# Patient Record
Sex: FEMALE | Race: WHITE | NOT HISPANIC OR LATINO | Employment: FULL TIME | URBAN - METROPOLITAN AREA
[De-identification: names, ages, dates, MRNs, and addresses within clinical notes are randomized per-mention and may not be internally consistent; named-entity substitution may affect disease eponyms.]

---

## 2017-03-13 ENCOUNTER — OFFICE VISIT (OUTPATIENT)
Dept: FAMILY MEDICINE CLINIC | Facility: CLINIC | Age: 62
End: 2017-03-13

## 2017-03-13 VITALS
TEMPERATURE: 98.2 F | HEART RATE: 70 BPM | SYSTOLIC BLOOD PRESSURE: 158 MMHG | HEIGHT: 68 IN | RESPIRATION RATE: 16 BRPM | WEIGHT: 178 LBS | OXYGEN SATURATION: 98 % | DIASTOLIC BLOOD PRESSURE: 90 MMHG | BODY MASS INDEX: 26.98 KG/M2

## 2017-03-13 DIAGNOSIS — I10 ESSENTIAL HYPERTENSION: ICD-10-CM

## 2017-03-13 DIAGNOSIS — J43.9 PULMONARY EMPHYSEMA, UNSPECIFIED EMPHYSEMA TYPE (HCC): Primary | ICD-10-CM

## 2017-03-13 PROCEDURE — 99203 OFFICE O/P NEW LOW 30 MIN: CPT | Performed by: INTERNAL MEDICINE

## 2017-03-13 RX ORDER — CETIRIZINE HYDROCHLORIDE 10 MG/1
10 TABLET ORAL DAILY PRN
COMMUNITY
Start: 2016-08-07 | End: 2019-01-23

## 2017-03-13 RX ORDER — TRIAMTERENE AND HYDROCHLOROTHIAZIDE 75; 50 MG/1; MG/1
1 TABLET ORAL DAILY
Qty: 30 TABLET | Refills: 11 | Status: SHIPPED | OUTPATIENT
Start: 2017-03-13 | End: 2017-11-30

## 2017-03-13 RX ORDER — AMLODIPINE BESYLATE 2.5 MG/1
2.5 TABLET ORAL DAILY
COMMUNITY
End: 2017-03-15 | Stop reason: SDUPTHER

## 2017-03-13 RX ORDER — OMEPRAZOLE 40 MG/1
CAPSULE, DELAYED RELEASE ORAL
COMMUNITY
Start: 2017-02-23 | End: 2017-03-15 | Stop reason: SDUPTHER

## 2017-03-13 RX ORDER — METOPROLOL SUCCINATE 25 MG/1
TABLET, EXTENDED RELEASE ORAL
COMMUNITY
Start: 2017-02-23 | End: 2017-03-15 | Stop reason: SDUPTHER

## 2017-03-13 NOTE — PROGRESS NOTES
Subjective   Adele Ly is a 61 y.o. female. Patient is here today for   Chief Complaint   Patient presents with   • Hypertension     has not been seen since 2013          Vitals:    03/13/17 1418   BP: 158/90   Pulse: 70   Resp: 16   Temp: 98.2 °F (36.8 °C)   SpO2: 98%       Past Medical History   Diagnosis Date   • Arthritis    • Colon polyp    • Emphysema of lung    • Hypertension    • Infectious viral hepatitis       Allergies   Allergen Reactions   • Penicillins       Social History     Social History   • Marital status:      Spouse name: N/A   • Number of children: N/A   • Years of education: N/A     Occupational History   • Not on file.     Social History Main Topics   • Smoking status: Current Every Day Smoker   • Smokeless tobacco: Not on file   • Alcohol use Yes   • Drug use: Not on file   • Sexual activity: Not on file     Other Topics Concern   • Not on file     Social History Narrative   • No narrative on file        Current Outpatient Prescriptions:   •  acyclovir (ZOVIRAX) 400 MG tablet, Take 1 tablet by mouth Daily., Disp: 30 tablet, Rfl: 4  •  amLODIPine (NORVASC) 2.5 MG tablet, Take 2.5 mg by mouth Daily., Disp: , Rfl:   •  Apremilast (OTEZLA PO), Take  by mouth., Disp: , Rfl:   •  betamethasone valerate (VALISONE) 0.1 % cream, Apply  topically 3 (Three) Times a Day., Disp: , Rfl:   •  cetirizine (ZYRTEC ALLERGY) 10 MG tablet, Take 10 mg by mouth., Disp: , Rfl:   •  estradiol (ESTRACE) 0.5 MG tablet, Take 1 tablet by mouth Daily., Disp: 30 tablet, Rfl: 4  •  medroxyPROGESTERone (PROVERA) 2.5 MG tablet, Take 1 tablet by mouth Daily., Disp: 30 tablet, Rfl: 4  •  metoprolol succinate XL (TOPROL-XL) 25 MG 24 hr tablet, , Disp: , Rfl:   •  omeprazole (priLOSEC) 40 MG capsule, , Disp: , Rfl:   •  amLODIPine (NORVASC) 10 MG tablet, Take  by mouth Daily., Disp: , Rfl:   •  triamterene-hydrochlorothiazide (MAXZIDE) 75-50 MG per tablet, Take 1 tablet by mouth Daily., Disp: 30 tablet, Rfl: 11      Objective     HPI Comments: This patient is being followed by her dermatologist for psoriasis.    She is to be a patient of mine, she is returned back to my office today because her insurance allows it.  She and I had known each other bit more than 3 years ago and we didn't accept her insurance any longer, or she had to do without insurance.  In any case, she has insurance which we except that I'm glad to see her back.    She has been treated for hypertension with amlodipine, and triamterene or thiazide.    She sees a gynecologist with whom she follows up for Provera, Estrace etc.    Her dermatologist prescribes Otezla     Hypertension          Review of Systems   Constitutional: Negative.    HENT: Negative.    Respiratory: Negative.    Cardiovascular: Negative.    Genitourinary: Negative.    Musculoskeletal: Negative.    Psychiatric/Behavioral: Negative.        Physical Exam   Constitutional: She is oriented to person, place, and time. She appears well-developed and well-nourished.   HENT:   Head: Normocephalic and atraumatic.   Cardiovascular: Normal rate.    Pulmonary/Chest: Effort normal.   Neurological: She is alert and oriented to person, place, and time.   Psychiatric: She has a normal mood and affect. Her behavior is normal. Thought content normal.   Nursing note and vitals reviewed.        Problem List Items Addressed This Visit        Cardiovascular and Mediastinum    BP (high blood pressure)    Relevant Medications    metoprolol succinate XL (TOPROL-XL) 25 MG 24 hr tablet    amLODIPine (NORVASC) 2.5 MG tablet    triamterene-hydrochlorothiazide (MAXZIDE) 75-50 MG per tablet       Respiratory    Pulmonary emphysema - Primary    Relevant Medications    cetirizine (ZYRTEC ALLERGY) 10 MG tablet            PLAN  Her hypertension is not so well-controlled today.  She has a blood pressure cuff at home.  I asked her to check her blood pressure cuff regularly (check her blood pressure regularly).    I asked her  to follow-up in a month or 2 to bring her blood pressure cuff with her so I can take a look at her blood pressure.    She is emphysema.  She requested a refill of Advair Diskus.  I gave her sample of this today.    Follow-up with me in approximately 6 months for a comprehensive physical exam.  No Follow-up on file.

## 2017-03-15 ENCOUNTER — TELEPHONE (OUTPATIENT)
Dept: FAMILY MEDICINE CLINIC | Facility: CLINIC | Age: 62
End: 2017-03-15

## 2017-03-15 RX ORDER — METOPROLOL SUCCINATE 25 MG/1
25 TABLET, EXTENDED RELEASE ORAL DAILY
Qty: 90 TABLET | Refills: 1 | Status: SHIPPED | OUTPATIENT
Start: 2017-03-15 | End: 2017-09-23 | Stop reason: SDUPTHER

## 2017-03-15 RX ORDER — AMLODIPINE BESYLATE 2.5 MG/1
2.5 TABLET ORAL DAILY
Qty: 90 TABLET | Refills: 1 | Status: SHIPPED | OUTPATIENT
Start: 2017-03-15 | End: 2017-11-30 | Stop reason: DRUGHIGH

## 2017-03-15 RX ORDER — OMEPRAZOLE 40 MG/1
40 CAPSULE, DELAYED RELEASE ORAL DAILY
Qty: 90 CAPSULE | Refills: 1 | Status: SHIPPED | OUTPATIENT
Start: 2017-03-15 | End: 2017-10-27 | Stop reason: SDUPTHER

## 2017-03-15 NOTE — TELEPHONE ENCOUNTER
Sent to pharmacy   ----- Message from Marcia Nuno sent at 3/15/2017  9:13 AM EDT -----  PT SAYS THE SCRIPT THAT WAS CALLED THE AMITRIPTYLINE SHE HASN'T TAKEN IN OVER 5 YEARS.  SHE NEEDS AMLODIPINE, METOPROLOL, OMEPRAZOLE.    PHARMACY: [phone]    PLEASE CALL PT WHEN DONE OR WITH QUESTIONS THANK YOU

## 2017-04-10 ENCOUNTER — TELEPHONE (OUTPATIENT)
Dept: FAMILY MEDICINE CLINIC | Facility: CLINIC | Age: 62
End: 2017-04-10

## 2017-04-10 RX ORDER — AMLODIPINE BESYLATE 10 MG/1
10 TABLET ORAL DAILY
Qty: 90 TABLET | Refills: 1 | Status: SHIPPED | OUTPATIENT
Start: 2017-04-10 | End: 2017-10-05 | Stop reason: SDUPTHER

## 2017-04-10 RX ORDER — ESTRADIOL 0.5 MG/1
TABLET ORAL
Qty: 30 TABLET | Refills: 3 | Status: SHIPPED | OUTPATIENT
Start: 2017-04-10 | End: 2017-08-09 | Stop reason: SDUPTHER

## 2017-04-10 RX ORDER — MEDROXYPROGESTERONE ACETATE 2.5 MG/1
TABLET ORAL
Qty: 30 TABLET | Refills: 3 | Status: SHIPPED | OUTPATIENT
Start: 2017-04-10 | End: 2017-08-09 | Stop reason: SDUPTHER

## 2017-04-10 NOTE — TELEPHONE ENCOUNTER
Prescription sent to pharmacy   ----- Message from Jaimee Bird MA sent at 4/10/2017  9:42 AM EDT -----  Contact: PATIENT  PT SAID THAT RECENTLY HER AMLODIPINE WAS CALLED INTO [facility] BUT IT WAS SENT IN FOR AMLODIPINE 2.5MG AND PT STATES SHE IS TAKING 10MG. PT WANTS TO KNOW IF YOU CAN SEND AN RX FOR CORRECT DOSAGE. PLEASE CALL PT WITH ANY QUESTIONS. [phone]. THANK YOU.

## 2017-07-24 RX ORDER — ACYCLOVIR 400 MG/1
TABLET ORAL
Qty: 30 TABLET | Refills: 3 | OUTPATIENT
Start: 2017-07-24

## 2017-08-09 ENCOUNTER — TELEPHONE (OUTPATIENT)
Dept: OBSTETRICS AND GYNECOLOGY | Age: 62
End: 2017-08-09

## 2017-08-09 RX ORDER — ESTRADIOL 0.5 MG/1
0.5 TABLET ORAL DAILY
Qty: 30 TABLET | Refills: 2 | Status: SHIPPED | OUTPATIENT
Start: 2017-08-09 | End: 2017-11-27

## 2017-08-09 RX ORDER — MEDROXYPROGESTERONE ACETATE 2.5 MG/1
2.5 TABLET ORAL DAILY
Qty: 30 TABLET | Refills: 2 | Status: SHIPPED | OUTPATIENT
Start: 2017-08-09 | End: 2017-11-27

## 2017-08-09 RX ORDER — ESTRADIOL 0.5 MG/1
TABLET ORAL
Qty: 30 TABLET | Refills: 2 | Status: SHIPPED | OUTPATIENT
Start: 2017-08-09 | End: 2017-11-03 | Stop reason: SDUPTHER

## 2017-08-09 RX ORDER — MEDROXYPROGESTERONE ACETATE 2.5 MG/1
TABLET ORAL
Qty: 30 TABLET | Refills: 2 | Status: SHIPPED | OUTPATIENT
Start: 2017-08-09 | End: 2017-11-03 | Stop reason: SDUPTHER

## 2017-08-09 RX ORDER — ACYCLOVIR 400 MG/1
400 TABLET ORAL DAILY
Qty: 30 TABLET | Refills: 2 | Status: SHIPPED | OUTPATIENT
Start: 2017-08-09 | End: 2017-10-16 | Stop reason: SDUPTHER

## 2017-08-09 NOTE — TELEPHONE ENCOUNTER
Dr REYNOLDS pt, has AE sched for 10/18/17. Needing refills sent to pharm on file for:  Provera 2.5 mg PO daily  Estradiol 0.5 mg PO daily  Acyclovir 400 mg PO daily    Pt # [phone]

## 2017-09-25 RX ORDER — METOPROLOL SUCCINATE 25 MG/1
TABLET, EXTENDED RELEASE ORAL
Qty: 30 TABLET | Refills: 0 | Status: SHIPPED | OUTPATIENT
Start: 2017-09-25 | End: 2017-10-23 | Stop reason: SDUPTHER

## 2017-10-06 RX ORDER — AMLODIPINE BESYLATE 10 MG/1
TABLET ORAL
Qty: 30 TABLET | Refills: 0 | Status: SHIPPED | OUTPATIENT
Start: 2017-10-06 | End: 2017-11-10 | Stop reason: SDUPTHER

## 2017-10-16 ENCOUNTER — OFFICE VISIT (OUTPATIENT)
Dept: OBSTETRICS AND GYNECOLOGY | Age: 62
End: 2017-10-16

## 2017-10-16 VITALS
WEIGHT: 170 LBS | SYSTOLIC BLOOD PRESSURE: 180 MMHG | BODY MASS INDEX: 26.68 KG/M2 | HEIGHT: 67 IN | DIASTOLIC BLOOD PRESSURE: 98 MMHG

## 2017-10-16 DIAGNOSIS — Z01.419 ENCOUNTER FOR GYNECOLOGICAL EXAMINATION WITHOUT ABNORMAL FINDING: Primary | ICD-10-CM

## 2017-10-16 DIAGNOSIS — Z12.4 SCREENING FOR CERVICAL CANCER: ICD-10-CM

## 2017-10-16 PROCEDURE — 99396 PREV VISIT EST AGE 40-64: CPT | Performed by: OBSTETRICS & GYNECOLOGY

## 2017-10-16 RX ORDER — ACYCLOVIR 400 MG/1
400 TABLET ORAL DAILY
Qty: 30 TABLET | Refills: 4 | Status: SHIPPED | OUTPATIENT
Start: 2017-10-16 | End: 2018-05-26 | Stop reason: SDUPTHER

## 2017-10-16 NOTE — PROGRESS NOTES
Routine Annual Visit    10/16/2017    Patient: Adele Ly          MR#:[id number]      Chief Complaint   Patient presents with   • Annual Exam     PT HERE FOR ROUTINE AE, NOT SURE ON HER LAST MG THINKS IT WAS 2016 BUT NEVER WENT BACK FOR 3D IMAGING DUE TO COPAY COST. SHE IS OTHERWISE OK. LAST PAP 2012 (ASCUS BUT NEG HPV).       History of Present Illness    61 y.o. female No obstetric history on file. who presents for annual exam.   Pt never followed up on mammo from 1/2016- no one could tell her the copay  She has no breast or GYN concerns  She is taking HRT and wants a refill - she gets HF , NS and mood swings off them  She is a 1 ppd smoker  I discussed risk of stroke and increased risk of breast cancer with HRT- I rec trial off and encouraged her to try it this winter  Also discussed antidepressants as an option of menopausal symptoms  Had CSC last year and polyps found and they wanted her back in 1 year  She says she doesn't plan to repeat CSC anytime soon because she has   Too many other concerns including a cyst on her eye and no one can tell her her copay for this as well  Due for pap          No LMP recorded. Patient is postmenopausal.  Obstetric History:  OB History     No data available         Menstrual History:     No LMP recorded. Patient is postmenopausal.       Sexual History:       ________________________________________  Patient Active Problem List   Diagnosis   • BP (high blood pressure)   • Pulmonary emphysema       Past Medical History:   Diagnosis Date   • Arthritis    • Colon polyp    • Emphysema of lung    • Hypertension    • Infectious viral hepatitis        No past surgical history on file.    History   Smoking Status   • Current Every Day Smoker   Smokeless Tobacco   • Not on file       has a current medication list which includes the following prescription(s): acyclovir, amlodipine, amlodipine, apremilast, betamethasone valerate, cetirizine, estradiol, estradiol, medroxyprogesterone,  "medroxyprogesterone, metoprolol succinate xl, omeprazole, and triamterene-hydrochlorothiazide.  ________________________________________    Current contraception: post menopausal status  History of abnormal Pap smear: no  Family history of Breast cancer: no  Family history of uterine or ovarian cancer: no  Family History of colon cancer/colon polyps: yes - pt with polyps  History of abnormal mammogram: yes - got call back last mammogram and declined to follow up      The following portions of the patient's history were reviewed and updated as appropriate: allergies, current medications, past family history, past medical history, past social history, past surgical history and problem list.    Review of Systems    Pertinent items are noted in HPI.     Objective   Physical Exam    /98  Ht 67\" (170.2 cm)  Wt 170 lb (77.1 kg)  BMI 26.63 kg/m2   BP Readings from Last 3 Encounters:   10/16/17 180/98   03/13/17 158/90   09/26/13 122/74      Wt Readings from Last 3 Encounters:   10/16/17 170 lb (77.1 kg)   03/13/17 178 lb (80.7 kg)   09/26/13 167 lb (75.8 kg)      BMI: Estimated body mass index is 26.63 kg/(m^2) as calculated from the following:    Height as of this encounter: 67\" (170.2 cm).    Weight as of this encounter: 170 lb (77.1 kg).      General:   alert, appears stated age and cooperative   Abdomen: soft, non-tender, without masses or organomegaly   Breast: inspection negative, no nipple discharge or bleeding, no masses or nodularity palpable   Vulva: normal   Vagina: normal mucosa   Cervix: no cervical motion tenderness and no lesions   Uterus: normal size, mobile or non-tender   Adnexa: normal adnexa and no mass, fullness, tenderness     Assessment:    1. Normal annual exam   Assessment     ICD-10-CM ICD-9-CM   1. Encounter for gynecological examination without abnormal finding Z01.419 V72.31   2. Screening for cervical cancer Z12.4 V76.2     Plan:    Plan     [x]  Mammogram request made  [x]  PAP " done  []  Labs:   []  GC/Chl/TV  []  DEXA scan   []  Referral for colonoscopy:       Adele was seen today for annual exam.    Diagnoses and all orders for this visit:    Encounter for gynecological examination without abnormal finding  -     IGP, Aptima HPV, Rfx 16 / 18,45 - ThinPrep Vial, Cervix    Screening for cervical cancer  -     IGP, Aptima HPV, Rfx 16 / 18,45 - ThinPrep Vial, Cervix      Will do screening mammo today for stability from last mammo  rec off HRT for breast cancer and stroke risk concerns as she is smoker  Encouraged fu CSC  Encouraged smoking cessation    Counseling:  --Nutrition: Stressed importance of moderation and caloric balance, stressed fresh fruit and vegetables  --Exercise: Stressed the importance of regular exercise. 3-5 times weekly   - Discussed screening mammogram recommendations.   --Discussed benefits of screening colonoscopy- age 50 unless FH  --Discussed pap smear screening recommendations

## 2017-10-18 LAB
CYTOLOGIST CVX/VAG CYTO: NORMAL
CYTOLOGY CVX/VAG DOC THIN PREP: NORMAL
DX ICD CODE: NORMAL
HIV 1 & 2 AB SER-IMP: NORMAL
HPV I/H RISK 4 DNA CVX QL PROBE+SIG AMP: NEGATIVE
OTHER STN SPEC: NORMAL
PATH REPORT.FINAL DX SPEC: NORMAL
STAT OF ADQ CVX/VAG CYTO-IMP: NORMAL

## 2017-10-19 ENCOUNTER — TELEPHONE (OUTPATIENT)
Dept: OBSTETRICS AND GYNECOLOGY | Age: 62
End: 2017-10-19

## 2017-10-23 RX ORDER — METOPROLOL SUCCINATE 25 MG/1
TABLET, EXTENDED RELEASE ORAL
Qty: 30 TABLET | Refills: 0 | Status: SHIPPED | OUTPATIENT
Start: 2017-10-23 | End: 2017-11-26 | Stop reason: SDUPTHER

## 2017-10-27 RX ORDER — OMEPRAZOLE 40 MG/1
CAPSULE, DELAYED RELEASE ORAL
Qty: 30 CAPSULE | Refills: 0 | Status: SHIPPED | OUTPATIENT
Start: 2017-10-27 | End: 2017-11-26 | Stop reason: SDUPTHER

## 2017-10-31 ENCOUNTER — TELEPHONE (OUTPATIENT)
Dept: OBSTETRICS AND GYNECOLOGY | Age: 62
End: 2017-10-31

## 2017-10-31 ENCOUNTER — APPOINTMENT (OUTPATIENT)
Dept: MAMMOGRAPHY | Facility: HOSPITAL | Age: 62
End: 2017-10-31
Attending: OBSTETRICS & GYNECOLOGY

## 2017-10-31 ENCOUNTER — HOSPITAL ENCOUNTER (OUTPATIENT)
Dept: ULTRASOUND IMAGING | Facility: HOSPITAL | Age: 62
End: 2017-10-31
Attending: OBSTETRICS & GYNECOLOGY

## 2017-11-02 ENCOUNTER — APPOINTMENT (OUTPATIENT)
Dept: ULTRASOUND IMAGING | Facility: HOSPITAL | Age: 62
End: 2017-11-02
Attending: OBSTETRICS & GYNECOLOGY

## 2017-11-09 ENCOUNTER — APPOINTMENT (OUTPATIENT)
Dept: ULTRASOUND IMAGING | Facility: HOSPITAL | Age: 62
End: 2017-11-09
Attending: OBSTETRICS & GYNECOLOGY

## 2017-11-09 ENCOUNTER — HOSPITAL ENCOUNTER (OUTPATIENT)
Dept: MAMMOGRAPHY | Facility: HOSPITAL | Age: 62
End: 2017-11-09
Attending: OBSTETRICS & GYNECOLOGY

## 2017-11-10 RX ORDER — AMLODIPINE BESYLATE 10 MG/1
TABLET ORAL
Qty: 30 TABLET | Refills: 0 | Status: SHIPPED | OUTPATIENT
Start: 2017-11-10 | End: 2017-12-07 | Stop reason: SDUPTHER

## 2017-11-17 ENCOUNTER — HOSPITAL ENCOUNTER (OUTPATIENT)
Dept: ULTRASOUND IMAGING | Facility: HOSPITAL | Age: 62
End: 2017-11-17
Attending: OBSTETRICS & GYNECOLOGY

## 2017-11-17 ENCOUNTER — HOSPITAL ENCOUNTER (OUTPATIENT)
Dept: MAMMOGRAPHY | Facility: HOSPITAL | Age: 62
Discharge: HOME | End: 2017-11-17
Attending: OBSTETRICS & GYNECOLOGY | Admitting: OBSTETRICS & GYNECOLOGY

## 2017-11-17 ENCOUNTER — HOSPITAL ENCOUNTER (OUTPATIENT)
Dept: MAMMOGRAPHY | Facility: HOSPITAL | Age: 62
Discharge: HOME | End: 2017-11-17
Attending: OBSTETRICS & GYNECOLOGY

## 2017-11-17 VITALS
BODY MASS INDEX: 25.01 KG/M2 | RESPIRATION RATE: 20 BRPM | HEIGHT: 68 IN | TEMPERATURE: 96.7 F | WEIGHT: 165 LBS | HEART RATE: 80 BPM | DIASTOLIC BLOOD PRESSURE: 108 MMHG | OXYGEN SATURATION: 99 % | SYSTOLIC BLOOD PRESSURE: 175 MMHG

## 2017-11-17 DIAGNOSIS — N64.89 BREAST ASYMMETRY: ICD-10-CM

## 2017-11-17 DIAGNOSIS — R92.1 BREAST CALCIFICATION, RIGHT: ICD-10-CM

## 2017-11-17 DIAGNOSIS — R92.1 BREAST CALCIFICATION SEEN ON MAMMOGRAM: ICD-10-CM

## 2017-11-17 PROCEDURE — 88305 TISSUE EXAM BY PATHOLOGIST: CPT | Performed by: OBSTETRICS & GYNECOLOGY

## 2017-11-17 PROCEDURE — G0206 DX MAMMO INCL CAD UNI: HCPCS

## 2017-11-17 RX ORDER — LIDOCAINE HYDROCHLORIDE 10 MG/ML
1 INJECTION, SOLUTION INFILTRATION; PERINEURAL ONCE
Status: COMPLETED | OUTPATIENT
Start: 2017-11-17 | End: 2017-11-17

## 2017-11-17 RX ORDER — LIDOCAINE HYDROCHLORIDE AND EPINEPHRINE 10; 10 MG/ML; UG/ML
20 INJECTION, SOLUTION INFILTRATION; PERINEURAL ONCE
Status: COMPLETED | OUTPATIENT
Start: 2017-11-17 | End: 2017-11-17

## 2017-11-17 RX ADMIN — LIDOCAINE HYDROCHLORIDE 1 ML: 10 INJECTION, SOLUTION INFILTRATION; PERINEURAL at 13:05

## 2017-11-17 RX ADMIN — LIDOCAINE HYDROCHLORIDE AND EPINEPHRINE 20 ML: 10; 10 INJECTION, SOLUTION INFILTRATION; PERINEURAL at 13:05

## 2017-11-17 RX ADMIN — LIDOCAINE HYDROCHLORIDE AND EPINEPHRINE 20 ML: 10; 10 INJECTION, SOLUTION INFILTRATION; PERINEURAL at 13:40

## 2017-11-17 RX ADMIN — LIDOCAINE HYDROCHLORIDE 1 ML: 10 INJECTION, SOLUTION INFILTRATION; PERINEURAL at 13:40

## 2017-11-17 NOTE — NURSING NOTE
Biopsy sites x2 to right lateral breast. Dermabond to each site dry and intact. Lime sized area of light bruising present around sites. No firmness or swelling noted at or around either biopsy site. Denies pain. Ice pack with protective covering applied to biopsy sites. Wide Ace pressure wrap applied in lieu of a bra as her bra is underwire and the wires sit very close of biopsy sites. Stressed the importance of wearing the wrap or a soft bra 24/7 for 4 days. Also stressed the importance of applying cold packs as directed. Discharge instructions discussed with understanding voiced by patient. Copies provided to patient. No distress noted. To home via private vehicle.

## 2017-11-18 ENCOUNTER — TELEPHONE (OUTPATIENT)
Dept: INTERVENTIONAL RADIOLOGY/VASCULAR | Facility: HOSPITAL | Age: 62
End: 2017-11-18

## 2017-11-20 ENCOUNTER — TELEPHONE (OUTPATIENT)
Dept: OBSTETRICS AND GYNECOLOGY | Age: 62
End: 2017-11-20

## 2017-11-20 DIAGNOSIS — C50.911 DUCTAL CARCINOMA OF RIGHT BREAST (HCC): Primary | ICD-10-CM

## 2017-11-24 LAB
CYTO UR: NORMAL
LAB AP CASE REPORT: NORMAL
LAB AP CLINICAL INFORMATION: NORMAL
Lab: NORMAL
PATH REPORT.ADDENDUM SPEC: NORMAL
PATH REPORT.FINAL DX SPEC: NORMAL
PATH REPORT.GROSS SPEC: NORMAL

## 2017-11-27 ENCOUNTER — TELEPHONE (OUTPATIENT)
Dept: OBSTETRICS AND GYNECOLOGY | Age: 62
End: 2017-11-27

## 2017-11-27 RX ORDER — METOPROLOL SUCCINATE 25 MG/1
TABLET, EXTENDED RELEASE ORAL
Qty: 30 TABLET | Refills: 0 | Status: SHIPPED | OUTPATIENT
Start: 2017-11-27 | End: 2017-12-26 | Stop reason: SDUPTHER

## 2017-11-27 RX ORDER — OMEPRAZOLE 40 MG/1
CAPSULE, DELAYED RELEASE ORAL
Qty: 30 CAPSULE | Refills: 0 | Status: SHIPPED | OUTPATIENT
Start: 2017-11-27 | End: 2017-12-26 | Stop reason: SDUPTHER

## 2017-11-27 NOTE — TELEPHONE ENCOUNTER
Left message about hormone receptors in path report and to confirm she needs to stop all HRT immediately

## 2017-11-27 NOTE — TELEPHONE ENCOUNTER
Spoke with pt  She confirmed off HRT these past 10 days  Aware of appt Thursday and plans to be there

## 2017-11-30 ENCOUNTER — OFFICE VISIT (OUTPATIENT)
Dept: MAMMOGRAPHY | Facility: CLINIC | Age: 62
End: 2017-11-30

## 2017-11-30 ENCOUNTER — PREP FOR SURGERY (OUTPATIENT)
Dept: OTHER | Facility: HOSPITAL | Age: 62
End: 2017-11-30

## 2017-11-30 VITALS
WEIGHT: 170 LBS | HEIGHT: 68 IN | HEART RATE: 81 BPM | TEMPERATURE: 98.2 F | DIASTOLIC BLOOD PRESSURE: 95 MMHG | SYSTOLIC BLOOD PRESSURE: 155 MMHG | OXYGEN SATURATION: 99 % | BODY MASS INDEX: 25.76 KG/M2

## 2017-11-30 DIAGNOSIS — Z17.0 MALIGNANT NEOPLASM OF OVERLAPPING SITES OF RIGHT BREAST IN FEMALE, ESTROGEN RECEPTOR POSITIVE (HCC): Primary | ICD-10-CM

## 2017-11-30 DIAGNOSIS — C50.811 MALIGNANT NEOPLASM OF OVERLAPPING SITES OF RIGHT BREAST IN FEMALE, ESTROGEN RECEPTOR POSITIVE (HCC): Primary | ICD-10-CM

## 2017-11-30 PROCEDURE — 99205 OFFICE O/P NEW HI 60 MIN: CPT | Performed by: SURGERY

## 2017-11-30 RX ORDER — ACETAMINOPHEN 500 MG
1000 TABLET ORAL ONCE
Status: CANCELLED | OUTPATIENT
Start: 2018-01-10 | End: 2017-11-30

## 2017-11-30 RX ORDER — CELECOXIB 200 MG/1
400 CAPSULE ORAL ONCE
Status: CANCELLED | OUTPATIENT
Start: 2018-01-10 | End: 2017-11-30

## 2017-11-30 RX ORDER — CLINDAMYCIN PHOSPHATE 900 MG/50ML
900 INJECTION INTRAVENOUS ONCE
Status: CANCELLED | OUTPATIENT
Start: 2018-01-10 | End: 2017-11-30

## 2017-11-30 RX ORDER — IBUPROFEN 200 MG
200 TABLET ORAL EVERY 6 HOURS PRN
COMMUNITY
End: 2020-07-12

## 2017-11-30 RX ORDER — LIDOCAINE AND PRILOCAINE 25; 25 MG/G; MG/G
CREAM TOPICAL ONCE
Status: CANCELLED | OUTPATIENT
Start: 2018-01-10 | End: 2017-11-30

## 2017-11-30 NOTE — PROGRESS NOTES
Chief Complaint: Adele Ly is a 62 y.o.. female here today for Breast Cancer (right breast)        History of Present Illness:  Patient presents with newly diagnosed breast cancer.  The patient apparently had mammograms last year which described a large area of calcifications in the upper outer quadrant of the right breast measuring 12 x 6 cm.  There was also a separate area of calcifications about 4 cm away.  The patient did not pursue evaluation of this and returned for imaging studies again this year.  In the posterior one third of the upper outer quadrant of the right breast, there was an area of focal asymmetry in association with the calcifications.  There were also a number of calcifications in the posterior one third of the right breast at 9:00.  The patient underwent diagnostic mammography and was noted to have 2 separate clusters of suspicious calcifications.  These appeared to be  by about 50 mm.  A biopsy was obtained at the 10:30 position as well as the 9 o'clock position.  At the 10:30 position, invasive ductal cancer was discovered.  This tumor was grade 2 and was associated with some high-grade DCIS.  This invasive cancer was ER positive at greater than 90%, OH positive at 90%, and HER-2 positive at 3+.  She also had biopsy of an area at the 9 o'clock position which revealed high-grade DCIS without definite invasion.  Prior to these studies, the patient had not detected any palpable masses and there was no evidence of nipple discharge.  Her family history is significant for a maternal grandmother with breast cancer as well as a maternal aunt with breast cancer.  There was also a paternal aunt with ovarian cancer and a paternal uncle with colon cancer.  The patient has been taking hormone replacement therapy up until about 2 weeks ago.  She is a smoker and does not have any children.  I have personally reviewed her imaging studies and agree with the findings.      Review of  Systems:  Review of Systems   Skin:        Right breast with bruising and tenderness, but no other changes to the skin of the breast   All other systems reviewed and are negative.       Past Medical and Surgical History:  Breast Biopsy History:  Patient has had the following breast biopsies:right breast 2017 malignant  Breast Cancer HIstory:  Patient does not have a past medical history of breast cancer.  Breast Operations, and year:  None    History   Smoking Status   • Current Every Day Smoker   • Packs/day: 1.00   • Types: Cigarettes   • Start date: 1975   Smokeless Tobacco   • Not on file     Obstetric History:  Patient is postmenopausal, entered menopause naturally at age: 52   Number of pregnancies:0  Number of live births: 0  Number of abortions or miscarriages: 0  Length of time taking birth control pills:6 years  Patient took hormone replacement during the following dates:Estradiol for 10 years.     Past Surgical History:   Procedure Laterality Date   • BREAST BIOPSY Right 2017    malignant   • COLON RESECTION     • COLOSTOMY     • COLOSTOMY REVISION     • PELVIC LAPAROSCOPY     • TONSILLECTOMY     • WISDOM TOOTH EXTRACTION         Past Medical History:   Diagnosis Date   • Arthritis    • Colon polyp    • Emphysema of lung    • Hypertension    • Infectious viral hepatitis        Prior Hospitalizations, other than for surgery or childbirth, and year:  none    Social History:  Patient is single.  Patient has no children.    Family History:  Family History   Problem Relation Age of Onset   • Diabetes Father    • Hypertension Father    • No Known Problems Mother    • No Known Problems Sister    • No Known Problems Brother    • No Known Problems Daughter    • No Known Problems Son    • Breast cancer Maternal Grandmother 60   • No Known Problems Paternal Grandmother    • Breast cancer Maternal Aunt 60   • Diabetes Paternal Aunt    • Ovarian cancer Paternal Aunt 41   • Colon cancer Maternal Uncle    • COPD  Paternal Uncle    • BRCA 1/2 Neg Hx    • Endometrial cancer Neg Hx        Vital Signs:  Vitals:    11/30/17 1337   BP: 155/95   Pulse: 81   Temp: 98.2 °F (36.8 °C)   SpO2: 99%       Medications:    Current Outpatient Prescriptions:     Current Outpatient Prescriptions:   •  acyclovir (ZOVIRAX) 400 MG tablet, Take 1 tablet by mouth Daily., Disp: 30 tablet, Rfl: 4  •  amLODIPine (NORVASC) 10 MG tablet, TAKE ONE TABLET BY MOUTH DAILY, Disp: 30 tablet, Rfl: 0  •  Apremilast (OTEZLA PO), Take  by mouth., Disp: , Rfl:   •  cetirizine (ZYRTEC ALLERGY) 10 MG tablet, Take 10 mg by mouth., Disp: , Rfl:   •  ibuprofen (ADVIL,MOTRIN) 200 MG tablet, Take 200 mg by mouth Every 6 (Six) Hours As Needed., Disp: , Rfl:   •  metoprolol succinate XL (TOPROL-XL) 25 MG 24 hr tablet, TAKE ONE TABLET BY MOUTH DAILY, Disp: 30 tablet, Rfl: 0  •  omeprazole (priLOSEC) 40 MG capsule, TAKE ONE CAPSULE BY MOUTH DAILY, Disp: 30 capsule, Rfl: 0  •  TURMERIC PO, Take  by mouth., Disp: , Rfl:     Physical Examination:  General Appearance:   Patient is in no distress.  She is well kept and has an average build.   Psychiatric:  Patient with appropriate mood and affect. Alert and oriented to self, time, and place.    Breast, RIGHT:  medium sized, symmetric with the contralateral side.  Breast skin is without erythema, edema, rashes.  There are no visible abnormalities upon inspection during the arm-raising maneuver or with hands on hips in the sitting position. There is no nipple retraction, discharge or nipple/areolar skin changes.There are too small biopsy scars in the lateral aspect of the breast from her recent biopsies.  There is significant bruising around the more inferior one as well as tenderness.  There is some firmness associated with his bruising but I think it is all related to a hematoma.  The breast tissue does feel fairly dense.    Breast, LEFT:  medium sized, symmetric with the contralateral side.  Breast skin is without erythema,  edema, rashes.  There are no visible abnormalities upon inspection during the arm-raising maneuver or with hands on hips in the sitting position. There is no nipple retraction, discharge or nipple/areolar skin changes.There are no masses palpable in the sitting or supine positions.  The patient does have fairly dense breast tissue.    Lymphatic:  There is no axillary, cervical, infraclavicular, or supraclavicular adenopathy bilaterally.  Eyes:  Pupils are round and reactive to light.  Cardiovascular:  Heart rate and rhythm are regular.  Respiratory:  Lungs are clear bilaterally with no crackles or wheezes in any lung field.  Gastrointestinal:  Abdomen is soft, nondistended, and nontender.  There was no evidence of hepatosplenomegaly or abdominal mass.  There are  scars from previous colon surgery.    Musculoskeletal:  Good strength in all 4 extremities.   There is good range of motion in both shoulders.    Skin:  No new skin lesions or rashes on the skin excluding the breast (see breast exam above).    Assessment:  1. Malignant neoplasm of overlapping sites of right breast in female, estrogen receptor positive          Plan:  She was accompanied today by her sister.  The office visit lasted an hour and 10 minutes with 55 minutes spent in face-to-face consultation.    We began the conversation discussing her pathology report.  We talked about the origin of most of breast cancers from either the ducts or the lobules.  The difference between invasive and in situ disease was explained and the visual was drawn for her.  When invasion has occurred, the lymph nodes need to be evaluated.  When there is in situ disease the lymph nodes should be considered if the area of concern is widespread or it is high-grade.  We also discussed the significance of hormone receptors.  They often can give us some idea how the breast cancer will behave and potentially lead us to offer neoadjuvant chemotherapy.    Next we discussed the  surgical options which include breast conserving therapy versus a mastectomy.  With breast conserving therapy we are talking about a lumpectomy with margins, lymph node evaluation, and radiation treatment.  The radiation treatment is generally given to the entire breast for 6 weeks.  The side effects consist of local skin change and potential injury to nearby structures such as the lung or the heart.  A mastectomy would involve removing the breast tissues with preservation of the pectoral muscles and evaluation of the lymph nodes if indicated.  The potential for reconstruction by either an implant or autologous tissue was discussed.  This could be performed on a delayed basis or immediately depending on a multitude of factors.  The survival rates for these 2 procedures are equivalent but there are incidences where one may be favored over the other.  There are times when a lumpectomy is not possible due to the large tumor to breast ratio or the location of the tumor.  Previous radiation to the chest wall or collagen disorders such as scleroderma would make radiation treatment and possible and therefore exclude breast conserving therapy as an option.    As we began talking about her options, I explained to her the difficulty in performing a lumpectomy when having 2 separate quadrants with cancer.  I think her best option is a mastectomy.  Unfortunately her reconstructive options are not good because she is currently smoking.  I still think it would be a good idea for her to speak with the plastic surgeons and get an idea what is available to her regarding reconstruction.  I do not think that we would gain much with an MRI of the breasts.  CPT coding:    Next Appointment:  No Follow-up on file.            EMR Dragon/transcription disclaimer:    Much of this encounter note is an electronic transcription/translocation of spoken language to printed text.  The electronic translation of spoken language may permit erroneous,  or at times, nonsensical words or phrases to be inadvertently transcribed.  Although I have reviewed the note from such areas, some may still exist.

## 2017-12-01 DIAGNOSIS — C50.811 MALIGNANT NEOPLASM OF OVERLAPPING SITES OF RIGHT BREAST IN FEMALE, ESTROGEN RECEPTOR POSITIVE (HCC): Primary | ICD-10-CM

## 2017-12-01 DIAGNOSIS — Z17.0 MALIGNANT NEOPLASM OF OVERLAPPING SITES OF RIGHT BREAST IN FEMALE, ESTROGEN RECEPTOR POSITIVE (HCC): Primary | ICD-10-CM

## 2017-12-07 RX ORDER — AMLODIPINE BESYLATE 10 MG/1
TABLET ORAL
Qty: 30 TABLET | Refills: 0 | Status: SHIPPED | OUTPATIENT
Start: 2017-12-07 | End: 2018-01-04

## 2017-12-18 ENCOUNTER — TELEPHONE (OUTPATIENT)
Dept: MAMMOGRAPHY | Facility: CLINIC | Age: 62
End: 2017-12-18

## 2017-12-18 PROBLEM — Z17.0 MALIGNANT NEOPLASM OF OVERLAPPING SITES OF RIGHT BREAST IN FEMALE, ESTROGEN RECEPTOR POSITIVE (HCC): Status: ACTIVE | Noted: 2017-12-18

## 2017-12-18 PROBLEM — C50.811 MALIGNANT NEOPLASM OF OVERLAPPING SITES OF RIGHT BREAST IN FEMALE, ESTROGEN RECEPTOR POSITIVE: Status: ACTIVE | Noted: 2017-12-18

## 2017-12-26 RX ORDER — OMEPRAZOLE 40 MG/1
CAPSULE, DELAYED RELEASE ORAL
Qty: 30 CAPSULE | Refills: 0 | Status: SHIPPED | OUTPATIENT
Start: 2017-12-26 | End: 2018-01-04

## 2017-12-26 RX ORDER — METOPROLOL SUCCINATE 25 MG/1
TABLET, EXTENDED RELEASE ORAL
Qty: 30 TABLET | Refills: 0 | Status: SHIPPED | OUTPATIENT
Start: 2017-12-26 | End: 2018-01-04

## 2018-01-04 ENCOUNTER — APPOINTMENT (OUTPATIENT)
Dept: PREADMISSION TESTING | Facility: HOSPITAL | Age: 63
End: 2018-01-04

## 2018-01-04 ENCOUNTER — HOSPITAL ENCOUNTER (OUTPATIENT)
Dept: GENERAL RADIOLOGY | Facility: HOSPITAL | Age: 63
Discharge: HOME | End: 2018-01-04
Admitting: SURGERY

## 2018-01-04 VITALS
HEIGHT: 68 IN | RESPIRATION RATE: 20 BRPM | OXYGEN SATURATION: 99 % | HEART RATE: 82 BPM | SYSTOLIC BLOOD PRESSURE: 150 MMHG | WEIGHT: 174 LBS | TEMPERATURE: 97.2 F | BODY MASS INDEX: 26.37 KG/M2 | DIASTOLIC BLOOD PRESSURE: 95 MMHG

## 2018-01-04 DIAGNOSIS — Z17.0 MALIGNANT NEOPLASM OF OVERLAPPING SITES OF RIGHT BREAST IN FEMALE, ESTROGEN RECEPTOR POSITIVE (HCC): ICD-10-CM

## 2018-01-04 DIAGNOSIS — C50.811 MALIGNANT NEOPLASM OF OVERLAPPING SITES OF RIGHT BREAST IN FEMALE, ESTROGEN RECEPTOR POSITIVE (HCC): ICD-10-CM

## 2018-01-04 LAB
ALBUMIN SERPL-MCNC: 3.9 G/DL (ref 3.5–5.2)
ALBUMIN/GLOB SERPL: 1.1 G/DL
ALP SERPL-CCNC: 76 U/L (ref 39–117)
ALT SERPL W P-5'-P-CCNC: 7 U/L (ref 1–33)
ANION GAP SERPL CALCULATED.3IONS-SCNC: 13.8 MMOL/L
AST SERPL-CCNC: 13 U/L (ref 1–32)
BILIRUB SERPL-MCNC: <0.2 MG/DL (ref 0.1–1.2)
BUN BLD-MCNC: 17 MG/DL (ref 8–23)
BUN/CREAT SERPL: 19.3 (ref 7–25)
CALCIUM SPEC-SCNC: 9.5 MG/DL (ref 8.6–10.5)
CHLORIDE SERPL-SCNC: 101 MMOL/L (ref 98–107)
CO2 SERPL-SCNC: 25.2 MMOL/L (ref 22–29)
CREAT BLD-MCNC: 0.88 MG/DL (ref 0.57–1)
DEPRECATED RDW RBC AUTO: 45.4 FL (ref 37–54)
ERYTHROCYTE [DISTWIDTH] IN BLOOD BY AUTOMATED COUNT: 13.4 % (ref 11.7–13)
GFR SERPL CREATININE-BSD FRML MDRD: 66 ML/MIN/1.73
GLOBULIN UR ELPH-MCNC: 3.4 GM/DL
GLUCOSE BLD-MCNC: 108 MG/DL (ref 65–99)
HCT VFR BLD AUTO: 41.3 % (ref 35.6–45.5)
HGB BLD-MCNC: 13.8 G/DL (ref 11.9–15.5)
MCH RBC QN AUTO: 31.1 PG (ref 26.9–32)
MCHC RBC AUTO-ENTMCNC: 33.4 G/DL (ref 32.4–36.3)
MCV RBC AUTO: 93 FL (ref 80.5–98.2)
PLATELET # BLD AUTO: 268 10*3/MM3 (ref 140–500)
PMV BLD AUTO: 9.8 FL (ref 6–12)
POTASSIUM BLD-SCNC: 3.4 MMOL/L (ref 3.5–5.2)
PROT SERPL-MCNC: 7.3 G/DL (ref 6–8.5)
RBC # BLD AUTO: 4.44 10*6/MM3 (ref 3.9–5.2)
SODIUM BLD-SCNC: 140 MMOL/L (ref 136–145)
WBC NRBC COR # BLD: 9.14 10*3/MM3 (ref 4.5–10.7)

## 2018-01-04 PROCEDURE — 36415 COLL VENOUS BLD VENIPUNCTURE: CPT

## 2018-01-04 PROCEDURE — 93005 ELECTROCARDIOGRAM TRACING: CPT

## 2018-01-04 PROCEDURE — 71046 X-RAY EXAM CHEST 2 VIEWS: CPT

## 2018-01-04 PROCEDURE — 85027 COMPLETE CBC AUTOMATED: CPT | Performed by: SURGERY

## 2018-01-04 PROCEDURE — 93010 ELECTROCARDIOGRAM REPORT: CPT | Performed by: INTERNAL MEDICINE

## 2018-01-04 PROCEDURE — 80053 COMPREHEN METABOLIC PANEL: CPT | Performed by: SURGERY

## 2018-01-04 RX ORDER — OMEPRAZOLE 40 MG/1
40 CAPSULE, DELAYED RELEASE ORAL EVERY MORNING
COMMUNITY
End: 2018-01-09 | Stop reason: SDUPTHER

## 2018-01-04 RX ORDER — AMLODIPINE BESYLATE 10 MG/1
10 TABLET ORAL DAILY
COMMUNITY
End: 2018-01-09 | Stop reason: SDUPTHER

## 2018-01-04 NOTE — DISCHARGE INSTRUCTIONS
Take the following medications the morning of surgery with a small sip of water:    AMLODIPINE, METOPROLOL AND OMEPRAZOLE.    ARRIVE AT 11:00    General Instructions:  • Do not eat solid food after midnight the night before surgery.  • You may drink clear liquids day of surgery but must stop at least one hour before your hospital arrival time.  • It is beneficial for you to have a clear drink that contains carbohydrates the day of surgery.  We suggest a 12 to 20 ounce bottle of Gatorade or Powerade for non-diabetic patients or a 12 to 20 ounce bottle of G2 or Powerade Zero for diabetic patients. (Pediatric patients, are not advised to drink a 12 to 20 ounce carbohydrate drink)    Clear liquids are liquids you can see through.  Nothing red in color.     Plain water                               Sports drinks  Sodas                                   Gelatin (Jell-O)  Fruit juices without pulp such as white grape juice and apple juice  Popsicles that contain no fruit or yogurt  Tea or coffee (no cream or milk added)  Gatorade / Powerade  G2 / Powerade Zero    • Infants may have breast milk up to four hours before surgery.  • Infants drinking formula may drink formula up to six hours before surgery.   • Patients who avoid smoking, chewing tobacco and alcohol for 4 weeks prior to surgery have a reduced risk of post-operative complications.  Quit smoking as many days before surgery as you can.  • Do not smoke, use chewing tobacco or drink alcohol the day of surgery.   • If applicable bring your C-PAP/ BI-PAP machine.  • Bring any papers given to you in the doctor’s office.  • Wear clean comfortable clothes and socks.  • Do not wear contact lenses or make-up.  Bring a case for your glasses.   • Bring crutches or walker if applicable.  • Remove all piercings.  Leave jewelry and any other valuables at home.  • Hair extensions with metal clips must be removed prior to surgery.  • The Pre-Admission Testing nurse will instruct  you to bring medications if unable to obtain an accurate list in Pre-Admission Testing.        If you were given a blood bank ID arm band remember to bring it with you the day of surgery.    Preventing a Surgical Site Infection:  • For 2 to 3 days before surgery, avoid shaving with a razor because the razor can irritate skin and make it easier to develop an infection.  • The night prior to surgery sleep in a clean bed with clean clothing.  Do not allow pets to sleep with you.  • Shower on the morning of surgery using a fresh bar of anti-bacterial soap (such as Dial) and clean washcloth.  Dry with a clean towel and dress in clean clothing.  • Ask your surgeon if you will be receiving antibiotics prior to surgery.  • Make sure you, your family, and all healthcare providers clean their hands with soap and water or an alcohol based hand  before caring for you or your wound.    Day of surgery:  Upon arrival, a Pre-op nurse and Anesthesiologist will review your health history, obtain vital signs, and answer questions you may have.  The only belongings needed at this time will be your home medications and if applicable your C-PAP/BI-PAP machine.  If you are staying overnight your family can leave the rest of your belongings in the car and bring them to your room later.  A Pre-op nurse will start an IV and you may receive medication in preparation for surgery, including something to help you relax.  Your family will be able to see you in the Pre-op area.  While you are in surgery your family should notify the waiting room  if they leave the waiting room area and provide a contact phone number.    Please be aware that surgery does come with discomfort.  We want to make every effort to control your discomfort so please discuss any uncontrolled symptoms with your nurse.   Your doctor will most likely have prescribed pain medications.      If you are going home after surgery you will receive individualized  written care instructions before being discharged.  A responsible adult must drive you to and from the hospital on the day of your surgery and stay with you for 24 hours.    If you are staying overnight following surgery, you will be transported to your hospital room following the recovery period.  [facility] has all private rooms.    If you have any questions please call Pre-Admission Testing at [phone].  Deductibles and co-payments are collected on the day of service. Please be prepared to pay the required co-pay, deductible or deposit on the day of service as defined by your plan.

## 2018-01-08 RX ORDER — AMLODIPINE BESYLATE 10 MG/1
TABLET ORAL
Qty: 30 TABLET | Refills: 0 | OUTPATIENT
Start: 2018-01-08

## 2018-01-09 ENCOUNTER — TELEPHONE (OUTPATIENT)
Dept: FAMILY MEDICINE CLINIC | Facility: CLINIC | Age: 63
End: 2018-01-09

## 2018-01-09 RX ORDER — METOPROLOL SUCCINATE 25 MG/1
25 TABLET, EXTENDED RELEASE ORAL DAILY
Qty: 30 TABLET | Refills: 0 | Status: SHIPPED | OUTPATIENT
Start: 2018-01-09 | End: 2018-04-26

## 2018-01-09 RX ORDER — OMEPRAZOLE 40 MG/1
40 CAPSULE, DELAYED RELEASE ORAL EVERY MORNING
Qty: 30 CAPSULE | Refills: 0 | Status: SHIPPED | OUTPATIENT
Start: 2018-01-09 | End: 2018-04-26

## 2018-01-09 RX ORDER — AMLODIPINE BESYLATE 10 MG/1
10 TABLET ORAL DAILY
Qty: 30 TABLET | Refills: 0 | Status: SHIPPED | OUTPATIENT
Start: 2018-01-09 | End: 2018-02-06 | Stop reason: SDUPTHER

## 2018-01-09 NOTE — TELEPHONE ENCOUNTER
SENT RXS TO PHARMACY ON FILE    ----- Message from Arely Tao Rep sent at 1/9/2018 10:31 AM EST -----  PT NEEDS SCRIPT REFILL FOR    amLODIPine (NORVASC) 10 MG Take 10 mg by mouth Daily #30  metoprolol tartrate (LOPRESSOR) 25 MG Take 25 mg by mouth Every Morning #30  omeprazole (priLOSEC) 40 MG Take 40 mg by mouth Every Morning #30    PT HAS LAB SCHEDULED FOR SURGERY TOMORROW AT [facility] AND WILL BE OFF WORK AND UNABLE TO DRIVE FOR A FEW WEEKS SO SHE HAS LABS SCHEDULED FOR 02/12/18 AND APPT TO SEE DR ON 2/16/18.    PLEASE SEND TO [facility] [address] [facility] [address].    PLEASE CONTACT PT -[phone]

## 2018-01-09 NOTE — TELEPHONE ENCOUNTER
---spoke to patient  Correct rx was called into local pharmacy on file    -- Message from Arely Tao sent at 1/9/2018  3:36 PM EST -----  PT SSTATING THAT SCRIPT SHOULD BE FOR metoprolol IS INCORRECT.  IT SHOULD BE FOR metoprolol succinate XL (TOPROL-XL) 25 MG 24 hr 25 MG NOT FOR metoprolol tartrate (LOPRESSOR) 25 MG.  PT STATING SHE HAS ALWAYS TAKEN THE SUCCINATE AND HAVE NEVER TAKEN THE TARTRATE.    PLEASE LOOK INTO AND CORRECT.    CONTACT PT -[phone]

## 2018-01-10 ENCOUNTER — HOSPITAL ENCOUNTER (OUTPATIENT)
Dept: NUCLEAR MEDICINE | Facility: HOSPITAL | Age: 63
Discharge: HOME | End: 2018-01-10
Attending: SURGERY

## 2018-01-10 ENCOUNTER — ANESTHESIA EVENT (OUTPATIENT)
Dept: PERIOP | Facility: HOSPITAL | Age: 63
End: 2018-01-10

## 2018-01-10 ENCOUNTER — HOSPITAL ENCOUNTER (OUTPATIENT)
Facility: HOSPITAL | Age: 63
Setting detail: OBSERVATION
Discharge: HOME | End: 2018-01-11
Attending: SURGERY | Admitting: SURGERY

## 2018-01-10 ENCOUNTER — ANESTHESIA (OUTPATIENT)
Dept: PERIOP | Facility: HOSPITAL | Age: 63
End: 2018-01-10

## 2018-01-10 DIAGNOSIS — C50.811 MALIGNANT NEOPLASM OF OVERLAPPING SITES OF RIGHT BREAST IN FEMALE, ESTROGEN RECEPTOR POSITIVE (HCC): ICD-10-CM

## 2018-01-10 DIAGNOSIS — Z17.0 MALIGNANT NEOPLASM OF OVERLAPPING SITES OF RIGHT BREAST IN FEMALE, ESTROGEN RECEPTOR POSITIVE (HCC): ICD-10-CM

## 2018-01-10 PROCEDURE — G0378 HOSPITAL OBSERVATION PER HR: HCPCS

## 2018-01-10 PROCEDURE — 25010000002 MIDAZOLAM PER 1 MG: Performed by: ANESTHESIOLOGY

## 2018-01-10 PROCEDURE — 19303 MAST SIMPLE COMPLETE: CPT | Performed by: SURGERY

## 2018-01-10 PROCEDURE — 38900 IO MAP OF SENT LYMPH NODE: CPT | Performed by: SURGERY

## 2018-01-10 PROCEDURE — 38792 RA TRACER ID OF SENTINL NODE: CPT

## 2018-01-10 PROCEDURE — 94799 UNLISTED PULMONARY SVC/PX: CPT

## 2018-01-10 PROCEDURE — 0 TECHNETIUM FILTERED SULFUR COLLOID: Performed by: SURGERY

## 2018-01-10 PROCEDURE — 25010000002 FENTANYL CITRATE (PF) 100 MCG/2ML SOLUTION: Performed by: NURSE ANESTHETIST, CERTIFIED REGISTERED

## 2018-01-10 PROCEDURE — A9541 TC99M SULFUR COLLOID: HCPCS | Performed by: SURGERY

## 2018-01-10 PROCEDURE — 88307 TISSUE EXAM BY PATHOLOGIST: CPT | Performed by: SURGERY

## 2018-01-10 PROCEDURE — 25010000002 ONDANSETRON PER 1 MG: Performed by: NURSE ANESTHETIST, CERTIFIED REGISTERED

## 2018-01-10 PROCEDURE — 88331 PATH CONSLTJ SURG 1 BLK 1SPC: CPT | Performed by: SURGERY

## 2018-01-10 PROCEDURE — 38525 BIOPSY/REMOVAL LYMPH NODES: CPT | Performed by: SURGERY

## 2018-01-10 PROCEDURE — 25010000002 HYDROMORPHONE PER 4 MG: Performed by: NURSE ANESTHETIST, CERTIFIED REGISTERED

## 2018-01-10 PROCEDURE — 25010000002 DEXAMETHASONE PER 1 MG: Performed by: NURSE ANESTHETIST, CERTIFIED REGISTERED

## 2018-01-10 PROCEDURE — 25010000002 CALCIUM GLUCONATE 1 G/100 ML

## 2018-01-10 PROCEDURE — 25010000002 PROPOFOL 10 MG/ML EMULSION: Performed by: NURSE ANESTHETIST, CERTIFIED REGISTERED

## 2018-01-10 RX ORDER — HYDROCODONE BITARTRATE AND ACETAMINOPHEN 7.5; 325 MG/1; MG/1
1 TABLET ORAL ONCE AS NEEDED
Status: DISCONTINUED | OUTPATIENT
Start: 2018-01-10 | End: 2018-01-10 | Stop reason: HOSPADM

## 2018-01-10 RX ORDER — FENTANYL CITRATE 50 UG/ML
50 INJECTION, SOLUTION INTRAMUSCULAR; INTRAVENOUS
Status: DISCONTINUED | OUTPATIENT
Start: 2018-01-10 | End: 2018-01-10 | Stop reason: HOSPADM

## 2018-01-10 RX ORDER — ONDANSETRON 2 MG/ML
4 INJECTION INTRAMUSCULAR; INTRAVENOUS ONCE AS NEEDED
Status: DISCONTINUED | OUTPATIENT
Start: 2018-01-10 | End: 2018-01-10 | Stop reason: HOSPADM

## 2018-01-10 RX ORDER — MIDAZOLAM HYDROCHLORIDE 1 MG/ML
1 INJECTION INTRAMUSCULAR; INTRAVENOUS
Status: DISCONTINUED | OUTPATIENT
Start: 2018-01-10 | End: 2018-01-10 | Stop reason: HOSPADM

## 2018-01-10 RX ORDER — FLUMAZENIL 0.1 MG/ML
0.2 INJECTION INTRAVENOUS AS NEEDED
Status: DISCONTINUED | OUTPATIENT
Start: 2018-01-10 | End: 2018-01-10 | Stop reason: HOSPADM

## 2018-01-10 RX ORDER — NALOXONE HCL 0.4 MG/ML
0.1 VIAL (ML) INJECTION
Status: DISCONTINUED | OUTPATIENT
Start: 2018-01-10 | End: 2018-01-11 | Stop reason: HOSPADM

## 2018-01-10 RX ORDER — ONDANSETRON 2 MG/ML
INJECTION INTRAMUSCULAR; INTRAVENOUS AS NEEDED
Status: DISCONTINUED | OUTPATIENT
Start: 2018-01-10 | End: 2018-01-10 | Stop reason: SURG

## 2018-01-10 RX ORDER — FAMOTIDINE 10 MG/ML
20 INJECTION, SOLUTION INTRAVENOUS ONCE
Status: COMPLETED | OUTPATIENT
Start: 2018-01-10 | End: 2018-01-10

## 2018-01-10 RX ORDER — ROCURONIUM BROMIDE 10 MG/ML
INJECTION, SOLUTION INTRAVENOUS AS NEEDED
Status: DISCONTINUED | OUTPATIENT
Start: 2018-01-10 | End: 2018-01-10 | Stop reason: SURG

## 2018-01-10 RX ORDER — CELECOXIB 200 MG/1
400 CAPSULE ORAL ONCE
Status: COMPLETED | OUTPATIENT
Start: 2018-01-10 | End: 2018-01-10

## 2018-01-10 RX ORDER — EPHEDRINE SULFATE 50 MG/ML
5 INJECTION, SOLUTION INTRAVENOUS ONCE AS NEEDED
Status: DISCONTINUED | OUTPATIENT
Start: 2018-01-10 | End: 2018-01-10 | Stop reason: HOSPADM

## 2018-01-10 RX ORDER — SODIUM CHLORIDE, SODIUM LACTATE, POTASSIUM CHLORIDE, CALCIUM CHLORIDE 600; 310; 30; 20 MG/100ML; MG/100ML; MG/100ML; MG/100ML
100 INJECTION, SOLUTION INTRAVENOUS CONTINUOUS
Status: DISCONTINUED | OUTPATIENT
Start: 2018-01-10 | End: 2018-01-11 | Stop reason: HOSPADM

## 2018-01-10 RX ORDER — OXYCODONE HYDROCHLORIDE AND ACETAMINOPHEN 5; 325 MG/1; MG/1
1 TABLET ORAL EVERY 4 HOURS PRN
Status: DISCONTINUED | OUTPATIENT
Start: 2018-01-10 | End: 2018-01-11 | Stop reason: HOSPADM

## 2018-01-10 RX ORDER — HYDROMORPHONE HYDROCHLORIDE 1 MG/ML
0.5 INJECTION, SOLUTION INTRAMUSCULAR; INTRAVENOUS; SUBCUTANEOUS
Status: DISCONTINUED | OUTPATIENT
Start: 2018-01-10 | End: 2018-01-11 | Stop reason: HOSPADM

## 2018-01-10 RX ORDER — PROMETHAZINE HYDROCHLORIDE 25 MG/1
25 SUPPOSITORY RECTAL ONCE AS NEEDED
Status: DISCONTINUED | OUTPATIENT
Start: 2018-01-10 | End: 2018-01-10 | Stop reason: HOSPADM

## 2018-01-10 RX ORDER — CLINDAMYCIN PHOSPHATE 900 MG/50ML
900 INJECTION INTRAVENOUS ONCE
Status: COMPLETED | OUTPATIENT
Start: 2018-01-10 | End: 2018-01-10

## 2018-01-10 RX ORDER — MIDAZOLAM HYDROCHLORIDE 1 MG/ML
2 INJECTION INTRAMUSCULAR; INTRAVENOUS
Status: DISCONTINUED | OUTPATIENT
Start: 2018-01-10 | End: 2018-01-10 | Stop reason: HOSPADM

## 2018-01-10 RX ORDER — DEXTROSE, SODIUM CHLORIDE, AND POTASSIUM CHLORIDE 5; .45; .15 G/100ML; G/100ML; G/100ML
50 INJECTION INTRAVENOUS CONTINUOUS
Status: DISCONTINUED | OUTPATIENT
Start: 2018-01-10 | End: 2018-01-11 | Stop reason: HOSPADM

## 2018-01-10 RX ORDER — AMLODIPINE BESYLATE 10 MG/1
10 TABLET ORAL DAILY
Status: DISCONTINUED | OUTPATIENT
Start: 2018-01-10 | End: 2018-01-11 | Stop reason: HOSPADM

## 2018-01-10 RX ORDER — NALOXONE HCL 0.4 MG/ML
0.2 VIAL (ML) INJECTION AS NEEDED
Status: DISCONTINUED | OUTPATIENT
Start: 2018-01-10 | End: 2018-01-10 | Stop reason: HOSPADM

## 2018-01-10 RX ORDER — HYDROMORPHONE HCL 110MG/55ML
0.5 PATIENT CONTROLLED ANALGESIA SYRINGE INTRAVENOUS
Status: DISCONTINUED | OUTPATIENT
Start: 2018-01-10 | End: 2018-01-10 | Stop reason: HOSPADM

## 2018-01-10 RX ORDER — PROMETHAZINE HYDROCHLORIDE 25 MG/ML
12.5 INJECTION, SOLUTION INTRAMUSCULAR; INTRAVENOUS ONCE AS NEEDED
Status: DISCONTINUED | OUTPATIENT
Start: 2018-01-10 | End: 2018-01-10 | Stop reason: HOSPADM

## 2018-01-10 RX ORDER — LIDOCAINE AND PRILOCAINE 25; 25 MG/G; MG/G
CREAM TOPICAL ONCE
Status: COMPLETED | OUTPATIENT
Start: 2018-01-10 | End: 2018-01-10

## 2018-01-10 RX ORDER — FENTANYL CITRATE 50 UG/ML
INJECTION, SOLUTION INTRAMUSCULAR; INTRAVENOUS AS NEEDED
Status: DISCONTINUED | OUTPATIENT
Start: 2018-01-10 | End: 2018-01-10 | Stop reason: SURG

## 2018-01-10 RX ORDER — METOPROLOL SUCCINATE 25 MG/1
25 TABLET, EXTENDED RELEASE ORAL DAILY
Status: DISCONTINUED | OUTPATIENT
Start: 2018-01-10 | End: 2018-01-11 | Stop reason: HOSPADM

## 2018-01-10 RX ORDER — PROMETHAZINE HYDROCHLORIDE 25 MG/1
25 TABLET ORAL ONCE AS NEEDED
Status: DISCONTINUED | OUTPATIENT
Start: 2018-01-10 | End: 2018-01-10 | Stop reason: HOSPADM

## 2018-01-10 RX ORDER — DIAZEPAM 5 MG/1
10 TABLET ORAL EVERY 6 HOURS PRN
Status: COMPLETED | OUTPATIENT
Start: 2018-01-10 | End: 2018-01-10

## 2018-01-10 RX ORDER — NALOXONE HCL 0.4 MG/ML
0.4 VIAL (ML) INJECTION
Status: DISCONTINUED | OUTPATIENT
Start: 2018-01-10 | End: 2018-01-11 | Stop reason: HOSPADM

## 2018-01-10 RX ORDER — ONDANSETRON 4 MG/1
4 TABLET, ORALLY DISINTEGRATING ORAL EVERY 6 HOURS PRN
Status: DISCONTINUED | OUTPATIENT
Start: 2018-01-10 | End: 2018-01-11 | Stop reason: HOSPADM

## 2018-01-10 RX ORDER — ONDANSETRON 4 MG/1
4 TABLET, FILM COATED ORAL EVERY 6 HOURS PRN
Status: DISCONTINUED | OUTPATIENT
Start: 2018-01-10 | End: 2018-01-11 | Stop reason: HOSPADM

## 2018-01-10 RX ORDER — LABETALOL HYDROCHLORIDE 5 MG/ML
5 INJECTION, SOLUTION INTRAVENOUS
Status: DISCONTINUED | OUTPATIENT
Start: 2018-01-10 | End: 2018-01-10 | Stop reason: HOSPADM

## 2018-01-10 RX ORDER — MAGNESIUM HYDROXIDE 1200 MG/15ML
LIQUID ORAL AS NEEDED
Status: DISCONTINUED | OUTPATIENT
Start: 2018-01-10 | End: 2018-01-10 | Stop reason: HOSPADM

## 2018-01-10 RX ORDER — DIPHENHYDRAMINE HYDROCHLORIDE 50 MG/ML
12.5 INJECTION INTRAMUSCULAR; INTRAVENOUS
Status: DISCONTINUED | OUTPATIENT
Start: 2018-01-10 | End: 2018-01-10 | Stop reason: HOSPADM

## 2018-01-10 RX ORDER — CLINDAMYCIN PHOSPHATE 600 MG/50ML
600 INJECTION INTRAVENOUS EVERY 8 HOURS
Status: COMPLETED | OUTPATIENT
Start: 2018-01-10 | End: 2018-01-11

## 2018-01-10 RX ORDER — HYDRALAZINE HYDROCHLORIDE 20 MG/ML
5 INJECTION INTRAMUSCULAR; INTRAVENOUS
Status: DISCONTINUED | OUTPATIENT
Start: 2018-01-10 | End: 2018-01-10 | Stop reason: HOSPADM

## 2018-01-10 RX ORDER — IPRATROPIUM BROMIDE AND ALBUTEROL SULFATE 2.5; .5 MG/3ML; MG/3ML
3 SOLUTION RESPIRATORY (INHALATION) ONCE AS NEEDED
Status: DISCONTINUED | OUTPATIENT
Start: 2018-01-10 | End: 2018-01-10 | Stop reason: HOSPADM

## 2018-01-10 RX ORDER — SODIUM CHLORIDE, SODIUM LACTATE, POTASSIUM CHLORIDE, CALCIUM CHLORIDE 600; 310; 30; 20 MG/100ML; MG/100ML; MG/100ML; MG/100ML
9 INJECTION, SOLUTION INTRAVENOUS CONTINUOUS
Status: DISCONTINUED | OUTPATIENT
Start: 2018-01-10 | End: 2018-01-10

## 2018-01-10 RX ORDER — LIDOCAINE HYDROCHLORIDE 20 MG/ML
INJECTION, SOLUTION INFILTRATION; PERINEURAL AS NEEDED
Status: DISCONTINUED | OUTPATIENT
Start: 2018-01-10 | End: 2018-01-10 | Stop reason: SURG

## 2018-01-10 RX ORDER — ACETAMINOPHEN 500 MG
1000 TABLET ORAL ONCE
Status: COMPLETED | OUTPATIENT
Start: 2018-01-10 | End: 2018-01-10

## 2018-01-10 RX ORDER — ONDANSETRON 2 MG/ML
4 INJECTION INTRAMUSCULAR; INTRAVENOUS EVERY 6 HOURS PRN
Status: DISCONTINUED | OUTPATIENT
Start: 2018-01-10 | End: 2018-01-11 | Stop reason: HOSPADM

## 2018-01-10 RX ORDER — HYDROMORPHONE HCL 110MG/55ML
PATIENT CONTROLLED ANALGESIA SYRINGE INTRAVENOUS AS NEEDED
Status: DISCONTINUED | OUTPATIENT
Start: 2018-01-10 | End: 2018-01-10 | Stop reason: SURG

## 2018-01-10 RX ORDER — DEXAMETHASONE SODIUM PHOSPHATE 10 MG/ML
INJECTION INTRAMUSCULAR; INTRAVENOUS AS NEEDED
Status: DISCONTINUED | OUTPATIENT
Start: 2018-01-10 | End: 2018-01-10 | Stop reason: SURG

## 2018-01-10 RX ORDER — SODIUM CHLORIDE 0.9 % (FLUSH) 0.9 %
1-10 SYRINGE (ML) INJECTION AS NEEDED
Status: DISCONTINUED | OUTPATIENT
Start: 2018-01-10 | End: 2018-01-10 | Stop reason: HOSPADM

## 2018-01-10 RX ORDER — PROPOFOL 10 MG/ML
VIAL (ML) INTRAVENOUS AS NEEDED
Status: DISCONTINUED | OUTPATIENT
Start: 2018-01-10 | End: 2018-01-10 | Stop reason: SURG

## 2018-01-10 RX ADMIN — SUGAMMADEX 200 MG: 100 INJECTION, SOLUTION INTRAVENOUS at 16:35

## 2018-01-10 RX ADMIN — FENTANYL CITRATE 50 MCG: 50 INJECTION, SOLUTION INTRAMUSCULAR; INTRAVENOUS at 17:04

## 2018-01-10 RX ADMIN — ROCURONIUM BROMIDE 50 MG: 10 INJECTION INTRAVENOUS at 14:33

## 2018-01-10 RX ADMIN — FENTANYL CITRATE 50 MCG: 50 INJECTION, SOLUTION INTRAMUSCULAR; INTRAVENOUS at 17:32

## 2018-01-10 RX ADMIN — HYDROMORPHONE HYDROCHLORIDE 0.5 MG: 2 INJECTION INTRAMUSCULAR; INTRAVENOUS; SUBCUTANEOUS at 15:07

## 2018-01-10 RX ADMIN — Medication 1 MG: at 12:44

## 2018-01-10 RX ADMIN — PROPOFOL 200 MG: 10 INJECTION, EMULSION INTRAVENOUS at 14:33

## 2018-01-10 RX ADMIN — SODIUM CHLORIDE, POTASSIUM CHLORIDE, SODIUM LACTATE AND CALCIUM CHLORIDE 9 ML/HR: 600; 310; 30; 20 INJECTION, SOLUTION INTRAVENOUS at 12:44

## 2018-01-10 RX ADMIN — HYDROMORPHONE HYDROCHLORIDE 0.5 MG: 2 INJECTION INTRAMUSCULAR; INTRAVENOUS; SUBCUTANEOUS at 18:15

## 2018-01-10 RX ADMIN — HYDROMORPHONE HYDROCHLORIDE 0.5 MG: 2 INJECTION INTRAMUSCULAR; INTRAVENOUS; SUBCUTANEOUS at 17:47

## 2018-01-10 RX ADMIN — CLINDAMYCIN PHOSPHATE 600 MG: 12 INJECTION, SOLUTION INTRAMUSCULAR; INTRAVENOUS at 23:21

## 2018-01-10 RX ADMIN — ACETAMINOPHEN 1000 MG: 500 TABLET ORAL at 11:47

## 2018-01-10 RX ADMIN — LIDOCAINE HYDROCHLORIDE 60 MG: 20 INJECTION, SOLUTION INFILTRATION; PERINEURAL at 14:33

## 2018-01-10 RX ADMIN — FENTANYL CITRATE 100 MCG: 50 INJECTION, SOLUTION INTRAMUSCULAR; INTRAVENOUS at 14:37

## 2018-01-10 RX ADMIN — POTASSIUM CHLORIDE, DEXTROSE MONOHYDRATE AND SODIUM CHLORIDE 50 ML/HR: 150; 5; 450 INJECTION, SOLUTION INTRAVENOUS at 20:57

## 2018-01-10 RX ADMIN — LIDOCAINE AND PRILOCAINE: 25; 25 CREAM TOPICAL at 11:47

## 2018-01-10 RX ADMIN — DIAZEPAM 10 MG: 5 TABLET ORAL at 12:23

## 2018-01-10 RX ADMIN — HYDROMORPHONE HYDROCHLORIDE 0.5 MG: 2 INJECTION INTRAMUSCULAR; INTRAVENOUS; SUBCUTANEOUS at 18:32

## 2018-01-10 RX ADMIN — SODIUM CHLORIDE, POTASSIUM CHLORIDE, SODIUM LACTATE AND CALCIUM CHLORIDE: 600; 310; 30; 20 INJECTION, SOLUTION INTRAVENOUS at 16:33

## 2018-01-10 RX ADMIN — HYDROMORPHONE HYDROCHLORIDE 0.5 MG: 2 INJECTION INTRAMUSCULAR; INTRAVENOUS; SUBCUTANEOUS at 15:11

## 2018-01-10 RX ADMIN — ONDANSETRON 4 MG: 2 INJECTION INTRAMUSCULAR; INTRAVENOUS at 16:14

## 2018-01-10 RX ADMIN — FAMOTIDINE 20 MG: 10 INJECTION, SOLUTION INTRAVENOUS at 12:44

## 2018-01-10 RX ADMIN — CELECOXIB 400 MG: 200 CAPSULE ORAL at 11:47

## 2018-01-10 RX ADMIN — FENTANYL CITRATE 50 MCG: 50 INJECTION, SOLUTION INTRAMUSCULAR; INTRAVENOUS at 15:02

## 2018-01-10 RX ADMIN — OXYCODONE HYDROCHLORIDE AND ACETAMINOPHEN 1 TABLET: 5; 325 TABLET ORAL at 20:54

## 2018-01-10 RX ADMIN — FENTANYL CITRATE 100 MCG: 50 INJECTION, SOLUTION INTRAMUSCULAR; INTRAVENOUS at 14:33

## 2018-01-10 RX ADMIN — CLINDAMYCIN PHOSPHATE 900 MG: 900 INJECTION INTRAVENOUS at 14:26

## 2018-01-10 RX ADMIN — HYDROMORPHONE HYDROCHLORIDE 0.5 MG: 2 INJECTION INTRAMUSCULAR; INTRAVENOUS; SUBCUTANEOUS at 17:14

## 2018-01-10 RX ADMIN — DEXAMETHASONE SODIUM PHOSPHATE 6 MG: 10 INJECTION INTRAMUSCULAR; INTRAVENOUS at 15:03

## 2018-01-10 RX ADMIN — TECHNETIUM TC 99M SULFUR COLLOID 1 DOSE: KIT at 12:40

## 2018-01-10 NOTE — OP NOTE
Name: Adele Ly  Age: 62 y.o.  Sex: female  :  1955  MRN: [id number]    Mastectomy with SN Procedure Note    Indications: This patient presents for surgical treatment of Breast Cancer involving overlapping sites of the right breast.  She is not a good candidate for breast conserving surgery and presents now for a mastectomy.    Pre-operative Diagnosis: breast cancer, right    Post-operative Diagnosis: same    Procedure:  right  mastectomy, [place] Node    Surgeon: Juan Peterson MD, FACS    Assistants: Anne Cabrera    Anesthesia: General anesthesia      Procedure Details    The patient was seen again in the Holding Room. The risks, benefits, indications, potential complications, treatment options, and expected outcomes were discussed with the patient. The possibilities of reaction to medication, pulmonary aspiration, bleeding, recurrent infection, the need for additional procedures, failure to diagnose a condition, and creating a complication requiring transfusion or further operation were discussed with the patient. The patient and/or family concurred with the proposed plan, giving informed consent. The site of surgery was properly noted/marked.    The patient was also taken to the nuclear med department where a radioisotope injection was performed in the periareolar area of the affected breast  in the usual fashion.       The patient was taken to the Operating Room, identified as Adele Ly  and the procedure verified as right total mastectomy with sentinel lymph node biopsy.   A Time Out was held and the above information confirmed.          The patient was placed supine and general anesthetic was administered.     5 cc of blue dye were injected into the breast near the edge of the areola.  The  arm, breast, and chest were prepped and draped in standard fashion.   An oblique elliptical incision was made encompassing the nipple of the .right breast.  . Skin flaps were created meticulously  to preserve the subdermal blood supply.  Flaps were developed to the clavicle, rectus sheath, sternum, and anterior edge of the latissimus dorsi m.    [place] node evaluation was performed. 2 sentinel node(s) was/were found.  Both of these lymph nodes were blue and the first lymph node had counts of 1100 and the second lymph node had counts of 185. . No other nodes were found with counts over 110 and there were no other blue nodes.  Frozen section was performed and both lymph nodes were benign.     Dissection was carried down to the pectoralis fascia, which was included with the specimen, and an axillary dissection  was not performed.,       The specimen was submitted to pathology. The wound was irrigated. One ARMOND drain was placed.  The skin incision was closed in layers with a 4-0 Vicryl subcuticular suture.. The drain was secured with 2-0 silk sutures. Steri-Strips were applied along with a dry bulky gauze dressing.        Instrument, sponge, and needle counts were correct at closure and at the conclusion of the case.     Findings: There were no unexpected findings.     Estimated Blood Loss: less than 50 mL           Drains: 19 Egyptian Ismael-Garcia ×1      Specimens:   ID Type Source Tests Collected by Time Destination   A : sentinel lymph node #1 Tissue [place] Lymph Node TISSUE EXAM Juan Peterson MD 1/10/2018 1457    B : sentinel lymph node #2 Tissue [place] Lymph Node TISSUE EXAM Juan Peterson MD 1/10/2018 1457    C : right breast stitch marks 12 o' clock, 1144 grams Tissue Breast, Right TISSUE EXAM Juan Peterson MD 1/10/2018 1548        Complications: None; patient tolerated the procedure well.           Disposition: PACU - hemodynamically stable.           Condition: stable

## 2018-01-10 NOTE — ANESTHESIA POSTPROCEDURE EVALUATION
"Patient: Adele Ly    Procedure Summary     Date Anesthesia Start Anesthesia Stop Room / Location    01/10/18 8217 1006  [facility] /  [facility]       Procedure Diagnosis Surgeon Provider    BREAST MASTECTOMY WITH SENTINEL NODE BIOPSY (Right Breast) Malignant neoplasm of overlapping sites of right breast in female, estrogen receptor positive  (Malignant neoplasm of overlapping sites of right breast in female, estrogen receptor positive [C50.811, Z17.0]) MD Michael العراقي MD          Anesthesia Type: general  Last vitals  BP   148/91 (01/10/18 1815)   Temp   37.6 °C (99.6 °F) (01/10/18 1658)   Pulse   76 (01/10/18 1815)   Resp   20 (01/10/18 1815)     SpO2   96 % (01/10/18 1815)     Post Anesthesia Care and Evaluation    Patient location during evaluation: bedside  Patient participation: complete - patient participated  Level of consciousness: awake and alert  Pain management: adequate  Airway patency: patent  Anesthetic complications: No anesthetic complications    Cardiovascular status: acceptable  Respiratory status: acceptable  Hydration status: acceptable    Comments: /91  Pulse 76  Temp 37.6 °C (99.6 °F) (Oral)   Resp 20  Ht 172.7 cm (68\")  Wt 76.3 kg (168 lb 2 oz)  SpO2 96%  BMI 25.56 kg/m2      "

## 2018-01-10 NOTE — H&P
History of Present Illness:  Patient presents with newly diagnosed breast cancer.  The patient apparently had mammograms last year which described a large area of calcifications in the upper outer quadrant of the right breast measuring 12 x 6 cm.  There was also a separate area of calcifications about 4 cm away.  The patient did not pursue evaluation of this and returned for imaging studies again this year.  In the posterior one third of the upper outer quadrant of the right breast, there was an area of focal asymmetry in association with the calcifications.  There were also a number of calcifications in the posterior one third of the right breast at 9:00.  The patient underwent diagnostic mammography and was noted to have 2 separate clusters of suspicious calcifications.  These appeared to be  by about 50 mm.  A biopsy was obtained at the 10:30 position as well as the 9 o'clock position.  At the 10:30 position, invasive ductal cancer was discovered.  This tumor was grade 2 and was associated with some high-grade DCIS.  This invasive cancer was ER positive at greater than 90%, SC positive at 90%, and HER-2 positive at 3+.  She also had biopsy of an area at the 9 o'clock position which revealed high-grade DCIS without definite invasion.  Prior to these studies, the patient had not detected any palpable masses and there was no evidence of nipple discharge.  Her family history is significant for a maternal grandmother with breast cancer as well as a maternal aunt with breast cancer.  There was also a paternal aunt with ovarian cancer and a paternal uncle with colon cancer.  The patient has been taking hormone replacement therapy up until about 2 weeks ago.  She is a smoker and does not have any children.  I have personally reviewed her imaging studies and agree with the findings.        Review of Systems:  Review of Systems   Skin:        Right breast with bruising and tenderness, but no other changes to the  skin of the breast   All other systems reviewed and are negative.                  History   Smoking Status   • Current Every Day Smoker   • Packs/day: 1.00   • Types: Cigarettes   • Start date: 1975   Smokeless Tobacco   • Not on file             Surgical History          Past Surgical History:   Procedure Laterality Date   • BREAST BIOPSY Right 2017     malignant   • COLON RESECTION       • COLOSTOMY       • COLOSTOMY REVISION       • PELVIC LAPAROSCOPY       • TONSILLECTOMY       • WISDOM TOOTH EXTRACTION                 Medical History         Past Medical History:   Diagnosis Date   • Arthritis     • Colon polyp     • Emphysema of lung     • Hypertension     • Infectious viral hepatitis              Prior Hospitalizations, other than for surgery or childbirth, and year:  none     Social History:  Patient is single.  Patient has no children.          Vital Signs:Blood pressure 155/100, pulse 77, temperature 98.1, O2 saturation 97%   Medications:     Current Outpatient Prescriptions:      Current Outpatient Prescriptions:   •  acyclovir (ZOVIRAX) 400 MG tablet, Take 1 tablet by mouth Daily., Disp: 30 tablet, Rfl: 4  •  amLODIPine (NORVASC) 10 MG tablet, TAKE ONE TABLET BY MOUTH DAILY, Disp: 30 tablet, Rfl: 0  •  Apremilast (OTEZLA PO), Take  by mouth., Disp: , Rfl:   •  cetirizine (ZYRTEC ALLERGY) 10 MG tablet, Take 10 mg by mouth., Disp: , Rfl:   •  ibuprofen (ADVIL,MOTRIN) 200 MG tablet, Take 200 mg by mouth Every 6 (Six) Hours As Needed., Disp: , Rfl:   •  metoprolol succinate XL (TOPROL-XL) 25 MG 24 hr tablet, TAKE ONE TABLET BY MOUTH DAILY, Disp: 30 tablet, Rfl: 0  •  omeprazole (priLOSEC) 40 MG capsule, TAKE ONE CAPSULE BY MOUTH DAILY, Disp: 30 capsule, Rfl: 0  •  TURMERIC PO, Take  by mouth., Disp: , Rfl:      Physical Examination:  General Appearance:   Patient is in no distress.  She is well kept and has an average build.   Psychiatric:  Patient with appropriate mood and affect. Alert and oriented to  self, time, and place.     Breast, RIGHT:  medium sized, symmetric with the contralateral side.  Breast skin is without erythema, edema, rashes.  There are no visible abnormalities upon inspection during the arm-raising maneuver or with hands on hips in the sitting position. There is no nipple retraction, discharge or nipple/areolar skin changes.There are too small biopsy scars in the lateral aspect of the breast from her recent biopsies.  There is significant bruising around the more inferior one as well as tenderness.  There is some firmness associated with his bruising but I think it is all related to a hematoma.  The breast tissue does feel fairly dense.          Lymphatic:  There is no axillary, cervical, infraclavicular, or supraclavicular adenopathy bilaterally.    Cardiovascular:  Heart rate and rhythm are regular.  Respiratory:  Lungs are clear bilaterally with no crackles or wheezes in any lung field.  Gastrointestinal:  Abdomen is soft, nondistended, and nontender.  There was no evidence of hepatosplenomegaly or abdominal mass.  There are  scars from previous colon surgery.          Assessment:  1. Malignant neoplasm of overlapping sites of right breast in female, estrogen receptor positive      Plan-the patient has malignancy in 2 quadrants of the breast and is not a good candidate for breast conserving surgery.  He is here for mastectomy.  She is a smoker and will have delayed reconstruction.  The patient does understand that she may need an axillary dissection if her sentinel lymph nodes are positive on frozen section.

## 2018-01-10 NOTE — ANESTHESIA PREPROCEDURE EVALUATION
Anesthesia Evaluation     Patient summary reviewed and Nursing notes reviewed   no history of anesthetic complications:  NPO Solid Status: > 6 hours  NPO Liquid Status: > 2 hours     Airway   Mallampati: II  TM distance: >3 FB  Neck ROM: limited  no difficulty expected  Dental - normal exam     Pulmonary - normal exam   (+) a smoker (counseled to quit) Smoked day of surgery, COPD mild,   (-) shortness of breath  Cardiovascular - normal exam    ECG reviewed    (+) hypertension,   (-) angina      Neuro/Psych  GI/Hepatic/Renal/Endo      Musculoskeletal     Abdominal    Substance History      OB/GYN          Other      history of cancer active                                            Anesthesia Plan    ASA 2     general     Anesthetic plan and risks discussed with patient.

## 2018-01-10 NOTE — ANESTHESIA PROCEDURE NOTES
Airway  Urgency: elective    Airway not difficult    General Information and Staff    Patient location during procedure: OR  Anesthesiologist: KUNAL BUSTOS  CRNA: MARION CHRISTENSEN    Indications and Patient Condition  Indications for airway management: airway protection    Preoxygenated: yes  MILS not maintained throughout  Mask difficulty assessment: 1 - vent by mask    Final Airway Details  Final airway type: endotracheal airway      Successful airway: ETT  Cuffed: yes   Successful intubation technique: direct laryngoscopy  Facilitating devices/methods: intubating stylet  Endotracheal tube insertion site: oral  Blade: Katrina  Blade size: #3  ETT size: 7.0 mm  Cormack-Lehane Classification: grade I - full view of glottis  Placement verified by: chest auscultation   Cuff volume (mL): 8  Measured from: lips  ETT to lips (cm): 21  Number of attempts at approach: 1    Additional Comments  PreO2 100% face mask, IV induction, easy mask, DVL x1, cords noted, tube through, cuff up, EBBSH, +etCO2, = chest movement, tube secured in place, atraumatic, teeth and lips intact as preop.

## 2018-01-11 ENCOUNTER — APPOINTMENT (OUTPATIENT)
Dept: MAMMOGRAPHY | Facility: HOSPITAL | Age: 63
End: 2018-01-11

## 2018-01-11 VITALS
TEMPERATURE: 97.5 F | RESPIRATION RATE: 18 BRPM | OXYGEN SATURATION: 98 % | BODY MASS INDEX: 25.48 KG/M2 | SYSTOLIC BLOOD PRESSURE: 153 MMHG | WEIGHT: 168.13 LBS | HEART RATE: 78 BPM | HEIGHT: 68 IN | DIASTOLIC BLOOD PRESSURE: 91 MMHG

## 2018-01-11 PROCEDURE — G0378 HOSPITAL OBSERVATION PER HR: HCPCS

## 2018-01-11 PROCEDURE — 76098 X-RAY EXAM SURGICAL SPECIMEN: CPT

## 2018-01-11 PROCEDURE — 99024 POSTOP FOLLOW-UP VISIT: CPT | Performed by: SURGERY

## 2018-01-11 RX ORDER — OXYCODONE HYDROCHLORIDE AND ACETAMINOPHEN 5; 325 MG/1; MG/1
1-2 TABLET ORAL EVERY 4 HOURS PRN
Qty: 30 TABLET | Refills: 0 | Status: SHIPPED | OUTPATIENT
Start: 2018-01-11 | End: 2018-02-01

## 2018-01-11 RX ADMIN — OXYCODONE HYDROCHLORIDE AND ACETAMINOPHEN 1 TABLET: 5; 325 TABLET ORAL at 06:05

## 2018-01-11 RX ADMIN — OXYCODONE HYDROCHLORIDE AND ACETAMINOPHEN 1 TABLET: 5; 325 TABLET ORAL at 01:00

## 2018-01-11 RX ADMIN — HYDROMORPHONE HYDROCHLORIDE 0.5 MG: 10 INJECTION INTRAMUSCULAR; INTRAVENOUS; SUBCUTANEOUS at 02:47

## 2018-01-11 RX ADMIN — METOPROLOL SUCCINATE 25 MG: 25 TABLET, FILM COATED, EXTENDED RELEASE ORAL at 08:28

## 2018-01-11 RX ADMIN — AMLODIPINE BESYLATE 10 MG: 10 TABLET ORAL at 08:29

## 2018-01-11 RX ADMIN — OXYCODONE HYDROCHLORIDE AND ACETAMINOPHEN 1 TABLET: 5; 325 TABLET ORAL at 10:42

## 2018-01-11 RX ADMIN — CLINDAMYCIN PHOSPHATE 600 MG: 12 INJECTION, SOLUTION INTRAMUSCULAR; INTRAVENOUS at 06:06

## 2018-01-11 NOTE — PLAN OF CARE
Problem: Perioperative Period (Adult)  Goal: Signs and Symptoms of Listed Potential Problems Will be Absent or Manageable (Perioperative Period)  Outcome: Ongoing (interventions implemented as appropriate)   01/10/18 3123   Perioperative Period   Problems Assessed (Perioperative Period) all   Problems Present (Perioperative Period) pain   PACU STAY: Surgical site dressings all  CD+I at transfer to floor.  ARMOND drain output 30cc. Pain well controlled with Dilaudid 2mg  And Fentanly 100mcg in total while in PACU. Patient with known multi year smoking history. O2 sats 95-96% on 4L N[place] at transfer

## 2018-01-11 NOTE — PLAN OF CARE
Problem: Patient Care Overview (Adult)  Goal: Plan of Care Review  Outcome: Ongoing (interventions implemented as appropriate)   01/11/18 0505   Coping/Psychosocial Response Interventions   Plan Of Care Reviewed With patient   Patient Care Overview   Progress improving   Outcome Evaluation   Outcome Summary/Follow up Plan VSS, TOLERATING REGULAR DIET, ENCOURAGE TO INCREASE FLUID INTAKE AND TO USE INCENTIVE SPIROMETER AND AMBULATE MORE IN THE HALLWAY, VOIDING FREELY, C/O PAIN MEDICATED, UP WITH ASSIST.     Goal: Adult Individualization and Mutuality  Outcome: Ongoing (interventions implemented as appropriate)    Goal: Discharge Needs Assessment  Outcome: Ongoing (interventions implemented as appropriate)      Problem: Perioperative Period (Adult)  Goal: Signs and Symptoms of Listed Potential Problems Will be Absent or Manageable (Perioperative Period)  Outcome: Ongoing (interventions implemented as appropriate)

## 2018-01-11 NOTE — PROGRESS NOTES
Postoperative rounding note breast surgery    Postoperative day: 1     Overnight events:  She has done pretty well overnight.  Her pain is controlled with oral pain medication and she has had no nausea or vomiting.  Patient is ambulating    Vitals:    01/11/18 0329   BP: 134/78   Pulse: 67   Resp: 16   Temp: 97.3 °F (36.3 °C)   SpO2: 96%       Examination:     The patient is alert and oriented.  Her Ace bandage is in place and her dressings/incisions are clean dry and intact.  There are no undrained fluid collections underneath her mastectomy flaps.  Her drains are serosanguineous.    Assessment:  Postoperative day 1 doing well.     We'll plan to saline lock her IV fluids.  We will have her complete her drain teaching.  We will make sure that she is adequately ambulating and tolerates her breakfast and/or lunch.  She may go home later today.        She already has a follow-up visit with me scheduled.  I asked her to call the officeIn 5 days with her drainage amounts.  I will also call her with the path report tomorrow.

## 2018-01-12 LAB
LAB AP CASE REPORT: NORMAL
LAB AP CLINICAL INFORMATION: NORMAL
Lab: NORMAL
Lab: NORMAL
PATH REPORT.FINAL DX SPEC: NORMAL
PATH REPORT.GROSS SPEC: NORMAL

## 2018-01-15 ENCOUNTER — TELEPHONE (OUTPATIENT)
Dept: MAMMOGRAPHY | Facility: CLINIC | Age: 63
End: 2018-01-15

## 2018-01-15 NOTE — TELEPHONE ENCOUNTER
I told her the path report showed close margins in relationship to the DCIS that she has but she has undergone a mastectomy and there is no way to go more anterior or deeper.  She will call us with drainage amounts tomorrow.

## 2018-01-15 NOTE — PROGRESS NOTES
Case Management Discharge Note    Final Note: Home    Discharge Placement     No information found        Other: Other (private auto)    Discharge Codes: 01  Discharge to home

## 2018-01-16 ENCOUNTER — TELEPHONE (OUTPATIENT)
Dept: MAMMOGRAPHY | Facility: CLINIC | Age: 63
End: 2018-01-16

## 2018-01-16 NOTE — TELEPHONE ENCOUNTER
Pt called in with totals for drainage tube.     1/11/18 = 90  1/12/18 = 62  1/13/18 = 65  1/14/18 = 61  1/15/18 = 42    Advised pt to call back on Friday with more totals. Explained that we were looking for less than 30 for 3 consecutive days and then we could schedule for removal. Pt voiced understanding and states she will call back on Friday.

## 2018-01-22 ENCOUNTER — TELEPHONE (OUTPATIENT)
Dept: MAMMOGRAPHY | Facility: CLINIC | Age: 63
End: 2018-01-22

## 2018-01-22 NOTE — TELEPHONE ENCOUNTER
Pt called with drainage amounts    01/19 29 ml  01/20 25 ml  01/21 22 ml    Appt made for tomorrow at 10:30 for ARMOND drain removal.    Danii Mcknight RN

## 2018-01-23 ENCOUNTER — OFFICE VISIT (OUTPATIENT)
Dept: MAMMOGRAPHY | Facility: CLINIC | Age: 63
End: 2018-01-23

## 2018-01-23 VITALS
DIASTOLIC BLOOD PRESSURE: 82 MMHG | OXYGEN SATURATION: 95 % | HEART RATE: 91 BPM | TEMPERATURE: 97.9 F | SYSTOLIC BLOOD PRESSURE: 145 MMHG

## 2018-01-23 DIAGNOSIS — Z17.0 MALIGNANT NEOPLASM OF OVERLAPPING SITES OF RIGHT BREAST IN FEMALE, ESTROGEN RECEPTOR POSITIVE (HCC): Primary | ICD-10-CM

## 2018-01-23 DIAGNOSIS — C50.811 MALIGNANT NEOPLASM OF OVERLAPPING SITES OF RIGHT BREAST IN FEMALE, ESTROGEN RECEPTOR POSITIVE (HCC): Primary | ICD-10-CM

## 2018-01-23 PROCEDURE — 99024 POSTOP FOLLOW-UP VISIT: CPT | Performed by: SURGERY

## 2018-01-23 NOTE — PROGRESS NOTES
Chief Complaint: Adele Ly is a  62 y.o. female, initially referred by No ref. provider found , who is here today for a postoperative visit.    History of Present Illness:  In the interim,Adele Ly has had the following procedure and resultant pathology report: She is undergone breast conserving surgery.  The pathology report reveals the right mastectomy specimen to have grade 2 invasive ductal cancer.  DCIS was less than a half a millimeter from the deep and superior margin but again we did have a clear margin.  The drain has been in place and the output is now below 30 cc for 24 hours.    She has noted no redness, warmth,drainage, swelling at the incision site. Denies fever or chills.      Current Outpatient Prescriptions:   •  acyclovir (ZOVIRAX) 400 MG tablet, Take 1 tablet by mouth Daily., Disp: 30 tablet, Rfl: 4  •  amLODIPine (NORVASC) 10 MG tablet, Take 1 tablet by mouth Daily., Disp: 30 tablet, Rfl: 0  •  Apremilast (OTEZLA PO), Take 1 tablet/day by mouth 2 (Two) Times a Day., Disp: , Rfl:   •  cetirizine (ZYRTEC ALLERGY) 10 MG tablet, Take 10 mg by mouth Daily As Needed., Disp: , Rfl:   •  ibuprofen (ADVIL,MOTRIN) 200 MG tablet, Take 200 mg by mouth Every 6 (Six) Hours As Needed. PT HOLDING FOR SURGERY, Disp: , Rfl:   •  metoprolol succinate XL (TOPROL-XL) 25 MG 24 hr tablet, Take 1 tablet by mouth Daily., Disp: 30 tablet, Rfl: 0  •  omeprazole (priLOSEC) 40 MG capsule, Take 1 capsule by mouth Every Morning., Disp: 30 capsule, Rfl: 0  •  oxyCODONE-acetaminophen (PERCOCET) 5-325 MG per tablet, Take 1-2 tablets by mouth Every 4 (Four) Hours As Needed (Pain)., Disp: 30 tablet, Rfl: 0  Physical examination  Right chest wall-status post mastectomy.  The incision itself looks okay and all the Steri-Strips were removed.  I do not see any undrained fluid collections.  The drain is in place and was removed today without difficulty.    Assessment:  Right breast cancer status post mastectomy without  reconstruction.  The drain was removed today without difficulty.    Plan:  I have demonstrated to her how to do the wall walking exercises.  I would like to see her back in the office in 1 week to follow-up on her arm mobility and to be certain she is not developing a seroma.  I will also make arrangements for her to see the medical oncologists.          EMR Dragon/transcription disclaimer:    Much of this encounter note is an electronic transcription/translocation of spoken language to printed text.  The electronic translation of spoken language may permit erroneous, or at times, nonsensical words or phrases to be inadvertently transcribed.  Although I have reviewed the note from such areas, some may still exist.

## 2018-01-23 NOTE — TELEPHONE ENCOUNTER
Pt called back today with more drainage totals.    01/17 = 35  01/18 = 30  01/19 = 21 (morning only, not total for day)    Advised that we were on the right track and to call back on Monday for the weekend totals. Pt voiced understanding.

## 2018-01-29 ENCOUNTER — APPOINTMENT (OUTPATIENT)
Dept: LAB | Facility: HOSPITAL | Age: 63
End: 2018-01-29

## 2018-01-29 ENCOUNTER — APPOINTMENT (OUTPATIENT)
Dept: ONCOLOGY | Facility: CLINIC | Age: 63
End: 2018-01-29

## 2018-01-30 ENCOUNTER — OFFICE VISIT (OUTPATIENT)
Dept: MAMMOGRAPHY | Facility: CLINIC | Age: 63
End: 2018-01-30

## 2018-01-30 VITALS
DIASTOLIC BLOOD PRESSURE: 80 MMHG | RESPIRATION RATE: 16 BRPM | SYSTOLIC BLOOD PRESSURE: 122 MMHG | TEMPERATURE: 96.8 F | HEART RATE: 81 BPM | OXYGEN SATURATION: 98 %

## 2018-01-30 DIAGNOSIS — C50.811 MALIGNANT NEOPLASM OF OVERLAPPING SITES OF RIGHT BREAST IN FEMALE, ESTROGEN RECEPTOR POSITIVE (HCC): Primary | ICD-10-CM

## 2018-01-30 DIAGNOSIS — Z17.0 MALIGNANT NEOPLASM OF OVERLAPPING SITES OF RIGHT BREAST IN FEMALE, ESTROGEN RECEPTOR POSITIVE (HCC): Primary | ICD-10-CM

## 2018-01-30 PROCEDURE — 99024 POSTOP FOLLOW-UP VISIT: CPT | Performed by: SURGERY

## 2018-01-30 NOTE — PROGRESS NOTES
Chief Complaint: Adele Ly is a  62 y.o. female, initially referred by No ref. provider found , who is here today for a postoperative visit.    History of Present Illness:  In the interim,Adele Ly has been doing the wall walking exercises.  She complains of significant tightness when she does these exercises but otherwise is making slow progress.    She has noted no redness, warmth,drainage, swelling at the incision site. Denies fever or chills.      Current Outpatient Prescriptions:   •  acyclovir (ZOVIRAX) 400 MG tablet, Take 1 tablet by mouth Daily., Disp: 30 tablet, Rfl: 4  •  amLODIPine (NORVASC) 10 MG tablet, Take 1 tablet by mouth Daily., Disp: 30 tablet, Rfl: 0  •  Apremilast (OTEZLA PO), Take 1 tablet/day by mouth 2 (Two) Times a Day., Disp: , Rfl:   •  cetirizine (ZYRTEC ALLERGY) 10 MG tablet, Take 10 mg by mouth Daily As Needed., Disp: , Rfl:   •  ibuprofen (ADVIL,MOTRIN) 200 MG tablet, Take 200 mg by mouth Every 6 (Six) Hours As Needed. PT HOLDING FOR SURGERY, Disp: , Rfl:   •  metoprolol succinate XL (TOPROL-XL) 25 MG 24 hr tablet, Take 1 tablet by mouth Daily., Disp: 30 tablet, Rfl: 0  •  omeprazole (priLOSEC) 40 MG capsule, Take 1 capsule by mouth Every Morning., Disp: 30 capsule, Rfl: 0  •  oxyCODONE-acetaminophen (PERCOCET) 5-325 MG per tablet, Take 1-2 tablets by mouth Every 4 (Four) Hours As Needed (Pain)., Disp: 30 tablet, Rfl: 0  Physical examination  Right chest wall-the incision looks just fine today.  The midportion which had looked a little concerning on her last visit is going to do just fine.  I do not detect any evidence of a seroma.  She is able to get her arm 3/4 of the way to the top  Assessment:  Right breast cancer status post mastectomy without reconstruction-she still has a ways to go to get her full arm mobility back.  I gave her prescription today for breast prosthesis.  She should build to return to work in about 2 weeks and I will also see her back in 2-3  weeks.    Plan:  Office visit in 2-3 weeks.          EMR Dragon/transcription disclaimer:    Much of this encounter note is an electronic transcription/translocation of spoken language to printed text.  The electronic translation of spoken language may permit erroneous, or at times, nonsensical words or phrases to be inadvertently transcribed.  Although I have reviewed the note from such areas, some may still exist.

## 2018-02-01 ENCOUNTER — LAB (OUTPATIENT)
Dept: LAB | Facility: HOSPITAL | Age: 63
End: 2018-02-01

## 2018-02-01 ENCOUNTER — APPOINTMENT (OUTPATIENT)
Dept: ONCOLOGY | Facility: CLINIC | Age: 63
End: 2018-02-01

## 2018-02-01 ENCOUNTER — CONSULT (OUTPATIENT)
Dept: ONCOLOGY | Facility: CLINIC | Age: 63
End: 2018-02-01

## 2018-02-01 VITALS
HEIGHT: 67 IN | WEIGHT: 172.2 LBS | TEMPERATURE: 98.6 F | DIASTOLIC BLOOD PRESSURE: 90 MMHG | HEART RATE: 82 BPM | RESPIRATION RATE: 16 BRPM | BODY MASS INDEX: 27.03 KG/M2 | SYSTOLIC BLOOD PRESSURE: 142 MMHG

## 2018-02-01 DIAGNOSIS — Z17.0 MALIGNANT NEOPLASM OF OVERLAPPING SITES OF RIGHT BREAST IN FEMALE, ESTROGEN RECEPTOR POSITIVE (HCC): Primary | ICD-10-CM

## 2018-02-01 DIAGNOSIS — Z17.0 MALIGNANT NEOPLASM OF BREAST IN FEMALE, ESTROGEN RECEPTOR POSITIVE, UNSPECIFIED LATERALITY, UNSPECIFIED SITE OF BREAST (HCC): Primary | ICD-10-CM

## 2018-02-01 DIAGNOSIS — C50.811 MALIGNANT NEOPLASM OF OVERLAPPING SITES OF RIGHT BREAST IN FEMALE, ESTROGEN RECEPTOR POSITIVE (HCC): Primary | ICD-10-CM

## 2018-02-01 DIAGNOSIS — C50.919 MALIGNANT NEOPLASM OF BREAST IN FEMALE, ESTROGEN RECEPTOR POSITIVE, UNSPECIFIED LATERALITY, UNSPECIFIED SITE OF BREAST (HCC): Primary | ICD-10-CM

## 2018-02-01 LAB
BASOPHILS # BLD AUTO: 0.07 10*3/MM3 (ref 0–0.1)
BASOPHILS NFR BLD AUTO: 0.7 % (ref 0–1.1)
DEPRECATED RDW RBC AUTO: 41.4 FL (ref 37–49)
EOSINOPHIL # BLD AUTO: 0.25 10*3/MM3 (ref 0–0.36)
EOSINOPHIL NFR BLD AUTO: 2.5 % (ref 1–5)
ERYTHROCYTE [DISTWIDTH] IN BLOOD BY AUTOMATED COUNT: 12.6 % (ref 11.7–14.5)
HCT VFR BLD AUTO: 43.2 % (ref 34–45)
HGB BLD-MCNC: 15 G/DL (ref 11.5–14.9)
IMM GRANULOCYTES # BLD: 0.05 10*3/MM3 (ref 0–0.03)
IMM GRANULOCYTES NFR BLD: 0.5 % (ref 0–0.5)
LYMPHOCYTES # BLD AUTO: 2.88 10*3/MM3 (ref 1–3.5)
LYMPHOCYTES NFR BLD AUTO: 28.9 % (ref 20–49)
MCH RBC QN AUTO: 31.1 PG (ref 27–33)
MCHC RBC AUTO-ENTMCNC: 34.7 G/DL (ref 32–35)
MCV RBC AUTO: 89.4 FL (ref 83–97)
MONOCYTES # BLD AUTO: 0.68 10*3/MM3 (ref 0.25–0.8)
MONOCYTES NFR BLD AUTO: 6.8 % (ref 4–12)
NEUTROPHILS # BLD AUTO: 6.02 10*3/MM3 (ref 1.5–7)
NEUTROPHILS NFR BLD AUTO: 60.6 % (ref 39–75)
NRBC BLD MANUAL-RTO: 0 /100 WBC (ref 0–0)
PLATELET # BLD AUTO: 276 10*3/MM3 (ref 150–375)
PMV BLD AUTO: 9 FL (ref 8.9–12.1)
RBC # BLD AUTO: 4.83 10*6/MM3 (ref 3.9–5)
WBC NRBC COR # BLD: 9.95 10*3/MM3 (ref 4–10)

## 2018-02-01 PROCEDURE — 36416 COLLJ CAPILLARY BLOOD SPEC: CPT | Performed by: INTERNAL MEDICINE

## 2018-02-01 PROCEDURE — 99245 OFF/OP CONSLTJ NEW/EST HI 55: CPT | Performed by: INTERNAL MEDICINE

## 2018-02-01 PROCEDURE — 85025 COMPLETE CBC W/AUTO DIFF WBC: CPT | Performed by: INTERNAL MEDICINE

## 2018-02-01 RX ORDER — ANASTROZOLE 1 MG/1
1 TABLET ORAL DAILY
Qty: 30 TABLET | Refills: 3 | Status: SHIPPED | OUTPATIENT
Start: 2018-02-01 | End: 2018-06-09 | Stop reason: SDUPTHER

## 2018-02-01 NOTE — PROGRESS NOTES
Subjective     REASON FOR CONSULTATION:  1. T1c N0 stage IA invasive ductal carcinoma of the right breast, status post right mastectomy with sentinel lymph node biopsy.  Grade 2 tumor with Lodi score of 6 out of 9, ER positive greater than 90%, ID 90%, HER-2/reuben 3+ by minimal histochemistry.  Patient has DCIS.    2. Patient refuses chemotherapy with Taxol/Herceptin, and is agreeable only for hormonal treatment with aromatase inhibitor.  Provide an opinion on any further workup or treatment                             REQUESTING PHYSICIAN:  Dr. Juan Peterson    RECORDS OBTAINED:  Records of the patients history including those obtained from the referring provider were reviewed and summarized in detail.    HISTORY OF PRESENT ILLNESS:  The patient is a 62 y.o. year old female who is here for an opinion about the above issue.    History of Present Illness patient is a 62-year-old female who has been seen by Dr. Peterson recently on January 10, 2018 for a newly diagnosed breast cancer area patient had mammogram last year which showed large area of calcifications in the upper-outer quadrant of the right breast measuring 12×6 cm in addition there was a separate area of calcifications about 4 cm away.  At that time and apparently she did not pursue evaluation of this.  So she came back for imaging this year.    Subsequently she had a repeat mammogram October 2017 which showed in the posterior one third of the upper outer quadrant of the right breast there is an area of focal asymmetry seen in association with microcalcifications.  There are also multiple microcalcifications seen in the posterior one third lateral aspect of the right breast at 9 o'clock position.  There was no areas of architectural distortion in either breast.  There is no evidence of skin thickening or nipple retraction.  Patient then had a diagnostic mammogram on the right side which showed that there were 2 separate clusters of suspicious  calcifications seen in the right breast in the upper outer quadrant.  One in the posterior one third near 10 o'clock position and another in the posterior one third near 9 o'clock position.    Ultrasound-guided biopsy was done on November 24, 2017.  The right breast biopsy at 10:30 position showed invasive mammary carcinoma ductal type.  It is grade 2 with a Dermott score of 6 out of 9.  Maximum dimension of invasive carcinoma is 9 mm.  Associated ductal carcinoma in situ is present.  It is high-grade with comedonecrosis.  The biopsy of the right breast at 9 o'clock position showed ductal carcinoma in situ, high nuclear grade with comedonecrosis.  No definite invasion identified.  Maximum span of DCIS core was 5 mm.  Estrogen receptors on the invasive carcinoma was greater than 90%, progesterone receptor was 90% and HER-2/reuben was 3+ by minimal histochemistry.    Patient saw Dr. Peterson and a right total mastectomy was done on January 10, 2018.  The tumor size was 16 mm invasive ductal carcinoma.  It had a Claudy score of 6 out of 9, grade 2, single focus of invasive carcinoma.  Ductal carcinoma in situ is present at the site of invasive carcinoma as well as in sections obtained inferior and lateral to the site of prior biopsy as well as in the sections from upper outer and lower quadrants.  2 sentinel lymph nodes were removed and they were both negative.  So it's a T1c N0 invasive ductal carcinoma of the right breast.    The margins were uninvolved by invasive carcinoma and distance from the closest margin is 3 mm from the deep margin.  Margins were uninvolved uninvolved by DCIS.  Distance from closest margin DCIS is less than 0.5 mm from deep margin and DCIS is less than 0.5 mm from the superficial margin.    Patient is here to discuss treatment options.          Past Medical History:   Diagnosis Date   • Arthritis    • Breast cancer    • Colon polyp    • Emphysema of lung    • Genital herpes    • History  of diverticulitis    • History of gastric ulcer    • Hypertension    • Infectious viral hepatitis     B        Past Surgical History:   Procedure Laterality Date   • BREAST BIOPSY Right 2017    malignant   • COLON RESECTION     • COLOSTOMY     • COLOSTOMY REVISION     • MASTECTOMY WITH SENTINEL NODE BIOPSY AND AXILLARY NODE DISSECTION Right 1/10/2018    Procedure: BREAST MASTECTOMY WITH SENTINEL NODE BIOPSY;  Surgeon: Juan Peterson MD;  Location: [facility];  Service:    • PELVIC LAPAROSCOPY     • TONSILLECTOMY     • WISDOM TOOTH EXTRACTION          Current Outpatient Prescriptions on File Prior to Visit   Medication Sig Dispense Refill   • acyclovir (ZOVIRAX) 400 MG tablet Take 1 tablet by mouth Daily. 30 tablet 4   • amLODIPine (NORVASC) 10 MG tablet Take 1 tablet by mouth Daily. 30 tablet 0   • Apremilast (OTEZLA PO) Take 1 tablet/day by mouth 2 (Two) Times a Day.     • cetirizine (ZYRTEC ALLERGY) 10 MG tablet Take 10 mg by mouth Daily As Needed.     • ibuprofen (ADVIL,MOTRIN) 200 MG tablet Take 200 mg by mouth Every 6 (Six) Hours As Needed. PT HOLDING FOR SURGERY     • metoprolol succinate XL (TOPROL-XL) 25 MG 24 hr tablet Take 1 tablet by mouth Daily. 30 tablet 0   • omeprazole (priLOSEC) 40 MG capsule Take 1 capsule by mouth Every Morning. 30 capsule 0   • oxyCODONE-acetaminophen (PERCOCET) 5-325 MG per tablet Take 1-2 tablets by mouth Every 4 (Four) Hours As Needed (Pain). 30 tablet 0     No current facility-administered medications on file prior to visit.         ALLERGIES:    Allergies   Allergen Reactions   • Penicillins Swelling     THROAT SWELLS        Social History     Social History   • Marital status:      Spouse name: N/A   • Number of children: N/A   • Years of education: N/A     Occupational History   •  [employer]     Social History Main Topics   • Smoking status: Current Every Day Smoker     Packs/day: 1.00     Types: Cigarettes     Start date: 1975   • Smokeless tobacco:  Never Used   • Alcohol use Yes      Comment: COUPLE DAYS A WEEK , BEER   • Drug use: No   • Sexual activity: Defer      Comment: single     Other Topics Concern   • Not on file     Social History Narrative        Family History   Problem Relation Age of Onset   • Diabetes Father    • Hypertension Father    • No Known Problems Mother    • No Known Problems Sister    • No Known Problems Brother    • No Known Problems Daughter    • No Known Problems Son    • Breast cancer Maternal Grandmother 60   • No Known Problems Paternal Grandmother    • Breast cancer Maternal Aunt 60   • Diabetes Paternal Aunt    • Ovarian cancer Paternal Aunt 41   • Colon cancer Maternal Uncle    • COPD Paternal Uncle    • BRCA 1/2 Neg Hx    • Endometrial cancer Neg Hx    • Malig Hyperthermia Neg Hx         Review of Systems   Constitutional: Negative for fatigue and unexpected weight change.   Respiratory: Negative for chest tightness, shortness of breath and wheezing.    Cardiovascular: Negative for chest pain, palpitations and leg swelling.   Gastrointestinal: Negative for abdominal pain, blood in stool, nausea and vomiting.   All other systems reviewed and are negative.       Objective     There were no vitals filed for this visit.  No flowsheet data found.    Physical Exam    GENERAL:  Well-developed, well-nourished in no acute distress.   SKIN:  Warm, dry without rashes, purpura or petechiae.  EYES:  Pupils equal, round and reactive to light.  EOMs intact.  Conjunctivae normal.  EARS:  Hearing intact.  NOSE:  Septum midline.  No excoriations or nasal discharge.  MOUTH:  Tongue is well-papillated; no stomatitis or ulcers.  Lips normal.  THROAT:  Oropharynx without lesions or exudates.  NECK:  Supple with good range of motion; no thyromegaly or masses, no JVD.  LYMPHATICS:  No cervical, supraclavicular, axillary or inguinal adenopathy.  CHEST:  Lungs clear to auscultation. Good airflow.  BREASTS: Left breast: No evidence of any breast  masses, no skin changes, no nipple retraction, no left axillary adenopathy, no left supraclavicular adenopathy.  Status post right mastectomy with sentinel lymph node, surgical site appears clean there is no evidence of any infection.  No evidence of right axillary adenopathy and no right supraglottic adenopathy.  CARDIAC:  Regular rate and rhythm without murmurs, rubs or gallops. Normal S1,S2.  ABDOMEN:  Soft, nontender with no hepatosplenomegaly or masses.  EXTREMITIES:  No clubbing, cyanosis or edema.  NEUROLOGICAL:  Cranial Nerves II-XII grossly intact.  No focal neurological deficits.  PSYCHIATRIC:  Normal affect and mood.        RECENT LABS:  Hematology WBC   Date Value Ref Range Status   01/04/2018 9.14 4.50 - 10.70 10*3/mm3 Final     RBC   Date Value Ref Range Status   01/04/2018 4.44 3.90 - 5.20 10*6/mm3 Final     Hemoglobin   Date Value Ref Range Status   01/04/2018 13.8 11.9 - 15.5 g/dL Final     Hematocrit   Date Value Ref Range Status   01/04/2018 41.3 35.6 - 45.5 % Final     Platelets   Date Value Ref Range Status   01/04/2018 268 140 - 500 10*3/mm3 Final          Assessment/Plan      1. T1c N0 stage IA invasive ductal carcinoma of the right breast, status post right mastectomy with sentinel lymph node biopsy.  Grade 2 tumor with Spade score of 6 out of 9, ER positive greater than 90%, VT 90%, HER-2/reuben 3+ by minimal histochemistry.  Patient has DCIS.  Patient has healed from the surgery very well.  I have reviewed the pathology in detail.  I discussed in length with the patient.  Her initial mammogram was January 2016.  She had 2 areas of calcifications in the upper outer quadrant of the right breast which are  by 4 cm but patient did not go for any biopsy.  Subsequently she had screening mammogram done in October 2017 following which she underwent a diagnostic mammogram and biopsy.  Biopsy in the upper outer quadrant at the 10:30 position showed evidence of invasive ductal carcinoma  along with ductal carcinoma in situ.  The second biopsy done at 9 o'clock position showed only ductal carcinoma in situ.  Patient's final pathology at mastectomy showed that it was a 1.6 cm mass, grade 2 invasive ductal carcinoma, Sandy score of 6 out of 9 with the ER/OK positive and HER-2/reuben 3+ positive.  I had a lengthy discussion with the patient that it's important to consider chemotherapy with weekly Taxol ×12 weeks along with Herceptin for 1 year.  Patient refuses any chemotherapy.  She understands the risks of the cancer coming back to the visceral organs or to the brain.  I told her that patients can recur within the first 5 years with a HER-2 positive tumors though distant recurrences can also occur given her ER/OK positivity.    I explained to her that the side effects of chemotherapy are not significant and most of them up very tolerable and decreases the chance of recurrence significantly.  She refuses to do so I will also present this case in the breast cancer conference to discuss with the pathologist about the margins.  However patient does not want any radiation treatments.    Ultimately we discussed about hormonal treatment with aromatase inhibitors and explained to her in length the side effects of Arimidex and tamoxifen.  We recommend postmenopausal be discussed about starting Arimidex.    2.  Significant smoking, she is trying to quit.    3.  Family history of breast cancer with her aunt having had breast cancer and grandmother with breast cancer on her mother's side.  Will likely require genetic counseling but will need to discuss it at her next appointment.  She does not have children and I'm not sure she will be very keen to get tested.  But she does have 2 sisters and it might benefit them if we do the genetic testing.    Plan 1.  Start Arimidex 1 mg daily as patient refuses any kind of chemotherapy.    2.  I will plan to bring her in the last week of March to assess toxicity.  She does  have hot flashes and we told her that we could always consider the effects are if her hot flashes gets worse.    3.  She will require DEXA scan but at the present time she does not want it since she is too busy at work.  We will need to do it at a later date.  Her last DEXA scan in 2013 has been normal.    Thank you for allowing me to participate in the care of this patient.    Pooja Monacda MD

## 2018-02-05 ENCOUNTER — TELEPHONE (OUTPATIENT)
Dept: ONCOLOGY | Facility: HOSPITAL | Age: 63
End: 2018-02-05

## 2018-02-05 RX ORDER — VENLAFAXINE 75 MG/1
75 TABLET ORAL DAILY
Qty: 30 TABLET | Refills: 6 | Status: SHIPPED | OUTPATIENT
Start: 2018-02-05 | End: 2019-01-23

## 2018-02-05 NOTE — TELEPHONE ENCOUNTER
Patient calling for antidepressant. Stated she spoke with her about this at her appointment but did not want at that time. Gave message to Dr. Moncada and she will call in a script. Patient verbalized understanding.

## 2018-02-06 ENCOUNTER — DOCUMENTATION (OUTPATIENT)
Dept: ONCOLOGY | Facility: CLINIC | Age: 63
End: 2018-02-06

## 2018-02-06 RX ORDER — AMLODIPINE BESYLATE 10 MG/1
TABLET ORAL
Qty: 90 TABLET | Refills: 0 | Status: SHIPPED | OUTPATIENT
Start: 2018-02-06 | End: 2018-05-10 | Stop reason: SDUPTHER

## 2018-02-06 NOTE — PROGRESS NOTES
KATELIN met with the patient on 2/1/18, prior to her consultation with Dr. Moncada about her breast cancer. She said she has been told this is early breast cancer. She had a mastectomy on 1-10-18. She has been taking hormone medication, since she has significant hot flashes and problems sleeping.     Pt is single, with no children. She lives in her own home, and has a few friends at work. She said her sisters and brother have helped her during this time, even though some of her siblings have their own health problems. She has reluctance about taking medications. She worries that hormone blockers will bring on hot flashes again, as they were so disruptive. She is self-conscious about having had one breast removed. She feels she is not attractive now. She is getting fitted for a prosthetic bra today. She has some concern about going to work and talking with co-workers about her cancer. She works at [employer], shipping eye glasses. She said she returns to work in 2 weeks. She has to do some heavy lifting there. She has worked for them for 3 years.     Pt would like to have reconstruction done, but has been told that she will need to quit smoking first. She had a strong lingering cigarette odor when here today.     KATELIN encouraged her to keep an open mind when talking with Dr. Moncada today, and to consider what Dr. Moncada may recommend. KATELIN services were explained and services offered as needed in the future.

## 2018-02-06 NOTE — TELEPHONE ENCOUNTER
DID AS PT REQUESTED.   ----- Message from Lena Cooley sent at 2/6/2018 11:45 AM EST -----  Contact: PT  PT HAS APTS 2/12 LAB 2/16 MD- JUST HAD A MASTECTOMY- IS OUT OF   AMLODIPINE 10 MG- WONDERS IF SHE CAN GET HER 90 DAY  NOW SINCE WILL BE IN SOON FOR APT    SIXTO ARAIZA

## 2018-02-09 DIAGNOSIS — I10 ESSENTIAL HYPERTENSION: Primary | ICD-10-CM

## 2018-02-09 DIAGNOSIS — Z11.59 NEED FOR HEPATITIS C SCREENING TEST: ICD-10-CM

## 2018-02-13 ENCOUNTER — OFFICE VISIT (OUTPATIENT)
Dept: MAMMOGRAPHY | Facility: CLINIC | Age: 63
End: 2018-02-13

## 2018-02-13 VITALS
SYSTOLIC BLOOD PRESSURE: 138 MMHG | OXYGEN SATURATION: 98 % | HEART RATE: 80 BPM | DIASTOLIC BLOOD PRESSURE: 88 MMHG | TEMPERATURE: 97.6 F | RESPIRATION RATE: 16 BRPM

## 2018-02-13 DIAGNOSIS — Z17.0 MALIGNANT NEOPLASM OF OVERLAPPING SITES OF RIGHT BREAST IN FEMALE, ESTROGEN RECEPTOR POSITIVE (HCC): Primary | ICD-10-CM

## 2018-02-13 DIAGNOSIS — C50.811 MALIGNANT NEOPLASM OF OVERLAPPING SITES OF RIGHT BREAST IN FEMALE, ESTROGEN RECEPTOR POSITIVE (HCC): Primary | ICD-10-CM

## 2018-02-13 PROCEDURE — 99024 POSTOP FOLLOW-UP VISIT: CPT | Performed by: SURGERY

## 2018-02-13 NOTE — PROGRESS NOTES
Chief Complaint: Adele Ly is a  62 y.o. female, initially referred by No ref. provider found , who is here today for a postoperative visit.    History of Present Illness:  In the interim,Adele Ly has been doing the wall walking exercises.  She feels that she has made good progress and would like to go back to work tomorrow.    She has noted no redness, warmth,drainage, swelling at the incision site. Denies fever or chills.      Current Outpatient Prescriptions:   •  acyclovir (ZOVIRAX) 400 MG tablet, Take 1 tablet by mouth Daily., Disp: 30 tablet, Rfl: 4  •  amLODIPine (NORVASC) 10 MG tablet, TAKE ONE TABLET BY MOUTH DAILY, Disp: 90 tablet, Rfl: 0  •  anastrozole (ARIMIDEX) 1 MG tablet, Take 1 tablet by mouth Daily for 30 days., Disp: 30 tablet, Rfl: 3  •  Apremilast (OTEZLA PO), Take 1 tablet/day by mouth 2 (Two) Times a Day., Disp: , Rfl:   •  cetirizine (ZYRTEC ALLERGY) 10 MG tablet, Take 10 mg by mouth Daily As Needed., Disp: , Rfl:   •  ibuprofen (ADVIL,MOTRIN) 200 MG tablet, Take 200 mg by mouth Every 6 (Six) Hours As Needed. PT HOLDING FOR SURGERY, Disp: , Rfl:   •  metoprolol succinate XL (TOPROL-XL) 25 MG 24 hr tablet, Take 1 tablet by mouth Daily., Disp: 30 tablet, Rfl: 0  •  omeprazole (priLOSEC) 40 MG capsule, Take 1 capsule by mouth Every Morning., Disp: 30 capsule, Rfl: 0  •  venlafaxine (EFFEXOR) 75 MG tablet, Take 1 tablet by mouth Daily., Disp: 30 tablet, Rfl: 6  Physical examination  Right chest wall-the incision is coming along nicely without any signs of necrosis, drainage, or infection.  I do not detect any seroma formation.  She is able to get her right arm fully above her head now.  Assessment:  Right breast cancer status post mastectomy without reconstruction.  She has obtained her breast prosthesis and seems to be happy with it.  She has seen the medical oncologists who've recommended chemotherapy but the patient declined.  She is taking hormone blocking therapy and plans to  follow with the medical oncologists.    Plan:  I will see her on an as-needed basis.          EMR Dragon/transcription disclaimer:    Much of this encounter note is an electronic transcription/translocation of spoken language to printed text.  The electronic translation of spoken language may permit erroneous, or at times, nonsensical words or phrases to be inadvertently transcribed.  Although I have reviewed the note from such areas, some may still exist.

## 2018-02-16 ENCOUNTER — OFFICE VISIT (OUTPATIENT)
Dept: FAMILY MEDICINE CLINIC | Facility: CLINIC | Age: 63
End: 2018-02-16

## 2018-02-16 VITALS
WEIGHT: 170.2 LBS | HEIGHT: 67 IN | OXYGEN SATURATION: 98 % | DIASTOLIC BLOOD PRESSURE: 88 MMHG | HEART RATE: 77 BPM | BODY MASS INDEX: 26.71 KG/M2 | TEMPERATURE: 98 F | SYSTOLIC BLOOD PRESSURE: 144 MMHG | RESPIRATION RATE: 16 BRPM

## 2018-02-16 DIAGNOSIS — J43.9 PULMONARY EMPHYSEMA, UNSPECIFIED EMPHYSEMA TYPE (HCC): ICD-10-CM

## 2018-02-16 DIAGNOSIS — I10 ESSENTIAL HYPERTENSION: Primary | ICD-10-CM

## 2018-02-16 PROCEDURE — 99213 OFFICE O/P EST LOW 20 MIN: CPT | Performed by: INTERNAL MEDICINE

## 2018-02-16 RX ORDER — IRBESARTAN AND HYDROCHLOROTHIAZIDE 150; 12.5 MG/1; MG/1
1 TABLET, FILM COATED ORAL DAILY
Qty: 30 TABLET | Refills: 11 | Status: SHIPPED | OUTPATIENT
Start: 2018-02-16 | End: 2018-03-15

## 2018-02-16 RX ORDER — SULFAMETHOXAZOLE AND TRIMETHOPRIM 800; 160 MG/1; MG/1
1 TABLET ORAL 2 TIMES DAILY
Qty: 20 TABLET | Refills: 0 | Status: SHIPPED | OUTPATIENT
Start: 2018-02-16 | End: 2018-03-15

## 2018-02-18 LAB
ALBUMIN SERPL-MCNC: 4.4 G/DL (ref 3.5–5.2)
ALBUMIN/GLOB SERPL: 1.6 G/DL
ALP SERPL-CCNC: 76 U/L (ref 39–117)
ALT SERPL-CCNC: 5 U/L (ref 1–33)
AST SERPL-CCNC: 13 U/L (ref 1–32)
BASOPHILS # BLD AUTO: 0.03 10*3/MM3 (ref 0–0.2)
BASOPHILS NFR BLD AUTO: 0.4 % (ref 0–1.5)
BILIRUB SERPL-MCNC: <0.2 MG/DL (ref 0.1–1.2)
BUN SERPL-MCNC: 12 MG/DL (ref 8–23)
BUN/CREAT SERPL: 16 (ref 7–25)
CALCIUM SERPL-MCNC: 9.6 MG/DL (ref 8.6–10.5)
CHLORIDE SERPL-SCNC: 98 MMOL/L (ref 98–107)
CO2 SERPL-SCNC: 27.2 MMOL/L (ref 22–29)
CREAT SERPL-MCNC: 0.75 MG/DL (ref 0.57–1)
EOSINOPHIL # BLD AUTO: 0.13 10*3/MM3 (ref 0–0.7)
EOSINOPHIL NFR BLD AUTO: 1.9 % (ref 0.3–6.2)
ERYTHROCYTE [DISTWIDTH] IN BLOOD BY AUTOMATED COUNT: 13 % (ref 11.7–13)
GFR SERPLBLD CREATININE-BSD FMLA CKD-EPI: 79 ML/MIN/1.73
GFR SERPLBLD CREATININE-BSD FMLA CKD-EPI: 96 ML/MIN/1.73
GLOBULIN SER CALC-MCNC: 2.7 GM/DL
GLUCOSE SERPL-MCNC: 106 MG/DL (ref 65–99)
HCT VFR BLD AUTO: 41.6 % (ref 35.6–45.5)
HCV AB PATRN SER IB-IMP: NORMAL
HCV AB S/CO SERPL IA: >11 S/CO RATIO (ref 0–0.9)
HCV RNA SERPL QL NAA+PROBE: NEGATIVE
HGB BLD-MCNC: 13.9 G/DL (ref 11.9–15.5)
IMM GRANULOCYTES # BLD: 0.02 10*3/MM3 (ref 0–0.03)
IMM GRANULOCYTES NFR BLD: 0.3 % (ref 0–0.5)
LYMPHOCYTES # BLD AUTO: 2.03 10*3/MM3 (ref 0.9–4.8)
LYMPHOCYTES NFR BLD AUTO: 30.3 % (ref 19.6–45.3)
MCH RBC QN AUTO: 31 PG (ref 26.9–32)
MCHC RBC AUTO-ENTMCNC: 33.4 G/DL (ref 32.4–36.3)
MCV RBC AUTO: 92.9 FL (ref 80.5–98.2)
MONOCYTES # BLD AUTO: 0.46 10*3/MM3 (ref 0.2–1.2)
MONOCYTES NFR BLD AUTO: 6.9 % (ref 5–12)
NEUTROPHILS # BLD AUTO: 4.02 10*3/MM3 (ref 1.9–8.1)
NEUTROPHILS NFR BLD AUTO: 60.2 % (ref 42.7–76)
PLATELET # BLD AUTO: 244 10*3/MM3 (ref 140–500)
POTASSIUM SERPL-SCNC: 3.8 MMOL/L (ref 3.5–5.2)
PROT SERPL-MCNC: 7.1 G/DL (ref 6–8.5)
RBC # BLD AUTO: 4.48 10*6/MM3 (ref 3.9–5.2)
SODIUM SERPL-SCNC: 139 MMOL/L (ref 136–145)
WBC # BLD AUTO: 6.69 10*3/MM3 (ref 4.5–10.7)

## 2018-02-19 ENCOUNTER — TELEPHONE (OUTPATIENT)
Dept: FAMILY MEDICINE CLINIC | Facility: CLINIC | Age: 63
End: 2018-02-19

## 2018-02-19 RX ORDER — OMEPRAZOLE 20 MG/1
20 CAPSULE, DELAYED RELEASE ORAL DAILY
Qty: 30 CAPSULE | Refills: 5 | Status: SHIPPED | OUTPATIENT
Start: 2018-02-19 | End: 2018-03-15

## 2018-02-19 NOTE — TELEPHONE ENCOUNTER
Pt was called and notified it was ok to take amlodipine with new b/p medication, per Dr. Cosby. Pt understood.   ----- Message from Rc Noe MA sent at 2/19/2018  4:12 PM EST -----  Contact: PT      ----- Message -----     From: Yolanda Alcala MA     Sent: 2/19/2018   9:18 AM       To: Rc Noe MA    PT NEEDS OMEPRAZOLE 40 MG QTY 30  AMLODIPINE 10 MG QTY 30      PT OS USING [facility] [address] AT [facility] - [phone]  - [phone] FX      PT WANTS TO KNOW IF SHE CAN TAKE NEW BP MEDS WITH AMLODIPINE       PT CAN BE REACHED [phone]

## 2018-03-05 DIAGNOSIS — Z90.11 STATUS POST RIGHT MASTECTOMY: Primary | ICD-10-CM

## 2018-03-15 ENCOUNTER — OFFICE VISIT (OUTPATIENT)
Dept: FAMILY MEDICINE CLINIC | Facility: CLINIC | Age: 63
End: 2018-03-15

## 2018-03-15 VITALS
SYSTOLIC BLOOD PRESSURE: 134 MMHG | WEIGHT: 168.8 LBS | TEMPERATURE: 98.2 F | DIASTOLIC BLOOD PRESSURE: 82 MMHG | RESPIRATION RATE: 16 BRPM | OXYGEN SATURATION: 98 % | HEIGHT: 67 IN | HEART RATE: 81 BPM | BODY MASS INDEX: 26.49 KG/M2

## 2018-03-15 DIAGNOSIS — I10 ESSENTIAL HYPERTENSION: Primary | ICD-10-CM

## 2018-03-15 DIAGNOSIS — F17.200 TOBACCO USE DISORDER: ICD-10-CM

## 2018-03-15 DIAGNOSIS — G43.909 MIGRAINE WITHOUT STATUS MIGRAINOSUS, NOT INTRACTABLE, UNSPECIFIED MIGRAINE TYPE: ICD-10-CM

## 2018-03-15 PROCEDURE — 99214 OFFICE O/P EST MOD 30 MIN: CPT | Performed by: INTERNAL MEDICINE

## 2018-03-15 RX ORDER — SUMATRIPTAN 50 MG/1
TABLET, FILM COATED ORAL
Qty: 9 TABLET | Refills: 2 | Status: SHIPPED | OUTPATIENT
Start: 2018-03-15

## 2018-03-15 RX ORDER — ANASTROZOLE 1 MG/1
TABLET ORAL
COMMUNITY
Start: 2018-03-10 | End: 2018-05-16 | Stop reason: SDUPTHER

## 2018-03-15 RX ORDER — IRBESARTAN AND HYDROCHLOROTHIAZIDE 300; 12.5 MG/1; MG/1
1 TABLET, FILM COATED ORAL DAILY
Qty: 30 TABLET | Refills: 11 | Status: SHIPPED | OUTPATIENT
Start: 2018-03-15 | End: 2019-01-23

## 2018-03-15 NOTE — PROGRESS NOTES
Subjective   Adele Ly is a 62 y.o. female. Patient is here today for   Chief Complaint   Patient presents with   • Blood Pressure Check     follow up b/p check from being put on new b/p pill           Vitals:    03/15/18 1555   BP: 134/82   Pulse: 81   Resp: 16   Temp: 98.2 °F (36.8 °C)   SpO2: 98%       Past Medical History:   Diagnosis Date   • Arthritis    • Breast cancer    • Colon polyp    • Emphysema of lung    • Genital herpes    • History of diverticulitis    • History of gastric ulcer    • Hypertension    • Infectious viral hepatitis     B      Allergies   Allergen Reactions   • Penicillins Swelling     THROAT SWELLS      Social History     Social History   • Marital status:      Spouse name: N/A   • Number of children: N/A   • Years of education: N/A     Occupational History   •  [employer]     Social History Main Topics   • Smoking status: Current Every Day Smoker     Packs/day: 1.00     Types: Cigarettes     Start date: 1975   • Smokeless tobacco: Current User   • Alcohol use Yes      Comment: COUPLE DAYS A WEEK , BEER   • Drug use: No   • Sexual activity: Defer      Comment: single     Other Topics Concern   • Not on file     Social History Narrative   • No narrative on file        Current Outpatient Prescriptions:   •  acyclovir (ZOVIRAX) 400 MG tablet, Take 1 tablet by mouth Daily., Disp: 30 tablet, Rfl: 4  •  amLODIPine (NORVASC) 10 MG tablet, TAKE ONE TABLET BY MOUTH DAILY, Disp: 90 tablet, Rfl: 0  •  anastrozole (ARIMIDEX) 1 MG tablet, , Disp: , Rfl:   •  Apremilast (OTEZLA PO), Take 1 tablet/day by mouth 2 (Two) Times a Day., Disp: , Rfl:   •  cetirizine (ZYRTEC ALLERGY) 10 MG tablet, Take 10 mg by mouth Daily As Needed., Disp: , Rfl:   •  ibuprofen (ADVIL,MOTRIN) 200 MG tablet, Take 200 mg by mouth Every 6 (Six) Hours As Needed. PT HOLDING FOR SURGERY, Disp: , Rfl:   •  irbesartan-hydrochlorothiazide (AVALIDE) 300-12.5 MG tablet, Take 1 tablet by mouth Daily., Disp: 30 tablet,  Rfl: 11  •  metoprolol succinate XL (TOPROL-XL) 25 MG 24 hr tablet, Take 1 tablet by mouth Daily., Disp: 30 tablet, Rfl: 0  •  omeprazole (priLOSEC) 40 MG capsule, Take 1 capsule by mouth Every Morning., Disp: 30 capsule, Rfl: 0  •  SUMAtriptan (IMITREX) 50 MG tablet, Take one tablet at onset of headache. May repeat dose one time in 2 hours if headache not relieved., Disp: 9 tablet, Rfl: 2  •  varenicline (CHANTIX TOYIN) 0.5 MG X 11 & 1 MG X 42 tablet, Take 0.5 mg one daily on days 1-3 and and 0.5 mg twice daily on days 4-7.Then 1 mg twice daily for a total of 12 weeks., Disp: 53 tablet, Rfl: 0  •  venlafaxine (EFFEXOR) 75 MG tablet, Take 1 tablet by mouth Daily., Disp: 30 tablet, Rfl: 6     Objective     She is here to follow-up on her hypertension.    She continues to smoke cigarettes.  She did not start the Chantix starter pack that I gave her at her last visit.    She asked if I had in any inhalers that she might use.  She finds them too expensive to buy.    She tells me that she has migraine headaches.  She feels that they're migraine headaches because they're associated with nausea and photophobia.  They don't happen that often.         Review of Systems   Constitutional: Negative.    HENT: Negative.    Respiratory:        She has some dyspnea on exertion.   Cardiovascular: Negative.    Psychiatric/Behavioral: Negative.        Physical Exam   Constitutional: She is oriented to person, place, and time. She appears well-developed and well-nourished.   HENT:   Head: Normocephalic and atraumatic.   Pulmonary/Chest: Effort normal.   Neurological: She is alert and oriented to person, place, and time.   Psychiatric: She has a normal mood and affect. Her behavior is normal.   Nursing note and vitals reviewed.        Problem List Items Addressed This Visit        Cardiovascular and Mediastinum    BP (high blood pressure) - Primary    Relevant Medications    irbesartan-hydrochlorothiazide (AVALIDE) 300-12.5 MG tablet     Migraine without status migrainosus, not intractable    Relevant Medications    SUMAtriptan (IMITREX) 50 MG tablet       Other    Tobacco use disorder      Other Visit Diagnoses    None.           PLAN  We spent 5 minutes discussing smoking cessation.  She does not feel that she is ready to start Chantix.  She will let me know when she is.    She has hypertension which is not optimally controlled but it is better than it was when she was here last month.  I'm going to increase her Erbie Lacie/hydrochlorothiazide to 300/12 at once daily.    She feels she has migraine headaches and she is probably right.  I sent a prescription out for Imitrex for her to use as needed.    I asked her to follow-up in about 6 months.  Week before that visit, she should have a comprehensive metabolic panel just to make sure that her renal function is good and her electrolytes are okay.    I gave her samples of Advair Diskus 250/50.  No Follow-up on file.

## 2018-03-26 ENCOUNTER — APPOINTMENT (OUTPATIENT)
Dept: LAB | Facility: HOSPITAL | Age: 63
End: 2018-03-26

## 2018-04-04 ENCOUNTER — TELEPHONE (OUTPATIENT)
Dept: OTHER | Facility: HOSPITAL | Age: 63
End: 2018-04-04

## 2018-04-04 NOTE — TELEPHONE ENCOUNTER
Adele returned my call. She states she is doing pretty good. She has returned to work and is tolerating her Arimidex with some hot flashes. She states she is taking Effexor to help with this. We discussed integrative therapies offered at [facility] and she will call if she would like to participate.

## 2018-04-04 NOTE — TELEPHONE ENCOUNTER
I tried to call Adele today to see how she's doing. I left a message asking her to please return my call.

## 2018-04-26 ENCOUNTER — OFFICE VISIT (OUTPATIENT)
Dept: FAMILY MEDICINE CLINIC | Facility: CLINIC | Age: 63
End: 2018-04-26

## 2018-04-26 VITALS
RESPIRATION RATE: 17 BRPM | OXYGEN SATURATION: 98 % | BODY MASS INDEX: 26.68 KG/M2 | WEIGHT: 166 LBS | DIASTOLIC BLOOD PRESSURE: 70 MMHG | SYSTOLIC BLOOD PRESSURE: 122 MMHG | HEIGHT: 66 IN | HEART RATE: 93 BPM

## 2018-04-26 DIAGNOSIS — I10 ESSENTIAL HYPERTENSION: Primary | ICD-10-CM

## 2018-04-26 DIAGNOSIS — F17.200 TOBACCO USE DISORDER: ICD-10-CM

## 2018-04-26 DIAGNOSIS — J43.9 PULMONARY EMPHYSEMA, UNSPECIFIED EMPHYSEMA TYPE (HCC): ICD-10-CM

## 2018-04-26 PROCEDURE — 99213 OFFICE O/P EST LOW 20 MIN: CPT | Performed by: INTERNAL MEDICINE

## 2018-04-26 RX ORDER — OMEPRAZOLE 20 MG/1
CAPSULE, DELAYED RELEASE ORAL
COMMUNITY
Start: 2018-04-19 | End: 2018-08-29 | Stop reason: SDUPTHER

## 2018-04-26 RX ORDER — CLOBETASOL PROPIONATE 0.5 MG/G
CREAM TOPICAL
COMMUNITY
Start: 2018-04-13 | End: 2019-01-23

## 2018-04-26 RX ORDER — CLARITHROMYCIN 500 MG/1
TABLET, COATED ORAL
COMMUNITY
Start: 2018-04-04 | End: 2018-04-26

## 2018-04-29 NOTE — PROGRESS NOTES
Subjective   Adele Ly is a 62 y.o. female. Patient is here today for   Chief Complaint   Patient presents with   • Hypertension          Vitals:    04/26/18 1553   BP: 122/70   Pulse: 93   Resp: 17   SpO2: 98%       Past Medical History:   Diagnosis Date   • Arthritis    • Breast cancer    • Colon polyp    • Emphysema of lung    • Genital herpes    • History of diverticulitis    • History of gastric ulcer    • Hypertension    • Infectious viral hepatitis     B      Allergies   Allergen Reactions   • Penicillins Swelling     THROAT SWELLS      Social History     Social History   • Marital status:      Spouse name: N/A   • Number of children: N/A   • Years of education: N/A     Occupational History   •  [employer]     Social History Main Topics   • Smoking status: Current Every Day Smoker     Packs/day: 1.00     Types: Cigarettes     Start date: 1975   • Smokeless tobacco: Current User   • Alcohol use Yes      Comment: COUPLE DAYS A WEEK , BEER   • Drug use: No   • Sexual activity: Defer      Comment: single     Other Topics Concern   • Not on file     Social History Narrative   • No narrative on file        Current Outpatient Prescriptions:   •  acyclovir (ZOVIRAX) 400 MG tablet, Take 1 tablet by mouth Daily., Disp: 30 tablet, Rfl: 4  •  amLODIPine (NORVASC) 10 MG tablet, TAKE ONE TABLET BY MOUTH DAILY, Disp: 90 tablet, Rfl: 0  •  anastrozole (ARIMIDEX) 1 MG tablet, , Disp: , Rfl:   •  Apremilast (OTEZLA PO), Take 1 tablet/day by mouth 2 (Two) Times a Day., Disp: , Rfl:   •  cetirizine (ZYRTEC ALLERGY) 10 MG tablet, Take 10 mg by mouth Daily As Needed., Disp: , Rfl:   •  clobetasol (TEMOVATE) 0.05 % cream, , Disp: , Rfl:   •  ibuprofen (ADVIL,MOTRIN) 200 MG tablet, Take 200 mg by mouth Every 6 (Six) Hours As Needed. PT HOLDING FOR SURGERY, Disp: , Rfl:   •  irbesartan-hydrochlorothiazide (AVALIDE) 300-12.5 MG tablet, Take 1 tablet by mouth Daily., Disp: 30 tablet, Rfl: 11  •  omeprazole (priLOSEC)  20 MG capsule, , Disp: , Rfl:   •  SUMAtriptan (IMITREX) 50 MG tablet, Take one tablet at onset of headache. May repeat dose one time in 2 hours if headache not relieved., Disp: 9 tablet, Rfl: 2  •  varenicline (CHANTIX TOYIN) 0.5 MG X 11 & 1 MG X 42 tablet, Take 0.5 mg one daily on days 1-3 and and 0.5 mg twice daily on days 4-7.Then 1 mg twice daily for a total of 12 weeks., Disp: 53 tablet, Rfl: 0  •  venlafaxine (EFFEXOR) 75 MG tablet, Take 1 tablet by mouth Daily., Disp: 30 tablet, Rfl: 6     Objective     Follow-up on hypertension.    She continues to smoke cigarettes.  She has not yet used the Chantix provided for her.          Hypertension          Review of Systems   Constitutional: Negative.    HENT: Negative.    Respiratory: Positive for cough.    Cardiovascular: Negative.    Gastrointestinal: Negative.    Psychiatric/Behavioral: Negative.        Physical Exam   Constitutional: She appears well-developed and well-nourished.   HENT:   Head: Normocephalic and atraumatic.   Cardiovascular: Normal rate and regular rhythm.    Pulmonary/Chest: Effort normal and breath sounds normal.   Neurological: She is alert.   Psychiatric: She has a normal mood and affect. Her behavior is normal.   Nursing note and vitals reviewed.        Problem List Items Addressed This Visit        Cardiovascular and Mediastinum    BP (high blood pressure) - Primary       Respiratory    Pulmonary emphysema       Other    Tobacco use disorder      Other Visit Diagnoses    None.           PLAN  Her hypertension is better controlled.  Like to have her back in about 3 months to check it again.  She should have labs prior to that visit including comprehensive metabolic panel.        She has pulmonary emphysema.  She continues to smoke cigarettes.  I spent greater than 3 minutes counseling smoking cessation.  I provided her with samples of ANORO ELLIPTA.      No Follow-up on file.

## 2018-05-07 ENCOUNTER — OFFICE VISIT (OUTPATIENT)
Dept: FAMILY MEDICINE CLINIC | Facility: CLINIC | Age: 63
End: 2018-05-07

## 2018-05-07 VITALS
WEIGHT: 166 LBS | SYSTOLIC BLOOD PRESSURE: 140 MMHG | OXYGEN SATURATION: 98 % | RESPIRATION RATE: 16 BRPM | DIASTOLIC BLOOD PRESSURE: 80 MMHG | BODY MASS INDEX: 26.68 KG/M2 | TEMPERATURE: 98 F | HEART RATE: 84 BPM | HEIGHT: 66 IN

## 2018-05-07 DIAGNOSIS — I10 ESSENTIAL HYPERTENSION: Primary | ICD-10-CM

## 2018-05-07 PROCEDURE — 99213 OFFICE O/P EST LOW 20 MIN: CPT | Performed by: INTERNAL MEDICINE

## 2018-05-07 RX ORDER — NEBIVOLOL 5 MG/1
5 TABLET ORAL DAILY
Qty: 30 TABLET | Refills: 3 | Status: SHIPPED | OUTPATIENT
Start: 2018-05-07 | End: 2018-08-29 | Stop reason: SDUPTHER

## 2018-05-07 RX ORDER — MELOXICAM 15 MG/1
15 TABLET ORAL DAILY
Qty: 30 TABLET | Refills: 3 | Status: SHIPPED | OUTPATIENT
Start: 2018-05-07 | End: 2019-01-23

## 2018-05-08 NOTE — PROGRESS NOTES
Subjective   Adele Ly is a 62 y.o. female. Patient is here today for   Chief Complaint   Patient presents with   • Dizziness     pt states thinks is caused by inbersartan hctz    • Nausea   • Shoulder Pain     pt would like anti inflamatory pill           Vitals:    05/07/18 1459   BP: 140/80   Pulse: 84   Resp: 16   Temp: 98 °F (36.7 °C)   SpO2: 98%       Past Medical History:   Diagnosis Date   • Arthritis    • Breast cancer    • Colon polyp    • Emphysema of lung    • Genital herpes    • History of diverticulitis    • History of gastric ulcer    • Hypertension    • Infectious viral hepatitis     B      Allergies   Allergen Reactions   • Penicillins Swelling     THROAT SWELLS      Social History     Social History   • Marital status:      Spouse name: N/A   • Number of children: N/A   • Years of education: N/A     Occupational History   •  [employer]     Social History Main Topics   • Smoking status: Current Every Day Smoker     Packs/day: 1.00     Types: Cigarettes     Start date: 1975   • Smokeless tobacco: Current User   • Alcohol use Yes      Comment: COUPLE DAYS A WEEK , BEER   • Drug use: No   • Sexual activity: Defer      Comment: single     Other Topics Concern   • Not on file     Social History Narrative   • No narrative on file        Current Outpatient Prescriptions:   •  acyclovir (ZOVIRAX) 400 MG tablet, Take 1 tablet by mouth Daily., Disp: 30 tablet, Rfl: 4  •  amLODIPine (NORVASC) 10 MG tablet, TAKE ONE TABLET BY MOUTH DAILY, Disp: 90 tablet, Rfl: 0  •  anastrozole (ARIMIDEX) 1 MG tablet, , Disp: , Rfl:   •  Apremilast (OTEZLA PO), Take 1 tablet/day by mouth 2 (Two) Times a Day., Disp: , Rfl:   •  cetirizine (ZYRTEC ALLERGY) 10 MG tablet, Take 10 mg by mouth Daily As Needed., Disp: , Rfl:   •  clobetasol (TEMOVATE) 0.05 % cream, , Disp: , Rfl:   •  ibuprofen (ADVIL,MOTRIN) 200 MG tablet, Take 200 mg by mouth Every 6 (Six) Hours As Needed. PT HOLDING FOR SURGERY, Disp: , Rfl:   •   irbesartan-hydrochlorothiazide (AVALIDE) 300-12.5 MG tablet, Take 1 tablet by mouth Daily., Disp: 30 tablet, Rfl: 11  •  meloxicam (MOBIC) 15 MG tablet, Take 1 tablet by mouth Daily., Disp: 30 tablet, Rfl: 3  •  nebivolol (BYSTOLIC) 5 MG tablet, Take 1 tablet by mouth Daily., Disp: 30 tablet, Rfl: 3  •  omeprazole (priLOSEC) 20 MG capsule, , Disp: , Rfl:   •  SUMAtriptan (IMITREX) 50 MG tablet, Take one tablet at onset of headache. May repeat dose one time in 2 hours if headache not relieved., Disp: 9 tablet, Rfl: 2  •  varenicline (CHANTIX TOYIN) 0.5 MG X 11 & 1 MG X 42 tablet, Take 0.5 mg one daily on days 1-3 and and 0.5 mg twice daily on days 4-7.Then 1 mg twice daily for a total of 12 weeks., Disp: 53 tablet, Rfl: 0  •  venlafaxine (EFFEXOR) 75 MG tablet, Take 1 tablet by mouth Daily., Disp: 30 tablet, Rfl: 6     Objective     She complained of headache and nausea which she felt was secondary to irbesartan/hydrochlorothiazide.  She stopped taking his medication and those symptoms resolved.    She tells me that she has a sister with hypertension.  Her sister takes by systolic and that controls her blood pressure.  Well.  She asked if I might consider writing this prescription for her.      Dizziness   Associated symptoms include nausea.   Nausea   Associated symptoms include nausea.        Review of Systems   Gastrointestinal: Positive for nausea.   Neurological: Positive for dizziness.       Physical Exam   Constitutional: She is oriented to person, place, and time. She appears well-developed and well-nourished.   Pulmonary/Chest: Effort normal.   Neurological: She is alert and oriented to person, place, and time.   Psychiatric: She has a normal mood and affect. Her behavior is normal.   Nursing note and vitals reviewed.        Problem List Items Addressed This Visit        Cardiovascular and Mediastinum    BP (high blood pressure) - Primary    Relevant Medications    nebivolol (BYSTOLIC) 5 MG tablet      Other  Visit Diagnoses    None.           PLAN  She has essential hypertension.  I asked her to start taking by Medley 5 mg daily.  I like to have her back in about 4 weeks to recheck her blood pressure.  No Follow-up on file.

## 2018-05-11 RX ORDER — AMLODIPINE BESYLATE 10 MG/1
TABLET ORAL
Qty: 30 TABLET | Refills: 0 | Status: SHIPPED | OUTPATIENT
Start: 2018-05-11 | End: 2018-06-04 | Stop reason: SDUPTHER

## 2018-05-15 ENCOUNTER — OFFICE VISIT (OUTPATIENT)
Dept: ONCOLOGY | Facility: CLINIC | Age: 63
End: 2018-05-15

## 2018-05-15 ENCOUNTER — LAB (OUTPATIENT)
Dept: LAB | Facility: HOSPITAL | Age: 63
End: 2018-05-15

## 2018-05-15 VITALS
HEIGHT: 67 IN | WEIGHT: 168.2 LBS | DIASTOLIC BLOOD PRESSURE: 70 MMHG | BODY MASS INDEX: 26.4 KG/M2 | OXYGEN SATURATION: 99 % | SYSTOLIC BLOOD PRESSURE: 120 MMHG | RESPIRATION RATE: 16 BRPM | TEMPERATURE: 98 F | HEART RATE: 60 BPM

## 2018-05-15 DIAGNOSIS — C50.919 MALIGNANT NEOPLASM OF BREAST IN FEMALE, ESTROGEN RECEPTOR POSITIVE, UNSPECIFIED LATERALITY, UNSPECIFIED SITE OF BREAST (HCC): ICD-10-CM

## 2018-05-15 DIAGNOSIS — Z17.0 MALIGNANT NEOPLASM OF OVERLAPPING SITES OF RIGHT BREAST IN FEMALE, ESTROGEN RECEPTOR POSITIVE (HCC): Primary | ICD-10-CM

## 2018-05-15 DIAGNOSIS — C50.811 MALIGNANT NEOPLASM OF OVERLAPPING SITES OF RIGHT BREAST IN FEMALE, ESTROGEN RECEPTOR POSITIVE (HCC): Primary | ICD-10-CM

## 2018-05-15 DIAGNOSIS — Z17.0 MALIGNANT NEOPLASM OF BREAST IN FEMALE, ESTROGEN RECEPTOR POSITIVE, UNSPECIFIED LATERALITY, UNSPECIFIED SITE OF BREAST (HCC): ICD-10-CM

## 2018-05-15 LAB
BASOPHILS # BLD AUTO: 0.08 10*3/MM3 (ref 0–0.1)
BASOPHILS NFR BLD AUTO: 0.8 % (ref 0–1.1)
DEPRECATED RDW RBC AUTO: 47.9 FL (ref 37–49)
EOSINOPHIL # BLD AUTO: 0.1 10*3/MM3 (ref 0–0.36)
EOSINOPHIL NFR BLD AUTO: 1 % (ref 1–5)
ERYTHROCYTE [DISTWIDTH] IN BLOOD BY AUTOMATED COUNT: 14.4 % (ref 11.7–14.5)
HCT VFR BLD AUTO: 41.5 % (ref 34–45)
HGB BLD-MCNC: 14.1 G/DL (ref 11.5–14.9)
IMM GRANULOCYTES # BLD: 0.04 10*3/MM3 (ref 0–0.03)
IMM GRANULOCYTES NFR BLD: 0.4 % (ref 0–0.5)
LYMPHOCYTES # BLD AUTO: 3.52 10*3/MM3 (ref 1–3.5)
LYMPHOCYTES NFR BLD AUTO: 35.6 % (ref 20–49)
MCH RBC QN AUTO: 31.2 PG (ref 27–33)
MCHC RBC AUTO-ENTMCNC: 34 G/DL (ref 32–35)
MCV RBC AUTO: 91.8 FL (ref 83–97)
MONOCYTES # BLD AUTO: 0.81 10*3/MM3 (ref 0.25–0.8)
MONOCYTES NFR BLD AUTO: 8.2 % (ref 4–12)
NEUTROPHILS # BLD AUTO: 5.33 10*3/MM3 (ref 1.5–7)
NEUTROPHILS NFR BLD AUTO: 54 % (ref 39–75)
NRBC BLD MANUAL-RTO: 0 /100 WBC (ref 0–0)
PLATELET # BLD AUTO: 251 10*3/MM3 (ref 150–375)
PMV BLD AUTO: 9.1 FL (ref 8.9–12.1)
RBC # BLD AUTO: 4.52 10*6/MM3 (ref 3.9–5)
WBC NRBC COR # BLD: 9.88 10*3/MM3 (ref 4–10)

## 2018-05-15 PROCEDURE — 36415 COLL VENOUS BLD VENIPUNCTURE: CPT | Performed by: INTERNAL MEDICINE

## 2018-05-15 PROCEDURE — 99214 OFFICE O/P EST MOD 30 MIN: CPT | Performed by: INTERNAL MEDICINE

## 2018-05-15 PROCEDURE — 85025 COMPLETE CBC W/AUTO DIFF WBC: CPT | Performed by: INTERNAL MEDICINE

## 2018-05-15 RX ORDER — GABAPENTIN 100 MG/1
300 CAPSULE ORAL DAILY
Qty: 30 CAPSULE | Refills: 0 | Status: SHIPPED | OUTPATIENT
Start: 2018-05-15 | End: 2018-05-15 | Stop reason: SDUPTHER

## 2018-05-15 RX ORDER — GABAPENTIN 100 MG/1
300 CAPSULE ORAL DAILY
Qty: 90 CAPSULE | Refills: 0 | Status: SHIPPED | OUTPATIENT
Start: 2018-05-15 | End: 2018-09-10 | Stop reason: SDUPTHER

## 2018-05-15 NOTE — PROGRESS NOTES
Subjective .     REASONS FOR FOLLOWUP:  1. T1c N0 stage IA invasive ductal carcinoma of the right breast, status post right mastectomy with sentinel lymph node biopsy.  Grade 2 tumor with Claudy score of 6 out of 9, ER positive greater than 90%, MO 90%, HER-2/reuben 3+ by minimal histochemistry.  Patient has DCIS.     2. Patient refuses chemotherapy with Taxol/Herceptin, and is agreeable only for hormonal treatment with aromatase inhibitor.    HISTORY OF PRESENT ILLNESS:  The patient is a 62 y.o. year old female who is here for follow-up with the above-mentioned history.    History of Present Illness   patient is a 62-year-old female with T1 N0 stage IA invasive ductal carcinoma the right breast status post mastectomy with sentinel lymph node biopsy.  She had a grade 2 tumor with Claudy score of 6 out of 9.  ER positive greater than 90%, MO positive greater than 90% and HER-2/reuben was 3+ by immunohistochemistry.  She refused chemotherapy with Taxol Herceptin and currently isbeing treated by Arimidex.  She has significant hot flashes and sweats related to Arimidex.  We had started her on Effexor but she tells me that the Effexor is not helping.  We discussed with her about consideration of gabapentin for hot flashes.    Past Medical History:   Diagnosis Date   • Arthritis    • Breast cancer    • Colon polyp    • Emphysema of lung    • Genital herpes    • History of diverticulitis    • History of gastric ulcer    • Hypertension    • Infectious viral hepatitis     B       ONCOLOGIC HISTORY:  (History from previous dates can be found in the separate document.)  patient is a 62-year-old female who has been seen by Dr. Peterson recently on January 10, 2018 for a newly diagnosed breast cancer area patient had mammogram last year which showed large area of calcifications in the upper-outer quadrant of the right breast measuring 12×6 cm in addition there was a separate area of calcifications about 4 cm away.  At that  time and apparently she did not pursue evaluation of this.  So she came back for imaging this year.     Subsequently she had a repeat mammogram October 2017 which showed in the posterior one third of the upper outer quadrant of the right breast there is an area of focal asymmetry seen in association with microcalcifications.  There are also multiple microcalcifications seen in the posterior one third lateral aspect of the right breast at 9 o'clock position.  There was no areas of architectural distortion in either breast.  There is no evidence of skin thickening or nipple retraction.  Patient then had a diagnostic mammogram on the right side which showed that there were 2 separate clusters of suspicious calcifications seen in the right breast in the upper outer quadrant.  One in the posterior one third near 10 o'clock position and another in the posterior one third near 9 o'clock position.     Ultrasound-guided biopsy was done on November 24, 2017.  The right breast biopsy at 10:30 position showed invasive mammary carcinoma ductal type.  It is grade 2 with a Claudy score of 6 out of 9.  Maximum dimension of invasive carcinoma is 9 mm.  Associated ductal carcinoma in situ is present.  It is high-grade with comedonecrosis.  The biopsy of the right breast at 9 o'clock position showed ductal carcinoma in situ, high nuclear grade with comedonecrosis.  No definite invasion identified.  Maximum span of DCIS core was 5 mm.  Estrogen receptors on the invasive carcinoma was greater than 90%, progesterone receptor was 90% and HER-2/reuben was 3+ by minimal histochemistry.     Patient saw Dr. Peterson and a right total mastectomy was done on January 10, 2018.  The tumor size was 16 mm invasive ductal carcinoma.  It had a [place] score of 6 out of 9, grade 2, single focus of invasive carcinoma.  Ductal carcinoma in situ is present at the site of invasive carcinoma as well as in sections obtained inferior and lateral to the  site of prior biopsy as well as in the sections from upper outer and lower quadrants.  2 sentinel lymph nodes were removed and they were both negative.  So it's a T1c N0 invasive ductal carcinoma of the right breast.     The margins were uninvolved by invasive carcinoma and distance from the closest margin is 3 mm from the deep margin.  Margins were uninvolved uninvolved by DCIS.  Distance from closest margin DCIS is less than 0.5 mm from deep margin and DCIS is less than 0.5 mm from the superficial margin.     Patient is here to discuss treatment options.   Patient's final pathology at mastectomy showed that it was a 1.6 cm mass, grade 2 invasive ductal carcinoma, [place] score of 6 out of 9 with the ER/MS positive and HER-2/reuben 3+ positive.  I had a lengthy discussion with the patient that it's important to consider chemotherapy with weekly Taxol ×12 weeks along with Herceptin for 1 year.  Patient refuses any chemotherapy.  She understands the risks of the cancer coming back to the visceral organs or to the brain.  I told her that patients can recur within the first 5 years with a HER-2 positive tumors though distant recurrences can also occur given her ER/MS positivity.     I explained to her that the side effects of chemotherapy are not significant and most of them up very tolerable and decreases the chance of recurrence significantly.  She refuses to do so I will also present this case in the breast cancer conference to discuss with the pathologist about the margins.  However patient does not want any radiation treatments.     Ultimately we discussed about hormonal treatment with aromatase inhibitors and explained to her in length the side effects of Arimidex and tamoxifen.  We recommend postmenopausal be discussed about starting Arimidex.     Current Outpatient Prescriptions on File Prior to Visit   Medication Sig Dispense Refill   • acyclovir (ZOVIRAX) 400 MG tablet Take 1 tablet by mouth Daily. 30 tablet  4   • amLODIPine (NORVASC) 10 MG tablet TAKE ONE TABLET BY MOUTH DAILY 30 tablet 0   • anastrozole (ARIMIDEX) 1 MG tablet      • Apremilast (OTEZLA PO) Take 1 tablet/day by mouth 2 (Two) Times a Day.     • cetirizine (ZYRTEC ALLERGY) 10 MG tablet Take 10 mg by mouth Daily As Needed.     • clobetasol (TEMOVATE) 0.05 % cream      • ibuprofen (ADVIL,MOTRIN) 200 MG tablet Take 200 mg by mouth Every 6 (Six) Hours As Needed. PT HOLDING FOR SURGERY     • irbesartan-hydrochlorothiazide (AVALIDE) 300-12.5 MG tablet Take 1 tablet by mouth Daily. 30 tablet 11   • meloxicam (MOBIC) 15 MG tablet Take 1 tablet by mouth Daily. 30 tablet 3   • nebivolol (BYSTOLIC) 5 MG tablet Take 1 tablet by mouth Daily. 30 tablet 3   • omeprazole (priLOSEC) 20 MG capsule      • SUMAtriptan (IMITREX) 50 MG tablet Take one tablet at onset of headache. May repeat dose one time in 2 hours if headache not relieved. 9 tablet 2   • varenicline (CHANTIX TOYIN) 0.5 MG X 11 & 1 MG X 42 tablet Take 0.5 mg one daily on days 1-3 and and 0.5 mg twice daily on days 4-7.Then 1 mg twice daily for a total of 12 weeks. 53 tablet 0   • venlafaxine (EFFEXOR) 75 MG tablet Take 1 tablet by mouth Daily. 30 tablet 6     No current facility-administered medications on file prior to visit.        ALLERGIES:     Allergies   Allergen Reactions   • Penicillins Swelling     THROAT SWELLS       Social History     Social History   • Marital status:      Spouse name: N/A   • Number of children: N/A   • Years of education: N/A     Occupational History   •  [employer]     Social History Main Topics   • Smoking status: Current Every Day Smoker     Packs/day: 1.00     Types: Cigarettes     Start date: 1975   • Smokeless tobacco: Current User   • Alcohol use Yes      Comment: COUPLE DAYS A WEEK , BEER   • Drug use: No   • Sexual activity: Defer      Comment: single     Other Topics Concern   • Not on file     Social History Narrative   • No narrative on file          Cancer-related family history includes Breast cancer (age of onset: 60) in her maternal aunt and maternal grandmother; Colon cancer in her maternal uncle; Ovarian cancer (age of onset: 41) in her paternal aunt. There is no history of Endometrial cancer.     Review of Systems   Constitutional: Negative for fatigue and unexpected weight change.   Respiratory: Negative for cough, chest tightness, shortness of breath and wheezing.    Cardiovascular: Negative for chest pain, palpitations and leg swelling.   Gastrointestinal: Negative for abdominal pain, blood in stool, diarrhea, nausea and vomiting.   All other systems reviewed and are negative.     Patient has significant hot flashes and sweats related to Arimidex.      Objective      There were no vitals filed for this visit.  Current Status 2/1/2018   ECOG score 0       Physical Exam      GENERAL:  Well-developed, well-nourished in no acute distress.   NECK:  Supple with good range of motion; no thyromegaly or masses, no JVD.  LYMPHATICS:  No cervical, supraclavicular, axillary or inguinal adenopathy.  CHEST:  Lungs clear to auscultation. Good airflow.  CARDIAC:  Regular rate and rhythm without murmurs, rubs or gallops. Normal S1,S2.  ABDOMEN:  Soft, nontender with no hepatosplenomegaly or masses.  EXTREMITIES:  No clubbing, cyanosis or edema.  NEUROLOGICAL:  Cranial Nerves II-XII grossly intact.  No focal neurological deficits.  PSYCHIATRIC:  Normal affect and mood.        RECENT LABS:  Hematology WBC   Date Value Ref Range Status   02/12/2018 6.69 4.50 - 10.70 10*3/mm3 Final   02/01/2018 9.95 4.00 - 10.00 10*3/mm3 Final     RBC   Date Value Ref Range Status   02/12/2018 4.48 3.90 - 5.20 10*6/mm3 Final   02/01/2018 4.83 3.90 - 5.00 10*6/mm3 Final     Hemoglobin   Date Value Ref Range Status   02/12/2018 13.9 11.9 - 15.5 g/dL Final   02/01/2018 15.0 (H) 11.5 - 14.9 g/dL Final     Hematocrit   Date Value Ref Range Status   02/12/2018 41.6 35.6 - 45.5 % Final    02/01/2018 43.2 34.0 - 45.0 % Final     Platelets   Date Value Ref Range Status   02/12/2018 244 140 - 500 10*3/mm3 Final   02/01/2018 276 150 - 375 10*3/mm3 Final        Assessment/Plan   1. T1c N0 stage IA invasive ductal carcinoma of the right breast, status post right mastectomy with sentinel lymph node biopsy.  Grade 2 tumor with Claudy score of 6 out of 9, ER positive greater than 90%, CT 90%, HER-2/reuben 3+ by minimal histochemistry.  Patient has DCIS. Patient's final pathology at mastectomy showed that it was a 1.6 cm mass, grade 2 invasive ductal carcinoma, [place] score of 6 out of 9 with the ER/CT positive and HER-2/reuben 3+ positive.  I had a lengthy discussion with the patient that it's important to consider chemotherapy with weekly Taxol ×12 weeks along with Herceptin for 1 year.  Patient refuses any chemotherapy.  She understands the risks of the cancer coming back to the visceral organs or to the brain.  I told her that patients can recur within the first 5 years with a HER-2 positive tumors though distant recurrences can also occur given her ER/CT positivity.     · Patient started on Arimidex.  · Significant hot flashes related to Arimidex and Effexor is not helping.  We'll try gabapentin for hot flashes and sweats.  ·    2.  Significant smoking, she is trying to quit.     3.  Family history of breast cancer with her aunt having had breast cancer and grandmother with breast cancer on her mother's side.  Will likely require genetic counseling but will need to discuss it at her next appointment.  She does not have children and I'm not sure she will be very keen to get tested.  But she does have 2 sisters and it might benefit them if we do the genetic testing.    Plan  1.  Continue Arimidex 1 mg daily    2.  Start gabapentin 300 mg at bedtime for hot flashes    3.  She is to call me with any side effects related to gabapentin.  I did discuss the side effects about rate chance for low white count  and to see if she can come earlier than 4 months for CBC check.  At present she does not want to do that because of her expenses.    4.  We will see her back in 4 months with labs.    Pooja Moncada MD         Cc:  Juan Peterson MD

## 2018-05-16 RX ORDER — ANASTROZOLE 1 MG/1
1 TABLET ORAL DAILY
Qty: 30 TABLET | Refills: 2 | Status: SHIPPED | OUTPATIENT
Start: 2018-05-16 | End: 2019-01-23

## 2018-05-29 RX ORDER — ACYCLOVIR 400 MG/1
TABLET ORAL
Qty: 30 TABLET | Refills: 3 | Status: SHIPPED | OUTPATIENT
Start: 2018-05-29 | End: 2018-09-29 | Stop reason: SDUPTHER

## 2018-06-04 ENCOUNTER — TELEPHONE (OUTPATIENT)
Dept: FAMILY MEDICINE CLINIC | Facility: CLINIC | Age: 63
End: 2018-06-04

## 2018-06-04 RX ORDER — AMLODIPINE BESYLATE 10 MG/1
10 TABLET ORAL DAILY
Qty: 30 TABLET | Refills: 5 | Status: SHIPPED | OUTPATIENT
Start: 2018-06-04 | End: 2018-12-11 | Stop reason: SDUPTHER

## 2018-06-04 RX ORDER — AMLODIPINE BESYLATE 10 MG/1
10 TABLET ORAL DAILY
Qty: 30 TABLET | Refills: 5 | Status: SHIPPED | OUTPATIENT
Start: 2018-06-04 | End: 2018-06-04 | Stop reason: SDUPTHER

## 2018-06-04 NOTE — TELEPHONE ENCOUNTER
Left voicemail letting her know I sent this to Ramya    ----- Message from Arely Tao sent at 6/4/2018  9:21 AM EDT -----  PT NEEDS SCRIPT REFILL FOR amLODIPine (NORVASC) 10 MG TAKE ONE TABLET BY MOUTH DAILY #30    PLEASE CONTACT PT WHEN READY FOR  [phone]

## 2018-06-11 RX ORDER — ANASTROZOLE 1 MG/1
TABLET ORAL
Qty: 30 TABLET | Refills: 5 | Status: SHIPPED | OUTPATIENT
Start: 2018-06-11 | End: 2019-01-23

## 2018-08-28 ENCOUNTER — APPOINTMENT (OUTPATIENT)
Dept: ONCOLOGY | Facility: CLINIC | Age: 63
End: 2018-08-28

## 2018-08-28 ENCOUNTER — APPOINTMENT (OUTPATIENT)
Dept: LAB | Facility: HOSPITAL | Age: 63
End: 2018-08-28

## 2018-08-29 RX ORDER — OMEPRAZOLE 20 MG/1
CAPSULE, DELAYED RELEASE ORAL
Qty: 30 CAPSULE | Refills: 4 | Status: SHIPPED | OUTPATIENT
Start: 2018-08-29 | End: 2019-01-23 | Stop reason: SDUPTHER

## 2018-08-29 RX ORDER — NEBIVOLOL 5 MG/1
5 TABLET ORAL DAILY
Qty: 30 TABLET | Refills: 3 | Status: SHIPPED | OUTPATIENT
Start: 2018-08-29 | End: 2019-01-10 | Stop reason: SDUPTHER

## 2018-08-29 RX ORDER — OMEPRAZOLE 20 MG/1
20 CAPSULE, DELAYED RELEASE ORAL DAILY
Qty: 30 CAPSULE | Refills: 2 | Status: SHIPPED | OUTPATIENT
Start: 2018-08-29 | End: 2019-01-23

## 2018-09-13 RX ORDER — GABAPENTIN 100 MG/1
CAPSULE ORAL
Qty: 90 CAPSULE | Refills: 0 | Status: SHIPPED | OUTPATIENT
Start: 2018-09-13 | End: 2019-01-23

## 2018-10-01 RX ORDER — ACYCLOVIR 400 MG/1
TABLET ORAL
Qty: 30 TABLET | Refills: 2 | Status: SHIPPED | OUTPATIENT
Start: 2018-10-01 | End: 2019-01-03 | Stop reason: SDUPTHER

## 2018-10-22 ENCOUNTER — DOCUMENTATION (OUTPATIENT)
Dept: ONCOLOGY | Facility: CLINIC | Age: 63
End: 2018-10-22

## 2018-10-22 NOTE — PROGRESS NOTES
Pt returned a call from [facility], to see if we could help in some way. She lives on a very tight budget, from [facility] to [facility]. She has not kept appointments here as she could not afford the copay. [facility] explained the [facility] financial assistance program to her, and mailed an application to her. She was encouraged to fill it out and return to [facility] as soon as possible, so we can submit it for her.     She also has only one bra, and apparently has been making payments to [facility] (for her prosthesis?). [facility] will seek resources for her.    She called during her break from work, and was anxious to get back. She said her income is very limited and she is overwhelmed with bills.

## 2018-10-25 ENCOUNTER — TELEPHONE (OUTPATIENT)
Dept: OTHER | Facility: HOSPITAL | Age: 63
End: 2018-10-25

## 2018-10-25 NOTE — TELEPHONE ENCOUNTER
I tried to call Adele today to see how things are going. I left a message for her to please call me at her convenience.

## 2018-12-11 RX ORDER — AMLODIPINE BESYLATE 10 MG/1
TABLET ORAL
Qty: 30 TABLET | Refills: 4 | Status: SHIPPED | OUTPATIENT
Start: 2018-12-11 | End: 2019-01-23 | Stop reason: SDUPTHER

## 2018-12-12 RX ORDER — AMLODIPINE BESYLATE 10 MG/1
TABLET ORAL
Qty: 30 TABLET | Refills: 4 | OUTPATIENT
Start: 2018-12-12

## 2018-12-17 ENCOUNTER — APPOINTMENT (OUTPATIENT)
Dept: LAB | Facility: HOSPITAL | Age: 63
End: 2018-12-17

## 2018-12-17 ENCOUNTER — APPOINTMENT (OUTPATIENT)
Dept: ONCOLOGY | Facility: CLINIC | Age: 63
End: 2018-12-17

## 2018-12-20 ENCOUNTER — DOCUMENTATION (OUTPATIENT)
Dept: ONCOLOGY | Facility: CLINIC | Age: 63
End: 2018-12-20

## 2018-12-20 NOTE — PROGRESS NOTES
LCSW called to discuss her missing her recent appointment again. Left message regarding financial assistance program and [facility]. Encouraged her to call.

## 2019-01-03 ENCOUNTER — TELEPHONE (OUTPATIENT)
Dept: OBSTETRICS AND GYNECOLOGY | Age: 64
End: 2019-01-03

## 2019-01-03 RX ORDER — ACYCLOVIR 400 MG/1
400 TABLET ORAL DAILY
Qty: 30 TABLET | Refills: 2 | Status: SHIPPED | OUTPATIENT
Start: 2019-01-03 | End: 2019-01-08 | Stop reason: SDUPTHER

## 2019-01-08 RX ORDER — ACYCLOVIR 400 MG/1
400 TABLET ORAL DAILY
Qty: 30 TABLET | Refills: 2 | Status: SHIPPED | OUTPATIENT
Start: 2019-01-08 | End: 2019-04-29 | Stop reason: SDUPTHER

## 2019-01-08 RX ORDER — ACYCLOVIR 400 MG/1
TABLET ORAL
Qty: 30 TABLET | Refills: 1 | OUTPATIENT
Start: 2019-01-08

## 2019-01-10 RX ORDER — NEBIVOLOL HYDROCHLORIDE 5 MG/1
TABLET ORAL
Qty: 30 TABLET | Refills: 0 | Status: SHIPPED | OUTPATIENT
Start: 2019-01-10 | End: 2019-10-28

## 2019-01-23 ENCOUNTER — OFFICE VISIT (OUTPATIENT)
Dept: FAMILY MEDICINE CLINIC | Facility: CLINIC | Age: 64
End: 2019-01-23

## 2019-01-23 VITALS
RESPIRATION RATE: 16 BRPM | HEIGHT: 67 IN | SYSTOLIC BLOOD PRESSURE: 120 MMHG | HEART RATE: 68 BPM | WEIGHT: 172 LBS | OXYGEN SATURATION: 98 % | BODY MASS INDEX: 27 KG/M2 | DIASTOLIC BLOOD PRESSURE: 70 MMHG | TEMPERATURE: 98.6 F

## 2019-01-23 DIAGNOSIS — I10 ESSENTIAL HYPERTENSION: Primary | ICD-10-CM

## 2019-01-23 PROCEDURE — 99213 OFFICE O/P EST LOW 20 MIN: CPT | Performed by: INTERNAL MEDICINE

## 2019-01-23 RX ORDER — AMLODIPINE BESYLATE 10 MG/1
10 TABLET ORAL DAILY
Qty: 30 TABLET | Refills: 4 | Status: SHIPPED | OUTPATIENT
Start: 2019-01-23 | End: 2020-03-02

## 2019-01-23 RX ORDER — OMEPRAZOLE 20 MG/1
20 CAPSULE, DELAYED RELEASE ORAL DAILY
Qty: 30 CAPSULE | Refills: 4 | Status: SHIPPED | OUTPATIENT
Start: 2019-01-23 | End: 2019-01-24 | Stop reason: SDUPTHER

## 2019-01-23 NOTE — PROGRESS NOTES
Subjective   Adele Ly is a 63 y.o. female. Patient is here today for   Chief Complaint   Patient presents with   • Hypertension     discuss changing blood pressure medication due to bystolic being denied by insurance company           Vitals:    01/23/19 1522   BP: 120/70   Pulse: 68   Resp: 16   Temp: 98.6 °F (37 °C)   SpO2: 98%       Past Medical History:   Diagnosis Date   • Arthritis    • Breast cancer (CMS/HCC)     Right, Stage IA   • Colon polyp    • Emphysema of lung (CMS/HCC)    • Genital herpes    • History of diverticulitis    • History of gastric ulcer    • Hypertension    • Infectious viral hepatitis     B      Allergies   Allergen Reactions   • Penicillins Swelling     THROAT SWELLS      Social History     Socioeconomic History   • Marital status:      Spouse name: Not on file   • Number of children: 0   • Years of education: Not on file   • Highest education level: Not on file   Social Needs   • Financial resource strain: Not on file   • Food insecurity - worry: Not on file   • Food insecurity - inability: Not on file   • Transportation needs - medical: Not on file   • Transportation needs - non-medical: Not on file   Occupational History     Employer: [employer]   Tobacco Use   • Smoking status: Current Every Day Smoker     Packs/day: 1.00     Types: Cigarettes     Start date: 1975   • Smokeless tobacco: Current User   Substance and Sexual Activity   • Alcohol use: Yes     Comment: COUPLE DAYS A WEEK , BEER   • Drug use: No   • Sexual activity: Defer     Comment: single   Other Topics Concern   • Not on file   Social History Narrative   • Not on file        Current Outpatient Medications:   •  acyclovir (ZOVIRAX) 400 MG tablet, Take 1 tablet by mouth Daily. Take no more than 5 doses a day., Disp: 30 tablet, Rfl: 2  •  amLODIPine (NORVASC) 10 MG tablet, TAKE ONE TABLET BY MOUTH DAILY, Disp: 30 tablet, Rfl: 4  •  Apremilast (OTEZLA PO), Take 1 tablet/day by mouth 2 (Two) Times a Day.,  Disp: , Rfl:   •  ibuprofen (ADVIL,MOTRIN) 200 MG tablet, Take 200 mg by mouth Every 6 (Six) Hours As Needed. PT HOLDING FOR SURGERY, Disp: , Rfl:   •  omeprazole (priLOSEC) 20 MG capsule, TAKE ONE CAPSULE BY MOUTH DAILY, Disp: 30 capsule, Rfl: 4  •  SUMAtriptan (IMITREX) 50 MG tablet, Take one tablet at onset of headache. May repeat dose one time in 2 hours if headache not relieved., Disp: 9 tablet, Rfl: 2  •  BYSTOLIC 5 MG tablet, TAKE ONE TABLET BY MOUTH DAILY, Disp: 30 tablet, Rfl: 0     Objective     I asked the patient how she was doing.  She told me that her insurance company is making it difficult for her to get a prescription for bysystolic that she has taken for years.    She and I reviewed her medication list.  We cleared out the medications unless she says she is no longer taking.  Among these,  Arimidex.  She has a history of breast cancer.  I asked her not to discontinue this medication when she was directed to do so by her oncologist.  She told me that she could not afford this medication.  She also tells me that she could not afford to follow-up with her oncologist.           Review of Systems   Constitutional: Negative.    HENT: Negative.    Cardiovascular: Negative.    Musculoskeletal: Negative.        Physical Exam   Constitutional: She is oriented to person, place, and time. She appears well-developed and well-nourished.   Pulmonary/Chest: Effort normal.   Neurological: She is alert and oriented to person, place, and time.   Psychiatric: She has a normal mood and affect. Her behavior is normal.   Nursing note and vitals reviewed.        Problem List Items Addressed This Visit        Cardiovascular and Mediastinum    BP (high blood pressure) - Primary            PLAN  She tells me that the Erbie Lacie/hydrochlorothiazide that I had written for her in March of last year she couldn't tolerate.  She had been taking amlodipine 5 mg and carvedilol 10 mg once daily since that time.  She thinks that her  blood pressure has been well-controlled, but it has not been checked.  I gave her samples of by systolic sufficient to last her 6 weeks.  I asked her to take this medication once daily along with amlodipine 5 mg once daily.  I asked her to follow-up with me in 3-4 weeks.  No Follow-up on file.

## 2019-01-24 ENCOUNTER — TELEPHONE (OUTPATIENT)
Dept: FAMILY MEDICINE CLINIC | Facility: CLINIC | Age: 64
End: 2019-01-24

## 2019-01-24 RX ORDER — OMEPRAZOLE 20 MG/1
20 CAPSULE, DELAYED RELEASE ORAL DAILY
Qty: 30 CAPSULE | Refills: 10 | Status: SHIPPED | OUTPATIENT
Start: 2019-01-24 | End: 2020-01-27

## 2019-01-24 NOTE — TELEPHONE ENCOUNTER
COMPLETE    ----- Message from Arely Tao sent at 1/23/2019  4:08 PM EST -----  Pt need script refill for omeprazole (priLOSEC) 20 MG TAKE ONE CAPSULE BY MOUTH DAILY #30 please send to [facility] on [address] [facility]

## 2019-01-28 ENCOUNTER — TELEPHONE (OUTPATIENT)
Dept: OBSTETRICS AND GYNECOLOGY | Age: 64
End: 2019-01-28

## 2019-01-28 NOTE — TELEPHONE ENCOUNTER
Pharmacy called asking about the correct dosing of acyclovir that was written on 1/9/19. Please advise

## 2019-01-28 NOTE — TELEPHONE ENCOUNTER
Notify [facility] pharmacy that it should be one daily # 30 with 3 refills

## 2019-02-22 ENCOUNTER — OFFICE VISIT (OUTPATIENT)
Dept: FAMILY MEDICINE CLINIC | Facility: CLINIC | Age: 64
End: 2019-02-22

## 2019-02-22 VITALS
HEIGHT: 67 IN | BODY MASS INDEX: 27 KG/M2 | DIASTOLIC BLOOD PRESSURE: 68 MMHG | RESPIRATION RATE: 16 BRPM | HEART RATE: 70 BPM | OXYGEN SATURATION: 98 % | WEIGHT: 172 LBS | SYSTOLIC BLOOD PRESSURE: 108 MMHG | TEMPERATURE: 98.2 F

## 2019-02-22 DIAGNOSIS — C50.811 MALIGNANT NEOPLASM OF OVERLAPPING SITES OF RIGHT BREAST IN FEMALE, ESTROGEN RECEPTOR POSITIVE (HCC): ICD-10-CM

## 2019-02-22 DIAGNOSIS — Z17.0 MALIGNANT NEOPLASM OF OVERLAPPING SITES OF RIGHT BREAST IN FEMALE, ESTROGEN RECEPTOR POSITIVE (HCC): ICD-10-CM

## 2019-02-22 DIAGNOSIS — J43.9 PULMONARY EMPHYSEMA, UNSPECIFIED EMPHYSEMA TYPE (HCC): ICD-10-CM

## 2019-02-22 DIAGNOSIS — I10 ESSENTIAL HYPERTENSION: Primary | ICD-10-CM

## 2019-02-22 PROCEDURE — 99214 OFFICE O/P EST MOD 30 MIN: CPT | Performed by: INTERNAL MEDICINE

## 2019-02-22 RX ORDER — LISINOPRIL 20 MG/1
20 TABLET ORAL DAILY
Qty: 30 TABLET | Refills: 5 | Status: SHIPPED | OUTPATIENT
Start: 2019-02-22 | End: 2019-09-26 | Stop reason: SDUPTHER

## 2019-02-24 NOTE — PROGRESS NOTES
Subjective   Adele Ly is a 63 y.o. female. Patient is here today for   Chief Complaint   Patient presents with   • Blood Pressure Check          Vitals:    02/22/19 1522   BP: 108/68   Pulse: 70   Resp: 16   Temp: 98.2 °F (36.8 °C)   SpO2: 98%       Past Medical History:   Diagnosis Date   • Arthritis    • Breast cancer (CMS/HCC)     Right, Stage IA   • Colon polyp    • Emphysema of lung (CMS/HCC)    • Genital herpes    • History of diverticulitis    • History of gastric ulcer    • Hypertension    • Infectious viral hepatitis     B      Allergies   Allergen Reactions   • Penicillins Swelling     THROAT SWELLS      Social History     Socioeconomic History   • Marital status:      Spouse name: Not on file   • Number of children: 0   • Years of education: Not on file   • Highest education level: Not on file   Social Needs   • Financial resource strain: Not on file   • Food insecurity - worry: Not on file   • Food insecurity - inability: Not on file   • Transportation needs - medical: Not on file   • Transportation needs - non-medical: Not on file   Occupational History     Employer: [employer]   Tobacco Use   • Smoking status: Current Every Day Smoker     Packs/day: 1.00     Types: Cigarettes     Start date: 1975   • Smokeless tobacco: Current User   Substance and Sexual Activity   • Alcohol use: Yes     Comment: COUPLE DAYS A WEEK , BEER   • Drug use: No   • Sexual activity: Defer     Comment: single   Other Topics Concern   • Not on file   Social History Narrative   • Not on file        Current Outpatient Medications:   •  acyclovir (ZOVIRAX) 400 MG tablet, Take 1 tablet by mouth Daily. Take no more than 5 doses a day., Disp: 30 tablet, Rfl: 2  •  amLODIPine (NORVASC) 10 MG tablet, Take 1 tablet by mouth Daily., Disp: 30 tablet, Rfl: 4  •  Apremilast (OTEZLA PO), Take 1 tablet/day by mouth 2 (Two) Times a Day., Disp: , Rfl:   •  BYSTOLIC 5 MG tablet, TAKE ONE TABLET BY MOUTH DAILY, Disp: 30 tablet,  Rfl: 0  •  ibuprofen (ADVIL,MOTRIN) 200 MG tablet, Take 200 mg by mouth Every 6 (Six) Hours As Needed. PT HOLDING FOR SURGERY, Disp: , Rfl:   •  omeprazole (priLOSEC) 20 MG capsule, Take 1 capsule by mouth Daily., Disp: 30 capsule, Rfl: 10  •  SUMAtriptan (IMITREX) 50 MG tablet, Take one tablet at onset of headache. May repeat dose one time in 2 hours if headache not relieved., Disp: 9 tablet, Rfl: 2  •  lisinopril (PRINIVIL,ZESTRIL) 20 MG tablet, Take 1 tablet by mouth Daily., Disp: 30 tablet, Rfl: 5     Objective     This very pleasant patient has very little money.  She tells me that she cannot afford the medications prescribed to her by her oncologist to help reduce her risk for recurrence of her breast cancer.    She continues to smoke cigarettes.  She has not yet tried Chantix though I have written prescriptions for her.  She cannot afford the inhalers are prescribed for her in the past.  She asked me for samples today.    She tells me that a sister of hers takes lisinopril.  This works well for her sisters hypertension.  The Bystolic that the patient has taken is too expensive for her.             Review of Systems   Constitutional: Negative.    HENT: Negative.    Respiratory: Negative.    Cardiovascular: Negative.    Musculoskeletal: Negative.    Psychiatric/Behavioral: Negative.        Physical Exam   Constitutional: She is oriented to person, place, and time. She appears well-developed and well-nourished.   HENT:   Head: Normocephalic and atraumatic.   Cardiovascular: Normal rate and regular rhythm.   Pulmonary/Chest: Effort normal.   Neurological: She is alert and oriented to person, place, and time.   Psychiatric: She has a normal mood and affect. Her behavior is normal.   Nursing note and vitals reviewed.        Problem List Items Addressed This Visit        Cardiovascular and Mediastinum    BP (high blood pressure) - Primary    Relevant Medications    lisinopril (PRINIVIL,ZESTRIL) 20 MG tablet        Respiratory    Pulmonary emphysema (CMS/HCC)       Other    Malignant neoplasm of overlapping sites of right breast in female, estrogen receptor positive (CMS/HCC)            PLAN  I asked her to discontinue her by systolic because of the expense.  She will start taking lisinopril 20 mg once daily.  I will have her back in a month to see what her blood pressures doing.    She has pulmonary emphysema.  She and I discussed the importance of smoking cessation for greater than 3 minutes.  I gave her samples of Brio Ellipta.    I asked her to follow-up with her oncologist regarding her history of right breast cancer.  No Follow-up on file.

## 2019-04-29 ENCOUNTER — APPOINTMENT (OUTPATIENT)
Dept: WOMENS IMAGING | Facility: HOSPITAL | Age: 64
End: 2019-04-29

## 2019-04-29 ENCOUNTER — PROCEDURE VISIT (OUTPATIENT)
Dept: OBSTETRICS AND GYNECOLOGY | Age: 64
End: 2019-04-29

## 2019-04-29 ENCOUNTER — OFFICE VISIT (OUTPATIENT)
Dept: OBSTETRICS AND GYNECOLOGY | Age: 64
End: 2019-04-29

## 2019-04-29 VITALS
HEIGHT: 66 IN | WEIGHT: 168 LBS | DIASTOLIC BLOOD PRESSURE: 72 MMHG | BODY MASS INDEX: 27 KG/M2 | SYSTOLIC BLOOD PRESSURE: 124 MMHG

## 2019-04-29 DIAGNOSIS — Z12.31 VISIT FOR SCREENING MAMMOGRAM: Primary | ICD-10-CM

## 2019-04-29 DIAGNOSIS — Z01.419 ENCOUNTER FOR GYNECOLOGICAL EXAMINATION WITHOUT ABNORMAL FINDING: Primary | ICD-10-CM

## 2019-04-29 PROCEDURE — 77067 SCR MAMMO BI INCL CAD: CPT | Performed by: OBSTETRICS & GYNECOLOGY

## 2019-04-29 PROCEDURE — 99396 PREV VISIT EST AGE 40-64: CPT | Performed by: OBSTETRICS & GYNECOLOGY

## 2019-04-29 PROCEDURE — 77067 SCR MAMMO BI INCL CAD: CPT | Performed by: RADIOLOGY

## 2019-04-29 RX ORDER — ACYCLOVIR 400 MG/1
400 TABLET ORAL DAILY
Qty: 30 TABLET | Refills: 12 | Status: SHIPPED | OUTPATIENT
Start: 2019-04-29 | End: 2020-05-26

## 2019-04-29 NOTE — PROGRESS NOTES
"Routine Annual Visit    2019    Patient: Adele Ly          MR#:[id number]      Chief Complaint   Patient presents with   • Annual Exam     PT HERE FOR ROUTINE [facility] AND MG. SHE IS WELL, BUT HAVING A LOT OF HOT FLASHES. LAST PAP 2017 NEG AND NEG HPV. LAST C-SCOPE , REMOVED POLYPS.       History of Present Illness    63 y.o. female  who presents for annual exam.   Doing ok   Lots of hot flashes  Stopped aromatase inhibitor at 10 months, made HF worse and the other meds did not help  Doesn't want to restart, I offered to prescribe it until she gets into Dr Moncada  Pt not sure when she can get to an appt  Declines genetic testing  Declines dexa today  Plans not to do chemo, and likely not to take any aromatase inhibitor  \"I feel fine\"  Has been stressed as water heater went out and dog  recently and she does not make a lot of money    mammo of left breast done today  Due for pap next year  UTD CSC  Will do dexa next year at [facility]  Needs refill of acyclovir  1 ppd tob, unmotivated to quit          No LMP recorded. Patient is postmenopausal.  Obstetric History:  OB History      Para Term  AB Living    0 0 0 0 0 0    SAB TAB Ectopic Molar Multiple Live Births    0 0 0 0 0 0        Obstetric Comments    Took birth control 6 years. Took hormone replacement therapy for 10 years.          Menstrual History:     No LMP recorded. Patient is postmenopausal.       Sexual History:       ________________________________________  Patient Active Problem List   Diagnosis   • BP (high blood pressure)   • Pulmonary emphysema (CMS/HCC)   • Malignant neoplasm of overlapping sites of right breast in female, estrogen receptor positive (CMS/HCC)   • Tobacco use disorder   • Migraine without status migrainosus, not intractable       Past Medical History:   Diagnosis Date   • Arthritis    • Breast cancer (CMS/HCC)     Right, Stage IA   • Colon polyp    • Emphysema of lung (CMS/HCC)    • Genital herpes    • " "History of diverticulitis    • History of gastric ulcer    • Hypertension    • Infectious viral hepatitis     B       Past Surgical History:   Procedure Laterality Date   • BREAST BIOPSY Right 2017    malignant   • COLON RESECTION     • COLOSTOMY     • COLOSTOMY REVISION     • MASTECTOMY WITH SENTINEL NODE BIOPSY AND AXILLARY NODE DISSECTION Right 1/10/2018    Procedure: BREAST MASTECTOMY WITH SENTINEL NODE BIOPSY;  Surgeon: Juan Peterson MD;  Location: [facility];  Service:    • PELVIC LAPAROSCOPY     • TONSILLECTOMY     • WISDOM TOOTH EXTRACTION         Social History     Tobacco Use   Smoking Status Current Every Day Smoker   • Packs/day: 1.00   • Types: Cigarettes   • Start date: 1975   Smokeless Tobacco Current User       has a current medication list which includes the following prescription(s): acyclovir, amlodipine, apremilast, ibuprofen, lisinopril, omeprazole, sumatriptan, and bystolic.  ________________________________________    Current contraception: post menopausal status  History of abnormal Pap smear: no  Family history of Breast cancer: self and aunt  Family history of uterine or ovarian cancer: no  Family History of colon cancer/colon polyps: no  History of abnormal mammogram: yes - personal history of breast cancer- s/p right mastectomy      The following portions of the patient's history were reviewed and updated as appropriate: allergies, current medications, past family history, past medical history, past social history, past surgical history and problem list.    Review of Systems    Pertinent items are noted in HPI.     Objective   Physical Exam    /72   Ht 167.6 cm (66\")   Wt 76.2 kg (168 lb)   BMI 27.12 kg/m²    BP Readings from Last 3 Encounters:   04/29/19 124/72   02/22/19 108/68   01/23/19 120/70      Wt Readings from Last 3 Encounters:   04/29/19 76.2 kg (168 lb)   02/22/19 78 kg (172 lb)   01/23/19 78 kg (172 lb)      BMI: Estimated body mass index is 27.12 kg/m² as " "calculated from the following:    Height as of this encounter: 167.6 cm (66\").    Weight as of this encounter: 76.2 kg (168 lb).      General:   alert, appears stated age and cooperative   Abdomen: soft, non-tender, without masses or organomegaly   Breast: inspection negative, no nipple discharge or bleeding, no masses or nodularity palpable and left breast no masses, right s/p mastectomy , no mass on chest wall   Vulva: normal   Vagina: normal mucosa   Cervix: no cervical motion tenderness and no lesions   Uterus: normal size, mobile or non-tender   Adnexa: no mass, fullness, tenderness     Assessment:    1. Normal annual exam   Assessment     ICD-10-CM ICD-9-CM   1. Encounter for gynecological examination without abnormal finding Z01.419 V72.31     Plan:    Plan     [x]  Mammogram request made  []  PAP done  []  Labs:   []  GC/Chl/TV  []  DEXA scan   []  Referral for colonoscopy:       Adele was seen today for annual exam.    Diagnoses and all orders for this visit:    Encounter for gynecological examination without abnormal finding    Other orders  -     acyclovir (ZOVIRAX) 400 MG tablet; Take 1 tablet by mouth Daily. Take no more than 5 doses a day.      Pap due next year  Smoker and not motivated to quit  Declines chemo or medical treatment of breast cancer as she feels fine, discussed rationale for prevention of future recurrence despite feeling well now  Pt declines all intervention  No time to do dexa and agrees to do next year  Declines genetic testing for BRCA (aunt with breast cancer as well, also around age 60)  Pap due next year        Counseling:  --Nutrition: Stressed importance of moderation and caloric balance, stressed fresh fruit and vegetables  --Exercise: Stressed the importance of regular exercise. 3-5 times weekly   - Discussed screening mammogram recommendations.   --Discussed benefits of screening colonoscopy- age 45 unless FH  --Discussed pap smear screening recommendations  "

## 2019-07-03 ENCOUNTER — APPOINTMENT (OUTPATIENT)
Dept: CT IMAGING | Facility: HOSPITAL | Age: 64
End: 2019-07-03

## 2019-07-03 ENCOUNTER — HOSPITAL ENCOUNTER (EMERGENCY)
Facility: HOSPITAL | Age: 64
Discharge: HOME | End: 2019-07-03
Attending: EMERGENCY MEDICINE | Admitting: EMERGENCY MEDICINE

## 2019-07-03 VITALS
BODY MASS INDEX: 25.76 KG/M2 | OXYGEN SATURATION: 96 % | HEIGHT: 68 IN | WEIGHT: 170 LBS | RESPIRATION RATE: 18 BRPM | HEART RATE: 79 BPM | TEMPERATURE: 98 F | SYSTOLIC BLOOD PRESSURE: 141 MMHG | DIASTOLIC BLOOD PRESSURE: 79 MMHG

## 2019-07-03 DIAGNOSIS — R10.32 LEFT LOWER QUADRANT PAIN: ICD-10-CM

## 2019-07-03 DIAGNOSIS — R42 LIGHTHEADEDNESS: Primary | ICD-10-CM

## 2019-07-03 DIAGNOSIS — R19.00 PELVIC MASS: ICD-10-CM

## 2019-07-03 LAB
ALBUMIN SERPL-MCNC: 4.1 G/DL (ref 3.5–5.2)
ALBUMIN/GLOB SERPL: 1.6 G/DL
ALP SERPL-CCNC: 63 U/L (ref 39–117)
ALT SERPL W P-5'-P-CCNC: 6 U/L (ref 1–33)
ANION GAP SERPL CALCULATED.3IONS-SCNC: 14.1 MMOL/L (ref 5–15)
AST SERPL-CCNC: 12 U/L (ref 1–32)
BASOPHILS # BLD AUTO: 0.05 10*3/MM3 (ref 0–0.2)
BASOPHILS NFR BLD AUTO: 0.6 % (ref 0–1.5)
BILIRUB SERPL-MCNC: 0.2 MG/DL (ref 0.2–1.2)
BILIRUB UR QL STRIP: NEGATIVE
BUN BLD-MCNC: 15 MG/DL (ref 8–23)
BUN/CREAT SERPL: 21.1 (ref 7–25)
CALCIUM SPEC-SCNC: 9.6 MG/DL (ref 8.6–10.5)
CHLORIDE SERPL-SCNC: 110 MMOL/L (ref 98–107)
CLARITY UR: CLEAR
CO2 SERPL-SCNC: 20.9 MMOL/L (ref 22–29)
COLOR UR: YELLOW
CREAT BLD-MCNC: 0.71 MG/DL (ref 0.57–1)
DEPRECATED RDW RBC AUTO: 42.3 FL (ref 37–54)
EOSINOPHIL # BLD AUTO: 0.32 10*3/MM3 (ref 0–0.4)
EOSINOPHIL NFR BLD AUTO: 3.6 % (ref 0.3–6.2)
ERYTHROCYTE [DISTWIDTH] IN BLOOD BY AUTOMATED COUNT: 12.7 % (ref 12.3–15.4)
GFR SERPL CREATININE-BSD FRML MDRD: 84 ML/MIN/1.73
GLOBULIN UR ELPH-MCNC: 2.6 GM/DL
GLUCOSE BLD-MCNC: 115 MG/DL (ref 65–99)
GLUCOSE UR STRIP-MCNC: NEGATIVE MG/DL
HCT VFR BLD AUTO: 43.1 % (ref 34–46.6)
HGB BLD-MCNC: 14.4 G/DL (ref 12–15.9)
HGB UR QL STRIP.AUTO: NEGATIVE
IMM GRANULOCYTES # BLD AUTO: 0.03 10*3/MM3 (ref 0–0.05)
IMM GRANULOCYTES NFR BLD AUTO: 0.3 % (ref 0–0.5)
KETONES UR QL STRIP: NEGATIVE
LEUKOCYTE ESTERASE UR QL STRIP.AUTO: NEGATIVE
LIPASE SERPL-CCNC: 57 U/L (ref 13–60)
LYMPHOCYTES # BLD AUTO: 3.24 10*3/MM3 (ref 0.7–3.1)
LYMPHOCYTES NFR BLD AUTO: 36.2 % (ref 19.6–45.3)
MCH RBC QN AUTO: 30.6 PG (ref 26.6–33)
MCHC RBC AUTO-ENTMCNC: 33.4 G/DL (ref 31.5–35.7)
MCV RBC AUTO: 91.5 FL (ref 79–97)
MONOCYTES # BLD AUTO: 0.64 10*3/MM3 (ref 0.1–0.9)
MONOCYTES NFR BLD AUTO: 7.2 % (ref 5–12)
NEUTROPHILS # BLD AUTO: 4.66 10*3/MM3 (ref 1.7–7)
NEUTROPHILS NFR BLD AUTO: 52.1 % (ref 42.7–76)
NITRITE UR QL STRIP: NEGATIVE
NRBC BLD AUTO-RTO: 0 /100 WBC (ref 0–0.2)
PH UR STRIP.AUTO: 5.5 [PH] (ref 5–8)
PLATELET # BLD AUTO: 225 10*3/MM3 (ref 140–450)
PMV BLD AUTO: 9.3 FL (ref 6–12)
POTASSIUM BLD-SCNC: 3.8 MMOL/L (ref 3.5–5.2)
PROT SERPL-MCNC: 6.7 G/DL (ref 6–8.5)
PROT UR QL STRIP: NEGATIVE
RBC # BLD AUTO: 4.71 10*6/MM3 (ref 3.77–5.28)
SODIUM BLD-SCNC: 145 MMOL/L (ref 136–145)
SP GR UR STRIP: 1.01 (ref 1–1.03)
TROPONIN T SERPL-MCNC: <0.01 NG/ML (ref 0–0.03)
UROBILINOGEN UR QL STRIP: NORMAL
WBC NRBC COR # BLD: 8.94 10*3/MM3 (ref 3.4–10.8)

## 2019-07-03 PROCEDURE — 96374 THER/PROPH/DIAG INJ IV PUSH: CPT

## 2019-07-03 PROCEDURE — 74177 CT ABD & PELVIS W/CONTRAST: CPT

## 2019-07-03 PROCEDURE — 84484 ASSAY OF TROPONIN QUANT: CPT | Performed by: EMERGENCY MEDICINE

## 2019-07-03 PROCEDURE — 96375 TX/PRO/DX INJ NEW DRUG ADDON: CPT

## 2019-07-03 PROCEDURE — 25010000002 MORPHINE PER 10 MG: Performed by: EMERGENCY MEDICINE

## 2019-07-03 PROCEDURE — 85025 COMPLETE CBC W/AUTO DIFF WBC: CPT | Performed by: EMERGENCY MEDICINE

## 2019-07-03 PROCEDURE — 83690 ASSAY OF LIPASE: CPT | Performed by: EMERGENCY MEDICINE

## 2019-07-03 PROCEDURE — 25010000002 ONDANSETRON PER 1 MG: Performed by: EMERGENCY MEDICINE

## 2019-07-03 PROCEDURE — 93005 ELECTROCARDIOGRAM TRACING: CPT | Performed by: EMERGENCY MEDICINE

## 2019-07-03 PROCEDURE — 81003 URINALYSIS AUTO W/O SCOPE: CPT | Performed by: EMERGENCY MEDICINE

## 2019-07-03 PROCEDURE — 96361 HYDRATE IV INFUSION ADD-ON: CPT

## 2019-07-03 PROCEDURE — 80053 COMPREHEN METABOLIC PANEL: CPT | Performed by: EMERGENCY MEDICINE

## 2019-07-03 PROCEDURE — 25010000002 IOPAMIDOL 61 % SOLUTION: Performed by: EMERGENCY MEDICINE

## 2019-07-03 PROCEDURE — 99284 EMERGENCY DEPT VISIT MOD MDM: CPT

## 2019-07-03 PROCEDURE — 93010 ELECTROCARDIOGRAM REPORT: CPT | Performed by: INTERNAL MEDICINE

## 2019-07-03 RX ORDER — SODIUM CHLORIDE 0.9 % (FLUSH) 0.9 %
10 SYRINGE (ML) INJECTION AS NEEDED
Status: DISCONTINUED | OUTPATIENT
Start: 2019-07-03 | End: 2019-07-04 | Stop reason: HOSPADM

## 2019-07-03 RX ORDER — MORPHINE SULFATE 2 MG/ML
2 INJECTION, SOLUTION INTRAMUSCULAR; INTRAVENOUS ONCE
Status: COMPLETED | OUTPATIENT
Start: 2019-07-03 | End: 2019-07-03

## 2019-07-03 RX ORDER — ONDANSETRON 2 MG/ML
4 INJECTION INTRAMUSCULAR; INTRAVENOUS ONCE
Status: COMPLETED | OUTPATIENT
Start: 2019-07-03 | End: 2019-07-03

## 2019-07-03 RX ORDER — HYDROCODONE BITARTRATE AND ACETAMINOPHEN 5; 325 MG/1; MG/1
1 TABLET ORAL EVERY 6 HOURS PRN
Qty: 8 TABLET | Refills: 0 | Status: SHIPPED | OUTPATIENT
Start: 2019-07-03 | End: 2020-03-02

## 2019-07-03 RX ADMIN — MORPHINE SULFATE 2 MG: 2 INJECTION, SOLUTION INTRAMUSCULAR; INTRAVENOUS at 20:21

## 2019-07-03 RX ADMIN — ONDANSETRON 4 MG: 2 INJECTION INTRAMUSCULAR; INTRAVENOUS at 20:21

## 2019-07-03 RX ADMIN — IOPAMIDOL 85 ML: 612 INJECTION, SOLUTION INTRAVENOUS at 21:26

## 2019-07-03 RX ADMIN — SODIUM CHLORIDE 1000 ML: 9 INJECTION, SOLUTION INTRAVENOUS at 20:21

## 2019-07-03 NOTE — ED PROVIDER NOTES
" EMERGENCY DEPARTMENT ENCOUNTER    Room Number:  03/03  Date seen:  7/3/2019  Time seen: 7:59 PM  PCP: Hunter Cosby MD  Historian: Patient       HPI:  Chief Complaint: Abdominal Pain   Context: Adele Ly is a 63 y.o. female who presents to the ED c/o LLQ abd pain radiating to LUQ that has been worsening over the past several weeks, exacerbated with deep breathing. Pt confirms lightheadedness, dizziness, and near syncopal episodes today, states these symptoms worsen with positional changes. Pt affirms she has also had nausea and chills, but denies vomiting, diarrhea, constipation, and blood in stool. Pt affirms hx of smoking, HTN, and \"social drinking\" but denies hx of diabetes and recreational drug usage. Per pt, she also has hx of diverticulitis, and states symptoms feel similar to prior events. Pt states she has hx of multiple coloscopies and unspecified \"colon surgeries\".     Pain Location: LLQ  Radiation: to LUQ   Quality: pain   Intensity/Severity: mild to moderate   Duration: several weeks   Onset quality: gradual   Timing: constant   Progression: worsening   Aggravating Factors: deep breathing   Alleviating Factors: none   Previous Episodes: Pt has hx of diverticulitis with same symptoms.   Treatment before arrival: none   Associated Symptoms: nausea, chills, lightheadedness, dizziness, near syncope          PAST MEDICAL HISTORY  Active Ambulatory Problems     Diagnosis Date Noted   • BP (high blood pressure) 03/13/2017   • Pulmonary emphysema (CMS/HCC) 03/13/2017   • Malignant neoplasm of overlapping sites of right breast in female, estrogen receptor positive (CMS/HCC) 12/18/2017   • Tobacco use disorder 03/15/2018   • Migraine without status migrainosus, not intractable 03/15/2018     Resolved Ambulatory Problems     Diagnosis Date Noted   • No Resolved Ambulatory Problems     Past Medical History:   Diagnosis Date   • Arthritis    • Breast cancer (CMS/HCC)    • Colon polyp    • Emphysema of lung " (CMS/HCC)    • Genital herpes    • History of diverticulitis    • History of gastric ulcer    • Hypertension    • Infectious viral hepatitis          PAST SURGICAL HISTORY  Past Surgical History:   Procedure Laterality Date   • BREAST BIOPSY Right 2017    malignant   • COLON RESECTION     • COLOSTOMY     • COLOSTOMY REVISION     • MASTECTOMY WITH SENTINEL NODE BIOPSY AND AXILLARY NODE DISSECTION Right 1/10/2018    Procedure: BREAST MASTECTOMY WITH SENTINEL NODE BIOPSY;  Surgeon: Juan Peterson MD;  Location: [facility];  Service:    • PELVIC LAPAROSCOPY     • TONSILLECTOMY     • WISDOM TOOTH EXTRACTION           FAMILY HISTORY  Family History   Problem Relation Age of Onset   • Diabetes Father    • Hypertension Father    • No Known Problems Mother    • No Known Problems Sister    • No Known Problems Brother    • No Known Problems Daughter    • No Known Problems Son    • Breast cancer Maternal Grandmother 60   • No Known Problems Paternal Grandmother    • Breast cancer Maternal Aunt 60   • Diabetes Paternal Aunt    • Ovarian cancer Paternal Aunt 41   • Colon cancer Maternal Uncle    • COPD Paternal Uncle    • BRCA 1/2 Neg Hx    • Endometrial cancer Neg Hx    • Malig Hyperthermia Neg Hx          SOCIAL HISTORY  Social History     Socioeconomic History   • Marital status:      Spouse name: Not on file   • Number of children: 0   • Years of education: Not on file   • Highest education level: Not on file   Occupational History     Employer: [employer]   Tobacco Use   • Smoking status: Current Every Day Smoker     Packs/day: 1.00     Types: Cigarettes     Start date: 1975   • Smokeless tobacco: Current User   Substance and Sexual Activity   • Alcohol use: Yes     Comment: COUPLE DAYS A WEEK , BEER   • Drug use: No   • Sexual activity: Defer     Comment: single         ALLERGIES  Penicillins        REVIEW OF SYSTEMS  Review of Systems   Constitutional: Positive for chills. Negative for diaphoresis  "and fever.   HENT: Negative for congestion.    Eyes: Negative for visual disturbance.   Respiratory: Negative for shortness of breath.    Cardiovascular: Negative for palpitations.   Gastrointestinal: Positive for abdominal pain (LLQ to LUQ) and nausea. Negative for anal bleeding, blood in stool, constipation, diarrhea and vomiting.   Endocrine: Negative for polyuria.   Genitourinary: Negative for flank pain.   Musculoskeletal: Negative for joint swelling.   Skin: Negative for wound.   Neurological: Positive for dizziness, syncope (\"near\") and light-headedness. Negative for seizures.   Hematological: Negative for adenopathy.   Psychiatric/Behavioral: Negative for sleep disturbance.            PHYSICAL EXAM  ED Triage Vitals   Temp Heart Rate Resp BP SpO2   07/03/19 1857 07/03/19 1857 07/03/19 1857 07/03/19 1950 07/03/19 1857   97.9 °F (36.6 °C) 85 16 138/93 96 %      Temp src Heart Rate Source Patient Position BP Location FiO2 (%)   07/03/19 1857 07/03/19 1857 -- -- --   Tympanic Monitor            GENERAL: no acute distress  HENT: nares patent  EYES: no scleral icterus  CV: regular rhythm, normal rate, no murmur  RESPIRATORY: normal effort, CTAB  ABDOMEN: soft, moderate tenderness LUQ and LLQ, no rebound or guarding  MUSCULOSKELETAL: no deformity  NEURO: alert, moves all extremities, follows commands  SKIN: warm, dry    Vital signs and nursing notes reviewed.          LAB RESULTS  Recent Results (from the past 24 hour(s))   Comprehensive Metabolic Panel    Collection Time: 07/03/19  8:00 PM   Result Value Ref Range    Glucose 115 (H) 65 - 99 mg/dL    BUN 15 8 - 23 mg/dL    Creatinine 0.71 0.57 - 1.00 mg/dL    Sodium 145 136 - 145 mmol/L    Potassium 3.8 3.5 - 5.2 mmol/L    Chloride 110 (H) 98 - 107 mmol/L    CO2 20.9 (L) 22.0 - 29.0 mmol/L    Calcium 9.6 8.6 - 10.5 mg/dL    Total Protein 6.7 6.0 - 8.5 g/dL    Albumin 4.10 3.50 - 5.20 g/dL    ALT (SGPT) 6 1 - 33 U/L    AST (SGOT) 12 1 - 32 U/L    Alkaline " Phosphatase 63 39 - 117 U/L    Total Bilirubin 0.2 0.2 - 1.2 mg/dL    eGFR Non African Amer 84 >60 mL/min/1.73    Globulin 2.6 gm/dL    A/G Ratio 1.6 g/dL    BUN/Creatinine Ratio 21.1 7.0 - 25.0    Anion Gap 14.1 5.0 - 15.0 mmol/L   Lipase    Collection Time: 07/03/19  8:00 PM   Result Value Ref Range    Lipase 57 13 - 60 U/L   Urinalysis With Microscopic If Indicated (No Culture) - Urine, Clean Catch    Collection Time: 07/03/19  8:00 PM   Result Value Ref Range    Color, UA Yellow Yellow, Straw    Appearance, UA Clear Clear    pH, UA 5.5 5.0 - 8.0    Specific Gravity, UA 1.014 1.005 - 1.030    Glucose, UA Negative Negative    Ketones, UA Negative Negative    Bilirubin, UA Negative Negative    Blood, UA Negative Negative    Protein, UA Negative Negative    Leuk Esterase, UA Negative Negative    Nitrite, UA Negative Negative    Urobilinogen, UA 0.2 E.U./dL 0.2 - 1.0 E.U./dL   CBC Auto Differential    Collection Time: 07/03/19  8:00 PM   Result Value Ref Range    WBC 8.94 3.40 - 10.80 10*3/mm3    RBC 4.71 3.77 - 5.28 10*6/mm3    Hemoglobin 14.4 12.0 - 15.9 g/dL    Hematocrit 43.1 34.0 - 46.6 %    MCV 91.5 79.0 - 97.0 fL    MCH 30.6 26.6 - 33.0 pg    MCHC 33.4 31.5 - 35.7 g/dL    RDW 12.7 12.3 - 15.4 %    RDW-SD 42.3 37.0 - 54.0 fl    MPV 9.3 6.0 - 12.0 fL    Platelets 225 140 - 450 10*3/mm3    Neutrophil % 52.1 42.7 - 76.0 %    Lymphocyte % 36.2 19.6 - 45.3 %    Monocyte % 7.2 5.0 - 12.0 %    Eosinophil % 3.6 0.3 - 6.2 %    Basophil % 0.6 0.0 - 1.5 %    Immature Grans % 0.3 0.0 - 0.5 %    Neutrophils, Absolute 4.66 1.70 - 7.00 10*3/mm3    Lymphocytes, Absolute 3.24 (H) 0.70 - 3.10 10*3/mm3    Monocytes, Absolute 0.64 0.10 - 0.90 10*3/mm3    Eosinophils, Absolute 0.32 0.00 - 0.40 10*3/mm3    Basophils, Absolute 0.05 0.00 - 0.20 10*3/mm3    Immature Grans, Absolute 0.03 0.00 - 0.05 10*3/mm3    nRBC 0.0 0.0 - 0.2 /100 WBC   Troponin    Collection Time: 07/03/19  8:00 PM   Result Value Ref Range    Troponin T <0.010  0.000 - 0.030 ng/mL       Ordered the above labs and reviewed the results.        RADIOLOGY  Ct Abdomen Pelvis With Contrast    Result Date: 7/3/2019  CT OF THE ABDOMEN AND PELVIS WITH CONTRAST  HISTORY: Left upper quadrant and left lower quadrant pain  COMPARISON: 12/19/2015  TECHNIQUE: Axial CT imaging was obtained from the dome the diaphragm to the symphysis pubis. IV contrast was administered.  FINDINGS: Images through the lung bases demonstrate some mild left basilar atelectasis. The stomach and proximal small bowel appear unremarkable. No focal hepatic lesions are seen. The gallbladder is contracted. The pancreas is within normal limits. There are small bilateral renal cysts. In addition, a larger exophytic cyst is seen arising from the superior pole of the right kidney. This measures up to 5.8 x 4.4 cm. This actually appears smaller than on prior study. Punctate nonobstructing stone is seen within the inferior pole of the left kidney. This measures about 2 mm in size. There is atherosclerotic involvement of the abdominal aorta. No free fluid or adenopathy is seen in the abdomen. There is no evidence of mechanical bowel obstruction. The urinary bladder appears normal. There is colonic diverticulosis. I don't see convincing evidence of diverticulitis on the current study. The appendix is visualized, and is within normal limits. There is an apparent left adnexal mass. I think it is arising from the left ovary, rather than the uterus. This measures up to 2 6.2 cm on the axial series. Previously it measured 4.2 cm in December 2015. It measures up to 4.3 x 4.1 cm on the coronal series. Previously, it measured 3.0 x 2.3 cm. This has been previously evaluated with pelvic ultrasound. At that time, the appearance was felt to be secondary to the presence of 2 simple cysts. However, given interval enlargement, further evaluation with pelvic ultrasound is suggested. No free fluid or adenopathy is seen within the pelvis.  No aggressive osseous abnormalities are seen.       1. The patient has an enlarging left adnexal lesion. It was previously evaluated with pelvic ultrasound, and was felt to reflect 2 simple ovarian cysts. However, given complaints of left-sided pain, as well as interval enlargement, further evaluation with pelvic ultrasound is recommended. 2. Diverticulosis, without evidence of diverticulitis.  Radiation dose reduction techniques were utilized, including automated exposure control and exposure modulation based on body size.  This report was finalized on 7/3/2019 10:05 PM by Dr. Gladys Vargas M.D.        Ordered the above noted radiological studies. Reviewed by me in PACS.     PROCEDURES  Procedures        EKG:           EKG time: 2017  Rhythm/Rate: NSR 82  P waves and MN: nml  QRS, axis: LAD    ST and T waves: No acute ischemic changes      Interpreted Contemporaneously by me, independently viewed  Unchanged compared to prior 1/4/18              MEDICATIONS GIVEN IN ER  Medications   sodium chloride 0.9 % flush 10 mL (not administered)   sodium chloride 0.9 % bolus 1,000 mL (1,000 mL Intravenous New Bag 7/3/19 2021)   morphine injection 2 mg (2 mg Intravenous Given 7/3/19 2021)   ondansetron (ZOFRAN) injection 4 mg (4 mg Intravenous Given 7/3/19 2021)   iopamidol (ISOVUE-300) 61 % injection 100 mL (85 mL Intravenous Given by Other 7/3/19 2126)                   PROGRESS AND CONSULTS     2004: Upon pt exam, discussed plan for labs and CT abd/pelvis in ED for rule out of diverticulitis, possible bowel perforation, or any other emergent findings. Offered pt pain medication and antiemetics at this time, which pt agreed with.     2005: Zofran and morphine ordered for pain management. Labs and CT Abd/Pelvis ordered for further evaluation.     2235: Pt rechecked and resting comfortably. Discussed negative acute findings of labs and normal orthostatics in ED, with evidence of L adnexal lesion seen on CT scan, but no  diverticulitis. Pt directed to follow up outpatient with primary care or GYN for re-evaluation of mass and schedule of outpatient US. Pt directed to follow up with GI for persistent abd pain. Agreed to give pt several [place] upon discharge for pain management.  Pt expressed concern for lightheadedness, and I discussed possibility of symptoms due to dehydration due to pt's improvement with fluids in ED. I directed pt to return to ED if pt experiences a syncopal episode. Pt understands and agrees with the plan, all questions answered.      MEDICAL DECISION MAKING    63-year-old female presents with complaint of feeling lightheaded at times and also 2 to 4 weeks of left lower abdominal pain.  She reports prior history of diverticulitis.  CT of the abdomen pelvis is negative for diverticulitis or colitis today.  There is an enlarging left adnexal mass or cystic lesion compared to previous ultrasound in her chart.  Labs are otherwise reassuring today.  She was advised that she will need follow-up with her PCP or gynecologist for outpatient ultrasound of this.  She was given a short course of Norco PRN for pain.  Return precautions were discussed.  Her EKG was normal, troponin is negative.  She denies chest pain.  There is no clear etiology for her intermittent lightheadedness.  She has been normotensive, with normal neurologic exam.    MDM  Number of Diagnoses or Management Options     Amount and/or Complexity of Data Reviewed  Clinical lab tests: ordered and reviewed (Unremarkable labs)  Tests in the radiology section of CPT®: ordered and reviewed (CT - enlarging L adnexal lesion - recommended US outpatient, diverticulosis no diverticulitis )  Tests in the medicine section of CPT®: ordered and reviewed (See note)               DIAGNOSIS  Final diagnoses:   Lightheadedness   Left lower quadrant pain   Pelvic mass         DISPOSITION  DISCHARGE    Patient discharged in stable condition.    Reviewed implications of results,  diagnosis, meds, responsibility to follow up, warning signs and symptoms of possible worsening, potential complications and reasons to return to ER.    Patient/Family voiced understanding of above instructions.    Discussed plan for discharge, as there is no emergent indication for admission. Patient referred to primary care provider for BP management due to today's BP. Pt/family is agreeable and understands need for follow up and repeat testing.  Pt is aware that discharge does not mean that nothing is wrong but it indicates no emergency is present that requires admission and they must continue care with follow-up as given below or physician of their choice.     FOLLOW-UP  Hunter Cosby MD  [address] [facility] [id number] [phone]    Schedule an appointment as soon as possible for a visit            Medication List      New Prescriptions    HYDROcodone-acetaminophen 5-325 MG per tablet  Commonly known as:  NORCO  Take 1 tablet by mouth Every 6 (Six) Hours As Needed for Moderate Pain .                      Latest Documented Vital Signs:  As of 10:39 PM  BP- 163/80 HR- 84 Temp- 97.9 °F (36.6 °C) (Tympanic) O2 sat- 95%        --  Documentation assistance provided by samanta Gilliland for Dr. REZA Judge MD.  Information recorded by the samanta was done at my direction and has been verified and validated by me.      Please note that portions of this were completed with a voice recognition program.                Dayan Gilliland  07/03/19 2244       Javon Judge MD  07/04/19 0019

## 2019-07-04 NOTE — DISCHARGE INSTRUCTIONS
Please follow up with your PCP or gynecologist for a pelvic ultrasound for further evaluation of pelvic mass seen on CT scan today.

## 2019-07-10 ENCOUNTER — OFFICE VISIT (OUTPATIENT)
Dept: OBSTETRICS AND GYNECOLOGY | Age: 64
End: 2019-07-10

## 2019-07-10 ENCOUNTER — PROCEDURE VISIT (OUTPATIENT)
Dept: OBSTETRICS AND GYNECOLOGY | Age: 64
End: 2019-07-10

## 2019-07-10 VITALS
DIASTOLIC BLOOD PRESSURE: 68 MMHG | BODY MASS INDEX: 25.01 KG/M2 | SYSTOLIC BLOOD PRESSURE: 118 MMHG | WEIGHT: 165 LBS | HEIGHT: 68 IN

## 2019-07-10 DIAGNOSIS — N83.8 OVARIAN MASS, LEFT: Primary | ICD-10-CM

## 2019-07-10 DIAGNOSIS — Z80.3 FAMILY HISTORY OF BREAST CANCER: ICD-10-CM

## 2019-07-10 DIAGNOSIS — N83.202 CYST OF LEFT OVARY: Primary | ICD-10-CM

## 2019-07-10 DIAGNOSIS — Z85.3 PERSONAL HISTORY OF BREAST CANCER: ICD-10-CM

## 2019-07-10 PROCEDURE — 76830 TRANSVAGINAL US NON-OB: CPT | Performed by: OBSTETRICS & GYNECOLOGY

## 2019-07-10 PROCEDURE — 99213 OFFICE O/P EST LOW 20 MIN: CPT | Performed by: PHYSICIAN ASSISTANT

## 2019-07-10 NOTE — PROGRESS NOTES
"Subjective     Chief Complaint   Patient presents with   • Gynecologic Exam     follow up from er, ct scan showed cysts.         Adele Ly is a 63 y.o.  whose LMP is No LMP recorded. Patient is postmenopausal. presents for f/u of left ovarian cyst found on CT scan at the ER on the     She has a h/o cyst on the left ovary that has been seen on imaging back in   At the time they thought there were 2 cysts with the largest one measuring around 2cm  seh has a peronsal h/o breast cancer, dxed in 2017  She had a mastectomy and was on meds for a little while but stopped them on her own d/t SE  She has been offered genetics testing but has declined in the past d/t cost    Pt of dr bartholomew  New to me with this concern    Pt has family h/o ovarian cancer in her paternal aunt  Unsure age, maybe in her 40's      No Additional Complaints Reported    The following portions of the patient's history were reviewed and updated as appropriate:vital signs, allergies, current medications, past family history, past medical history, past social history, past surgical history and problem list      Review of Systems   Genitourinary:positive for pelvic testing     Objective      /68   Ht 172.7 cm (68\")   Wt 74.8 kg (165 lb)   Breastfeeding? No   BMI 25.09 kg/m²     Physical Exam    General:   alert, comfortable and no distress   Heart: Not performed today   Lungs: Not performed today.   Breast: Not performed today   Neck: na   Abdomen: {Not performed today   CVA: Not performed today   Pelvis: Not performed today   Extremities: Not performed today   Neurologic: negative   Psychiatric: Normal affect, judgement, and mood       Lab Review   Labs: Urinalysis - with micro, cbc and cmp     Imaging   Ultrasound - Pelvic Vaginal  Endo thickness measures 2.52 mm. 4 follicles on the left ovary that measure 3.0 x 2.6, 1.7 x 1.3, 1.2 x 1.0 and 1.2 x .8 cm.  Right follicle that measures.7 x .8 cm. 3 fibroids measuring " 1.4 x 1.0, 1.7 x 1.3 and 1.2 x 1.0 cm.  The left ovary has ovarian stroma, very irregular, echogenic with minimal vascular flow, apparent calcification?    Assessment/Plan     ASSESSMENT  1. Ovarian mass, left    2. Family history of breast cancer    3. Personal history of breast cancer        PLAN  1.   Orders Placed This Encounter   Procedures   •    • [facility] Hereditary Cancer Screen       2. Disc with pt that the findings are rather unusual and that further w/u is warranted. Will start with a  , cbc and cmp done on the 3rd were wnl.  Plan c/w Dr Taylor as well to determine how she would like the pt to f/u and when.  Also, will move forward with genetic testing    Follow up: 4 week(s)    NIC Johnson  7/10/2019

## 2019-07-11 DIAGNOSIS — N83.292 COMPLEX CYST OF LEFT OVARY: Primary | ICD-10-CM

## 2019-07-11 LAB — CANCER AG125 SERPL-ACNC: 12.9 U/ML (ref 0–38.1)

## 2019-07-16 ENCOUNTER — TELEPHONE (OUTPATIENT)
Dept: OBSTETRICS AND GYNECOLOGY | Age: 64
End: 2019-07-16

## 2019-07-16 NOTE — TELEPHONE ENCOUNTER
Schedule an appt with me next week to discuss with US next Tuesday ,  We can look at cyst again and discuss options

## 2019-07-16 NOTE — TELEPHONE ENCOUNTER
S/W PATIENT SHE WANTS TO WAIT UNTIL AFTER THE FIRST OF THE YEAR TO HAVE SURGERY BECAUSE SHE IS NOT GOING TO HAVE THE TIME TO TAKE OFF WORK.  SHE WOULD ALSO LIKE TO HAVE AN APPOINTMENT WITH DR. SHEPARD PRIOR TO SCHEDULING SURGERY TO DISCUSS THE SURGERY FURTHER WITH HER.

## 2019-07-26 ENCOUNTER — TELEPHONE (OUTPATIENT)
Dept: OBSTETRICS AND GYNECOLOGY | Age: 64
End: 2019-07-26

## 2019-07-26 NOTE — TELEPHONE ENCOUNTER
Left message    Her MyRisk gene is negative.  We will mail a copy of the results to you.  If you have any questions let us know.

## 2019-09-26 RX ORDER — LISINOPRIL 20 MG/1
TABLET ORAL
Qty: 30 TABLET | Refills: 1 | Status: SHIPPED | OUTPATIENT
Start: 2019-09-26 | End: 2019-11-25 | Stop reason: SDUPTHER

## 2019-10-14 RX ORDER — AMLODIPINE BESYLATE 10 MG/1
TABLET ORAL
Qty: 30 TABLET | Refills: 0 | Status: SHIPPED | OUTPATIENT
Start: 2019-10-14 | End: 2019-10-28 | Stop reason: SDUPTHER

## 2019-10-28 ENCOUNTER — PROCEDURE VISIT (OUTPATIENT)
Dept: OBSTETRICS AND GYNECOLOGY | Age: 64
End: 2019-10-28

## 2019-10-28 ENCOUNTER — OFFICE VISIT (OUTPATIENT)
Dept: OBSTETRICS AND GYNECOLOGY | Age: 64
End: 2019-10-28

## 2019-10-28 VITALS
WEIGHT: 162 LBS | SYSTOLIC BLOOD PRESSURE: 134 MMHG | DIASTOLIC BLOOD PRESSURE: 90 MMHG | BODY MASS INDEX: 24.55 KG/M2 | HEIGHT: 68 IN

## 2019-10-28 DIAGNOSIS — N95.1 MENOPAUSAL SYMPTOMS: ICD-10-CM

## 2019-10-28 DIAGNOSIS — N83.202 CYSTS OF BOTH OVARIES: Primary | ICD-10-CM

## 2019-10-28 DIAGNOSIS — N83.201 CYSTS OF BOTH OVARIES: Primary | ICD-10-CM

## 2019-10-28 DIAGNOSIS — N83.202 OVARIAN CYST, LEFT: ICD-10-CM

## 2019-10-28 DIAGNOSIS — Z01.419 ENCOUNTER FOR GYNECOLOGICAL EXAMINATION (GENERAL) (ROUTINE) WITHOUT ABNORMAL FINDINGS: Primary | ICD-10-CM

## 2019-10-28 PROCEDURE — 99396 PREV VISIT EST AGE 40-64: CPT | Performed by: OBSTETRICS & GYNECOLOGY

## 2019-10-28 PROCEDURE — 76830 TRANSVAGINAL US NON-OB: CPT | Performed by: OBSTETRICS & GYNECOLOGY

## 2019-10-28 RX ORDER — SULFAMETHOXAZOLE AND TRIMETHOPRIM 800; 160 MG/1; MG/1
TABLET ORAL
COMMUNITY
Start: 2019-08-21 | End: 2020-03-02

## 2019-10-28 RX ORDER — PREDNISOLONE ACETATE 10 MG/ML
SUSPENSION/ DROPS OPHTHALMIC
COMMUNITY
Start: 2019-09-19 | End: 2021-02-24

## 2019-10-29 ENCOUNTER — TELEPHONE (OUTPATIENT)
Dept: OBSTETRICS AND GYNECOLOGY | Age: 64
End: 2019-10-29

## 2019-10-29 LAB — CANCER AG125 SERPL-ACNC: 13.2 U/ML (ref 0–38.1)

## 2019-10-29 NOTE — TELEPHONE ENCOUNTER
----- Message from Caroline Brown MA sent at 10/29/2019 12:01 PM EDT -----      ----- Message -----  From: Arianne Taylor MD  Sent: 10/29/2019   9:52 AM  To: Barbara Bright MA    Notify negative and stable from last visit

## 2019-11-12 RX ORDER — AMLODIPINE BESYLATE 10 MG/1
TABLET ORAL
Qty: 30 TABLET | Refills: 0 | Status: SHIPPED | OUTPATIENT
Start: 2019-11-12 | End: 2019-12-10 | Stop reason: SDUPTHER

## 2019-11-26 RX ORDER — LISINOPRIL 20 MG/1
TABLET ORAL
Qty: 30 TABLET | Refills: 0 | Status: SHIPPED | OUTPATIENT
Start: 2019-11-26 | End: 2019-12-24

## 2019-12-10 RX ORDER — AMLODIPINE BESYLATE 10 MG/1
TABLET ORAL
Qty: 30 TABLET | Refills: 0 | Status: SHIPPED | OUTPATIENT
Start: 2019-12-10 | End: 2020-01-08

## 2019-12-24 RX ORDER — LISINOPRIL 20 MG/1
TABLET ORAL
Qty: 30 TABLET | Refills: 0 | Status: SHIPPED | OUTPATIENT
Start: 2019-12-24 | End: 2020-01-27

## 2020-01-08 RX ORDER — AMLODIPINE BESYLATE 10 MG/1
TABLET ORAL
Qty: 30 TABLET | Refills: 0 | Status: SHIPPED | OUTPATIENT
Start: 2020-01-08 | End: 2021-02-24 | Stop reason: SDUPTHER

## 2020-01-17 ENCOUNTER — TELEPHONE (OUTPATIENT)
Dept: MAMMOGRAPHY | Facility: CLINIC | Age: 65
End: 2020-01-17

## 2020-01-17 DIAGNOSIS — Z90.11 HISTORY OF RIGHT MASTECTOMY: ICD-10-CM

## 2020-01-17 DIAGNOSIS — Z17.0 MALIGNANT NEOPLASM OF OVERLAPPING SITES OF RIGHT BREAST IN FEMALE, ESTROGEN RECEPTOR POSITIVE (HCC): Primary | ICD-10-CM

## 2020-01-17 DIAGNOSIS — C50.811 MALIGNANT NEOPLASM OF OVERLAPPING SITES OF RIGHT BREAST IN FEMALE, ESTROGEN RECEPTOR POSITIVE (HCC): Primary | ICD-10-CM

## 2020-01-17 NOTE — TELEPHONE ENCOUNTER
Pt requests that Dr Garza be sent her genetics report needed to assist in the care of a family member.  This is to serve as documentation, genetics report released to Dr. Garza.

## 2020-01-27 RX ORDER — LISINOPRIL 20 MG/1
TABLET ORAL
Qty: 30 TABLET | Refills: 0 | Status: SHIPPED | OUTPATIENT
Start: 2020-01-27 | End: 2020-02-26 | Stop reason: SDUPTHER

## 2020-01-27 RX ORDER — OMEPRAZOLE 20 MG/1
CAPSULE, DELAYED RELEASE ORAL
Qty: 30 CAPSULE | Refills: 0 | Status: SHIPPED | OUTPATIENT
Start: 2020-01-27 | End: 2020-02-26 | Stop reason: SDUPTHER

## 2020-02-05 ENCOUNTER — TELEPHONE (OUTPATIENT)
Dept: OBSTETRICS AND GYNECOLOGY | Age: 65
End: 2020-02-05

## 2020-02-05 NOTE — TELEPHONE ENCOUNTER
Received call from Dr. Taylor patient. She's requesting a call back, needing to know the name of the company Dr. Taylor uses for  Genetic testing for history of breast cancer.

## 2020-02-05 NOTE — TELEPHONE ENCOUNTER
Pt returned John's call states she wanted phone # of Co. For genetic testing, will look up on Internet. Wanted to know if ins paid for it.  Advised to contact ins co.

## 2020-02-25 ENCOUNTER — TELEPHONE (OUTPATIENT)
Dept: FAMILY MEDICINE CLINIC | Facility: CLINIC | Age: 65
End: 2020-02-25

## 2020-02-25 RX ORDER — LISINOPRIL 20 MG/1
TABLET ORAL
Qty: 30 TABLET | Refills: 0 | OUTPATIENT
Start: 2020-02-25

## 2020-02-25 RX ORDER — OMEPRAZOLE 20 MG/1
CAPSULE, DELAYED RELEASE ORAL
Qty: 30 CAPSULE | Refills: 0 | OUTPATIENT
Start: 2020-02-25

## 2020-02-25 NOTE — TELEPHONE ENCOUNTER
PT CALLED IN FU ON SCRIPT REFILLS.  I ADVISED PT SHE WAS PAST DUE FOR AN APPOINTMENT.  PT SCHEDULED AN APPOINTMENT FOR Monday 3/2/20 AT 4:00 PM.  PT IS REQUESTING DR SEND A SCRIPT FOR AT LEAST ENOUGH TO GET HER TO HER APPT SINCE SHE IS ALMOST COMPLETELY OUT OF MEDICATION.  SHE ADVISED SHE ONLY HAS A DAY OR TWO OF MEDICATION LEFT.    PT IS REQUESTING A REFILL ON    Omeprazole 20 MG TAKE ONE CAPSULE BY MOUTH DAILY    Lisinopril 20 MG TAKE ONE TABLET BY MOUTH DAILY     PLEASE SEND TO [facility] [address] AND [facility].    PLEASE CONTACT PT TO ADVISE [phone]

## 2020-02-26 RX ORDER — LISINOPRIL 20 MG/1
20 TABLET ORAL DAILY
Qty: 30 TABLET | Refills: 0 | Status: SHIPPED | OUTPATIENT
Start: 2020-02-26 | End: 2020-03-02 | Stop reason: SDUPTHER

## 2020-02-26 RX ORDER — OMEPRAZOLE 20 MG/1
20 CAPSULE, DELAYED RELEASE ORAL DAILY
Qty: 30 CAPSULE | Refills: 0 | Status: SHIPPED | OUTPATIENT
Start: 2020-02-26 | End: 2020-03-02 | Stop reason: SDUPTHER

## 2020-03-02 ENCOUNTER — OFFICE VISIT (OUTPATIENT)
Dept: FAMILY MEDICINE CLINIC | Facility: CLINIC | Age: 65
End: 2020-03-02

## 2020-03-02 VITALS
HEART RATE: 74 BPM | OXYGEN SATURATION: 99 % | BODY MASS INDEX: 24.89 KG/M2 | TEMPERATURE: 98.1 F | SYSTOLIC BLOOD PRESSURE: 130 MMHG | HEIGHT: 68 IN | DIASTOLIC BLOOD PRESSURE: 84 MMHG | WEIGHT: 164.2 LBS | RESPIRATION RATE: 18 BRPM

## 2020-03-02 DIAGNOSIS — F17.200 TOBACCO USE DISORDER: ICD-10-CM

## 2020-03-02 DIAGNOSIS — I10 ESSENTIAL HYPERTENSION: Primary | ICD-10-CM

## 2020-03-02 DIAGNOSIS — J43.9 PULMONARY EMPHYSEMA, UNSPECIFIED EMPHYSEMA TYPE (HCC): ICD-10-CM

## 2020-03-02 PROCEDURE — 99214 OFFICE O/P EST MOD 30 MIN: CPT | Performed by: INTERNAL MEDICINE

## 2020-03-02 RX ORDER — OMEPRAZOLE 20 MG/1
20 CAPSULE, DELAYED RELEASE ORAL DAILY
Qty: 30 CAPSULE | Refills: 5 | Status: SHIPPED | OUTPATIENT
Start: 2020-03-02 | End: 2020-09-21

## 2020-03-02 RX ORDER — LISINOPRIL 20 MG/1
20 TABLET ORAL DAILY
Qty: 30 TABLET | Refills: 5 | Status: SHIPPED | OUTPATIENT
Start: 2020-03-02 | End: 2021-02-24

## 2020-03-02 RX ORDER — AMLODIPINE BESYLATE 10 MG/1
10 TABLET ORAL DAILY
Qty: 30 TABLET | Refills: 4 | Status: SHIPPED | OUTPATIENT
Start: 2020-03-02 | End: 2020-09-21

## 2020-03-03 NOTE — PROGRESS NOTES
Subjective   Adele Ly is a 64 y.o. female. Patient is here today for   Chief Complaint   Patient presents with   • Hypertension   • Heartburn   • Migraine          Vitals:    03/02/20 1553   BP: 130/84   Pulse: 74   Resp: 18   Temp: 98.1 °F (36.7 °C)   SpO2: 99%     Body mass index is 24.97 kg/m².      Past Medical History:   Diagnosis Date   • Arthritis    • Breast cancer (CMS/HCC)     Right, Stage IA   • Colon polyp    • Emphysema of lung (CMS/HCC)    • Genital herpes    • History of diverticulitis    • History of gastric ulcer    • Hypertension    • Infectious viral hepatitis     B      Allergies   Allergen Reactions   • Penicillins Swelling     THROAT SWELLS      Social History     Socioeconomic History   • Marital status:      Spouse name: Not on file   • Number of children: 0   • Years of education: Not on file   • Highest education level: Not on file   Occupational History     Employer: [employer]   Tobacco Use   • Smoking status: Current Every Day Smoker     Packs/day: 1.00     Types: Cigarettes     Start date: 1975   • Smokeless tobacco: Current User   Substance and Sexual Activity   • Alcohol use: Yes     Comment: COUPLE DAYS A WEEK , BEER   • Drug use: No   • Sexual activity: Defer     Birth control/protection: Post-menopausal        Current Outpatient Medications:   •  acyclovir (ZOVIRAX) 400 MG tablet, Take 1 tablet by mouth Daily. Take no more than 5 doses a day., Disp: 30 tablet, Rfl: 12  •  amLODIPine (NORVASC) 10 MG tablet, Take 1 tablet by mouth Daily., Disp: 30 tablet, Rfl: 4  •  Apremilast (OTEZLA PO), Take 1 tablet/day by mouth 2 (Two) Times a Day., Disp: , Rfl:   •  ibuprofen (ADVIL,MOTRIN) 200 MG tablet, Take 200 mg by mouth Every 6 (Six) Hours As Needed. PT HOLDING FOR SURGERY, Disp: , Rfl:   •  lisinopril (PRINIVIL,ZESTRIL) 20 MG tablet, Take 1 tablet by mouth Daily., Disp: 30 tablet, Rfl: 5  •  omeprazole (priLOSEC) 20 MG capsule, Take 1 capsule by mouth Daily., Disp: 30  capsule, Rfl: 5  •  amLODIPine (NORVASC) 10 MG tablet, TAKE ONE TABLET BY MOUTH DAILY, Disp: 30 tablet, Rfl: 0  •  fluticasone-salmeterol (ADVAIR) 250-50 MCG/DOSE DISKUS, Inhale 1 puff 2 (Two) Times a Day., Disp: 60 each, Rfl: 5  •  prednisoLONE acetate (PRED FORTE) 1 % ophthalmic suspension, , Disp: , Rfl:   •  SUMAtriptan (IMITREX) 50 MG tablet, Take one tablet at onset of headache. May repeat dose one time in 2 hours if headache not relieved., Disp: 9 tablet, Rfl: 2     Objective     This pleasant patient is here to follow-up on hypertension.    She tells me that she feels well although she continues to smoke cigarettes.  She inquired about whether or not we had any inhaler samples that she might use.  She has found inhaled long-acting beta agonist and steroids have worked fairly well to improve her sense of dyspnea.    She has a history of breast cancer.       Review of Systems   Constitutional: Negative.    HENT: Negative.    Respiratory: Negative.    Cardiovascular: Negative.    Musculoskeletal: Negative.    Psychiatric/Behavioral: Negative.        Physical Exam   Constitutional: She is oriented to person, place, and time. She appears well-developed and well-nourished.   Pleasant, neatly groomed, BMI 25.   HENT:   Head: Normocephalic and atraumatic.   Cardiovascular: Normal rate, regular rhythm and normal heart sounds.   Pulmonary/Chest: Effort normal and breath sounds normal.   Neurological: She is alert and oriented to person, place, and time.   Psychiatric: She has a normal mood and affect. Her behavior is normal. Thought content normal.   Nursing note and vitals reviewed.        Problem List Items Addressed This Visit        Cardiovascular and Mediastinum    BP (high blood pressure) - Primary    Relevant Medications    amLODIPine (NORVASC) 10 MG tablet    lisinopril (PRINIVIL,ZESTRIL) 20 MG tablet       Respiratory    Pulmonary emphysema (CMS/HCC)    Relevant Medications    fluticasone-salmeterol (ADVAIR)  250-50 MCG/DOSE DISKUS       Other    Tobacco use disorder            PLAN  She and I reviewed her oncologists last comment about her (Rickie October 18, 2018).  I asked the patient to follow-up with oncology.     Her hypertension appears to be relatively well controlled.    She does have pulmonary emphysema.  I have discussed smoking cessation with the patient for greater than 3 minutes today.    I sent out a prescription for Advair Diskus 250/50 1 inhalation daily.    I asked her to follow-up in 6 months.  She should follow-up for a comprehensive physical exam once daily.  No follow-ups on file.

## 2020-05-26 RX ORDER — ACYCLOVIR 400 MG/1
TABLET ORAL
Qty: 30 TABLET | Refills: 11 | Status: SHIPPED | OUTPATIENT
Start: 2020-05-26 | End: 2021-05-24

## 2020-05-27 ENCOUNTER — TELEPHONE (OUTPATIENT)
Dept: FAMILY MEDICINE CLINIC | Facility: CLINIC | Age: 65
End: 2020-05-27

## 2020-05-27 NOTE — TELEPHONE ENCOUNTER
PT FEELS LIKE SHE HAS AN INNER EAR INFECTION ACCOMPANIED BY A SORE THROAT. SHE OCCASIONALLY LOSES HER VOICE. PT HAS NO OTHER SYMPTOMS AND WANTS TO COME IN AND GET CHECKED. WANTED TO KNOW IF THIS WAS OKAY? PLEASE ADVISE.

## 2020-05-29 NOTE — TELEPHONE ENCOUNTER
"Lmtcb. Per Dr. Cosby \" Patient needs an appointment. She can do Televisit or an office visit. Whichever she prefers.  "

## 2020-06-24 ENCOUNTER — TRANSCRIBE ORDERS (OUTPATIENT)
Dept: LAB | Facility: HOSPITAL | Age: 65
End: 2020-06-24

## 2020-06-24 ENCOUNTER — HOSPITAL ENCOUNTER (OUTPATIENT)
Dept: CARDIOLOGY | Facility: HOSPITAL | Age: 65
Discharge: HOME | End: 2020-06-24
Admitting: OTOLARYNGOLOGY

## 2020-06-24 ENCOUNTER — TRANSCRIBE ORDERS (OUTPATIENT)
Dept: CARDIOLOGY | Facility: HOSPITAL | Age: 65
End: 2020-06-24

## 2020-06-24 ENCOUNTER — LAB (OUTPATIENT)
Dept: LAB | Facility: HOSPITAL | Age: 65
End: 2020-06-24

## 2020-06-24 DIAGNOSIS — H92.01 OTALGIA OF RIGHT EAR: ICD-10-CM

## 2020-06-24 DIAGNOSIS — R49.0 HOARSENESS: ICD-10-CM

## 2020-06-24 DIAGNOSIS — Z01.818 PRE-OP TESTING: ICD-10-CM

## 2020-06-24 DIAGNOSIS — J38.00 PARALYSIS OF LARYNX: ICD-10-CM

## 2020-06-24 DIAGNOSIS — J38.00 PARALYSIS OF LARYNX: Primary | ICD-10-CM

## 2020-06-24 LAB
ANION GAP SERPL CALCULATED.3IONS-SCNC: 11.4 MMOL/L (ref 5–15)
BUN BLD-MCNC: 12 MG/DL (ref 8–23)
BUN/CREAT SERPL: 16.9 (ref 7–25)
CALCIUM SPEC-SCNC: 10.2 MG/DL (ref 8.6–10.5)
CHLORIDE SERPL-SCNC: 100 MMOL/L (ref 98–107)
CO2 SERPL-SCNC: 24.6 MMOL/L (ref 22–29)
CREAT BLD-MCNC: 0.71 MG/DL (ref 0.57–1)
DEPRECATED RDW RBC AUTO: 41.7 FL (ref 37–54)
ERYTHROCYTE [DISTWIDTH] IN BLOOD BY AUTOMATED COUNT: 12.5 % (ref 12.3–15.4)
GFR SERPL CREATININE-BSD FRML MDRD: 83 ML/MIN/1.73
GLUCOSE BLD-MCNC: 100 MG/DL (ref 65–99)
HCT VFR BLD AUTO: 41.1 % (ref 34–46.6)
HGB BLD-MCNC: 14.1 G/DL (ref 12–15.9)
MCH RBC QN AUTO: 31.5 PG (ref 26.6–33)
MCHC RBC AUTO-ENTMCNC: 34.3 G/DL (ref 31.5–35.7)
MCV RBC AUTO: 91.7 FL (ref 79–97)
PLATELET # BLD AUTO: 277 10*3/MM3 (ref 140–450)
PMV BLD AUTO: 8.9 FL (ref 6–12)
POTASSIUM BLD-SCNC: 4.8 MMOL/L (ref 3.5–5.2)
RBC # BLD AUTO: 4.48 10*6/MM3 (ref 3.77–5.28)
SODIUM BLD-SCNC: 136 MMOL/L (ref 136–145)
WBC NRBC COR # BLD: 9.08 10*3/MM3 (ref 3.4–10.8)

## 2020-06-24 PROCEDURE — 93010 ELECTROCARDIOGRAM REPORT: CPT | Performed by: INTERNAL MEDICINE

## 2020-06-24 PROCEDURE — 85027 COMPLETE CBC AUTOMATED: CPT

## 2020-06-24 PROCEDURE — 93005 ELECTROCARDIOGRAM TRACING: CPT | Performed by: OTOLARYNGOLOGY

## 2020-06-24 PROCEDURE — 36415 COLL VENOUS BLD VENIPUNCTURE: CPT

## 2020-06-24 PROCEDURE — 80048 BASIC METABOLIC PNL TOTAL CA: CPT

## 2020-07-02 ENCOUNTER — TELEPHONE (OUTPATIENT)
Dept: FAMILY MEDICINE CLINIC | Facility: CLINIC | Age: 65
End: 2020-07-02

## 2020-07-10 ENCOUNTER — TRANSCRIBE ORDERS (OUTPATIENT)
Dept: ADMINISTRATIVE | Facility: HOSPITAL | Age: 65
End: 2020-07-10

## 2020-07-10 DIAGNOSIS — J98.4 RESTRICTIVE LUNG DISEASE: ICD-10-CM

## 2020-07-10 DIAGNOSIS — R06.02 SHORTNESS OF BREATH: Primary | ICD-10-CM

## 2020-07-12 ENCOUNTER — APPOINTMENT (OUTPATIENT)
Dept: CT IMAGING | Facility: HOSPITAL | Age: 65
End: 2020-07-12

## 2020-07-12 ENCOUNTER — HOSPITAL ENCOUNTER (EMERGENCY)
Facility: HOSPITAL | Age: 65
Discharge: HOME | End: 2020-07-12
Attending: EMERGENCY MEDICINE | Admitting: EMERGENCY MEDICINE

## 2020-07-12 ENCOUNTER — APPOINTMENT (OUTPATIENT)
Dept: GENERAL RADIOLOGY | Facility: HOSPITAL | Age: 65
End: 2020-07-12

## 2020-07-12 VITALS
HEART RATE: 72 BPM | WEIGHT: 162 LBS | DIASTOLIC BLOOD PRESSURE: 103 MMHG | HEIGHT: 68 IN | OXYGEN SATURATION: 98 % | RESPIRATION RATE: 16 BRPM | BODY MASS INDEX: 24.55 KG/M2 | TEMPERATURE: 98.5 F | SYSTOLIC BLOOD PRESSURE: 144 MMHG

## 2020-07-12 DIAGNOSIS — R07.89 CHEST WALL PAIN: Primary | ICD-10-CM

## 2020-07-12 LAB
ALBUMIN SERPL-MCNC: 4.2 G/DL (ref 3.5–5.2)
ALBUMIN/GLOB SERPL: 1.6 G/DL
ALP SERPL-CCNC: 67 U/L (ref 39–117)
ALT SERPL W P-5'-P-CCNC: 9 U/L (ref 1–33)
ANION GAP SERPL CALCULATED.3IONS-SCNC: 11.9 MMOL/L (ref 5–15)
APTT PPP: 23.9 SECONDS (ref 22.7–35.4)
AST SERPL-CCNC: 10 U/L (ref 1–32)
BASOPHILS # BLD AUTO: 0.07 10*3/MM3 (ref 0–0.2)
BASOPHILS NFR BLD AUTO: 0.6 % (ref 0–1.5)
BILIRUB SERPL-MCNC: 0.3 MG/DL (ref 0–1.2)
BUN SERPL-MCNC: 14 MG/DL (ref 8–23)
BUN/CREAT SERPL: 13.9 (ref 7–25)
CALCIUM SPEC-SCNC: 9.6 MG/DL (ref 8.6–10.5)
CHLORIDE SERPL-SCNC: 107 MMOL/L (ref 98–107)
CO2 SERPL-SCNC: 22.1 MMOL/L (ref 22–29)
CREAT SERPL-MCNC: 1.01 MG/DL (ref 0.57–1)
DEPRECATED RDW RBC AUTO: 44.7 FL (ref 37–54)
EOSINOPHIL # BLD AUTO: 0.18 10*3/MM3 (ref 0–0.4)
EOSINOPHIL NFR BLD AUTO: 1.7 % (ref 0.3–6.2)
ERYTHROCYTE [DISTWIDTH] IN BLOOD BY AUTOMATED COUNT: 12.9 % (ref 12.3–15.4)
GFR SERPL CREATININE-BSD FRML MDRD: 56 ML/MIN/1.73
GLOBULIN UR ELPH-MCNC: 2.7 GM/DL
GLUCOSE SERPL-MCNC: 106 MG/DL (ref 65–99)
HCT VFR BLD AUTO: 41.3 % (ref 34–46.6)
HGB BLD-MCNC: 14 G/DL (ref 12–15.9)
HOLD SPECIMEN: NORMAL
HOLD SPECIMEN: NORMAL
IMM GRANULOCYTES # BLD AUTO: 0.03 10*3/MM3 (ref 0–0.05)
IMM GRANULOCYTES NFR BLD AUTO: 0.3 % (ref 0–0.5)
INR PPP: 0.94 (ref 0.9–1.1)
LYMPHOCYTES # BLD AUTO: 2.24 10*3/MM3 (ref 0.7–3.1)
LYMPHOCYTES NFR BLD AUTO: 20.6 % (ref 19.6–45.3)
MCH RBC QN AUTO: 31.7 PG (ref 26.6–33)
MCHC RBC AUTO-ENTMCNC: 33.9 G/DL (ref 31.5–35.7)
MCV RBC AUTO: 93.7 FL (ref 79–97)
MONOCYTES # BLD AUTO: 0.52 10*3/MM3 (ref 0.1–0.9)
MONOCYTES NFR BLD AUTO: 4.8 % (ref 5–12)
NEUTROPHILS NFR BLD AUTO: 7.81 10*3/MM3 (ref 1.7–7)
NEUTROPHILS NFR BLD AUTO: 72 % (ref 42.7–76)
NRBC BLD AUTO-RTO: 0 /100 WBC (ref 0–0.2)
NT-PROBNP SERPL-MCNC: 126.9 PG/ML (ref 0–900)
PLATELET # BLD AUTO: 240 10*3/MM3 (ref 140–450)
PMV BLD AUTO: 9 FL (ref 6–12)
POTASSIUM SERPL-SCNC: 3.8 MMOL/L (ref 3.5–5.2)
PROT SERPL-MCNC: 6.9 G/DL (ref 6–8.5)
PROTHROMBIN TIME: 12.5 SECONDS (ref 11.7–14.2)
RBC # BLD AUTO: 4.41 10*6/MM3 (ref 3.77–5.28)
SODIUM SERPL-SCNC: 141 MMOL/L (ref 136–145)
TROPONIN T SERPL-MCNC: <0.01 NG/ML (ref 0–0.03)
WBC # BLD AUTO: 10.85 10*3/MM3 (ref 3.4–10.8)
WHOLE BLOOD HOLD SPECIMEN: NORMAL
WHOLE BLOOD HOLD SPECIMEN: NORMAL

## 2020-07-12 PROCEDURE — 84484 ASSAY OF TROPONIN QUANT: CPT | Performed by: EMERGENCY MEDICINE

## 2020-07-12 PROCEDURE — 85730 THROMBOPLASTIN TIME PARTIAL: CPT | Performed by: EMERGENCY MEDICINE

## 2020-07-12 PROCEDURE — 93005 ELECTROCARDIOGRAM TRACING: CPT | Performed by: EMERGENCY MEDICINE

## 2020-07-12 PROCEDURE — 93005 ELECTROCARDIOGRAM TRACING: CPT

## 2020-07-12 PROCEDURE — 85610 PROTHROMBIN TIME: CPT | Performed by: EMERGENCY MEDICINE

## 2020-07-12 PROCEDURE — 99283 EMERGENCY DEPT VISIT LOW MDM: CPT

## 2020-07-12 PROCEDURE — 0 IOPAMIDOL PER 1 ML: Performed by: EMERGENCY MEDICINE

## 2020-07-12 PROCEDURE — 80053 COMPREHEN METABOLIC PANEL: CPT | Performed by: EMERGENCY MEDICINE

## 2020-07-12 PROCEDURE — 85025 COMPLETE CBC W/AUTO DIFF WBC: CPT | Performed by: EMERGENCY MEDICINE

## 2020-07-12 PROCEDURE — 93010 ELECTROCARDIOGRAM REPORT: CPT | Performed by: INTERNAL MEDICINE

## 2020-07-12 PROCEDURE — 99284 EMERGENCY DEPT VISIT MOD MDM: CPT

## 2020-07-12 PROCEDURE — 83880 ASSAY OF NATRIURETIC PEPTIDE: CPT | Performed by: EMERGENCY MEDICINE

## 2020-07-12 PROCEDURE — 71275 CT ANGIOGRAPHY CHEST: CPT

## 2020-07-12 RX ORDER — HYDROCODONE BITARTRATE AND ACETAMINOPHEN 7.5; 325 MG/1; MG/1
1 TABLET ORAL ONCE
Status: COMPLETED | OUTPATIENT
Start: 2020-07-12 | End: 2020-07-12

## 2020-07-12 RX ORDER — SODIUM CHLORIDE 0.9 % (FLUSH) 0.9 %
10 SYRINGE (ML) INJECTION AS NEEDED
Status: DISCONTINUED | OUTPATIENT
Start: 2020-07-12 | End: 2020-07-12 | Stop reason: HOSPADM

## 2020-07-12 RX ADMIN — IOPAMIDOL 100 ML: 755 INJECTION, SOLUTION INTRAVENOUS at 17:07

## 2020-07-12 RX ADMIN — HYDROCODONE BITARTRATE AND ACETAMINOPHEN 1 TABLET: 7.5; 325 TABLET ORAL at 17:20

## 2020-07-12 NOTE — ED NOTES
Pt reports CP starting x1 month increased since last night. Denies cough fever rash,   Denies any exposure to COVID   Patient was placed in face mask during first look triage.  Patient was wearing a face mask throughout encounter.  I wore personal protective equipment throughout the encounter.  Hand hygiene was performed before and after patient encounter.        Moriah Choi, RN  07/12/20 [id number]

## 2020-07-12 NOTE — ED PROVIDER NOTES
EMERGENCY DEPARTMENT ENCOUNTER    Room Number:  34/34  Date of encounter:  7/12/2020  PCP: Hunter Cosby MD  Historian: Patient      HPI:  Chief Complaint: Chest pain  A complete HPI/ROS/PMH/PSH/SH/FH are unobtainable due to: N/A    Context: Adele Ly is a 64 y.o. female who presents to the ED c/o chest pain that is been present for more than a month.  She did a lot of housekeeping yesterday, and last night noticed increasingly severe chest pain.  The chest pain is described as sharp and aching, it is located in the upper chest and radiates to her back and neck.  It is worsened with deep inspiration.  There are no associated symptoms.  There are no alleviating factors.  She has not sought treatment for this before.  She has a history of breast cancer and is status post mastectomy and chemotherapy.  She still smokes.  She has no prior history of DVT or PE, she does not take any hormone therapy.  No fevers or chills.  She has a mild cough that is nonproductive.  No nausea, vomiting or diarrhea.      The patient was placed in a mask in triage, hand hygiene was performed before and after my interaction with the patient.  I wore a mask and gloves during my entire interaction with the patient.    PAST MEDICAL HISTORY  Active Ambulatory Problems     Diagnosis Date Noted   • BP (high blood pressure) 03/13/2017   • Pulmonary emphysema (CMS/HCC) 03/13/2017   • Malignant neoplasm of overlapping sites of right breast in female, estrogen receptor positive (CMS/HCC) 12/18/2017   • Tobacco use disorder 03/15/2018   • Migraine without status migrainosus, not intractable 03/15/2018     Resolved Ambulatory Problems     Diagnosis Date Noted   • No Resolved Ambulatory Problems     Past Medical History:   Diagnosis Date   • Arthritis    • Breast cancer (CMS/HCC)    • Colon polyp    • Emphysema of lung (CMS/HCC)    • Genital herpes    • History of diverticulitis    • History of gastric ulcer    • Hypertension    • Infectious  viral hepatitis          PAST SURGICAL HISTORY  Past Surgical History:   Procedure Laterality Date   • BREAST BIOPSY Right 2017    malignant   • COLON RESECTION     • COLOSTOMY     • COLOSTOMY REVISION     • MASTECTOMY WITH SENTINEL NODE BIOPSY AND AXILLARY NODE DISSECTION Right 1/10/2018    Procedure: BREAST MASTECTOMY WITH SENTINEL NODE BIOPSY;  Surgeon: Juan Peterson MD;  Location: [facility];  Service:    • PELVIC LAPAROSCOPY     • TONSILLECTOMY     • WISDOM TOOTH EXTRACTION           FAMILY HISTORY  Family History   Problem Relation Age of Onset   • Diabetes Father    • Hypertension Father    • No Known Problems Mother    • No Known Problems Sister    • No Known Problems Brother    • No Known Problems Daughter    • No Known Problems Son    • Breast cancer Maternal Grandmother 60   • No Known Problems Paternal Grandmother    • Breast cancer Maternal Aunt 60   • Diabetes Paternal Aunt    • Ovarian cancer Paternal Aunt 41   • Colon cancer Maternal Uncle    • COPD Paternal Uncle    • BRCA 1/2 Neg Hx    • Endometrial cancer Neg Hx    • Malig Hyperthermia Neg Hx          SOCIAL HISTORY  Social History     Socioeconomic History   • Marital status:      Spouse name: Not on file   • Number of children: 0   • Years of education: Not on file   • Highest education level: Not on file   Occupational History     Employer: [employer]   Tobacco Use   • Smoking status: Current Every Day Smoker     Packs/day: 1.00     Types: Cigarettes     Start date: 1975   • Smokeless tobacco: Current User   Substance and Sexual Activity   • Alcohol use: Yes     Comment: COUPLE DAYS A WEEK , BEER   • Drug use: No   • Sexual activity: Defer     Birth control/protection: Post-menopausal         ALLERGIES  Penicillins        REVIEW OF SYSTEMS  Review of Systems   Constitutional: Negative for fever.   HENT: Negative for sore throat.    Eyes: Negative.    Respiratory: Positive for cough. Negative for shortness of breath.     Cardiovascular: Positive for chest pain. Negative for leg swelling.   Gastrointestinal: Negative for abdominal pain, diarrhea and vomiting.   Genitourinary: Negative for dysuria.   Musculoskeletal: Negative for neck pain.   Skin: Negative for rash.   Allergic/Immunologic: Negative.    Neurological: Negative for weakness, numbness and headaches.   Hematological: Negative.    Psychiatric/Behavioral: Negative.    All other systems reviewed and are negative.       All systems reviewed and negative except for those discussed in HPI.       PHYSICAL EXAM    I have reviewed the triage vital signs and nursing notes.    ED Triage Vitals   Temp Heart Rate Resp BP SpO2   07/12/20 1436 07/12/20 1436 07/12/20 1436 07/12/20 1540 07/12/20 1436   98.5 °F (36.9 °C) 97 18 126/76 90 %      Temp src Heart Rate Source Patient Position BP Location FiO2 (%)   07/12/20 1436 07/12/20 1436 07/12/20 1540 07/12/20 1540 --   Tympanic Monitor Lying Left arm        Physical Exam   Constitutional: Pt. is oriented to person, place, and time and well-developed, well-nourished, and in no distress. No distress.   HENT: Normocephalic and atraumatic,  EOM are normal. Pupils are equal, round, and reactive to light. Oropharynx moist/nonerythematous.  Neck: Normal range of motion. Neck supple. No JVD present. No tracheal deviation present. No thyromegaly present.   Cardiovascular: Normal rate, regular rhythm and normal heart sounds. Exam reveals no gallop and no friction rub.   No murmur heard.  Pulmonary/Chest: Effort normal and breath sounds normal. No stridor. No respiratory distress. No wheezes, no rales.   Abdominal: Soft. Bowel sounds are normal. No distension. There is no tenderness. There is no rebound and no guarding.   Musculoskeletal: Normal range of motion. No edema, tenderness or deformity.   Neurological: Pt. is alert and oriented to person, place, and time. Pt. has normal sensation and normal strength. No cranial nerve deficit. GCS score is  15.   Skin: Skin is warm and dry. No rash noted. Pt. is not diaphoretic. No erythema.   Psychiatric: Mood, affect and judgment normal.   Nursing note and vitals reviewed.        LAB RESULTS  Recent Results (from the past 24 hour(s))   Comprehensive Metabolic Panel    Collection Time: 07/12/20  3:41 PM   Result Value Ref Range    Glucose 106 (H) 65 - 99 mg/dL    BUN 14 8 - 23 mg/dL    Creatinine 1.01 (H) 0.57 - 1.00 mg/dL    Sodium 141 136 - 145 mmol/L    Potassium 3.8 3.5 - 5.2 mmol/L    Chloride 107 98 - 107 mmol/L    CO2 22.1 22.0 - 29.0 mmol/L    Calcium 9.6 8.6 - 10.5 mg/dL    Total Protein 6.9 6.0 - 8.5 g/dL    Albumin 4.20 3.50 - 5.20 g/dL    ALT (SGPT) 9 1 - 33 U/L    AST (SGOT) 10 1 - 32 U/L    Alkaline Phosphatase 67 39 - 117 U/L    Total Bilirubin 0.3 0.0 - 1.2 mg/dL    eGFR Non African Amer 56 (L) >60 mL/min/1.73    Globulin 2.7 gm/dL    A/G Ratio 1.6 g/dL    BUN/Creatinine Ratio 13.9 7.0 - 25.0    Anion Gap 11.9 5.0 - 15.0 mmol/L   Troponin    Collection Time: 07/12/20  3:41 PM   Result Value Ref Range    Troponin T <0.010 0.000 - 0.030 ng/mL   Light Blue Top    Collection Time: 07/12/20  3:41 PM   Result Value Ref Range    Extra Tube hold for add-on    Green Top (Gel)    Collection Time: 07/12/20  3:41 PM   Result Value Ref Range    Extra Tube Hold for add-ons.    Lavender Top    Collection Time: 07/12/20  3:41 PM   Result Value Ref Range    Extra Tube hold for add-on    Gold Top - SST    Collection Time: 07/12/20  3:41 PM   Result Value Ref Range    Extra Tube Hold for add-ons.    CBC Auto Differential    Collection Time: 07/12/20  3:41 PM   Result Value Ref Range    WBC 10.85 (H) 3.40 - 10.80 10*3/mm3    RBC 4.41 3.77 - 5.28 10*6/mm3    Hemoglobin 14.0 12.0 - 15.9 g/dL    Hematocrit 41.3 34.0 - 46.6 %    MCV 93.7 79.0 - 97.0 fL    MCH 31.7 26.6 - 33.0 pg    MCHC 33.9 31.5 - 35.7 g/dL    RDW 12.9 12.3 - 15.4 %    RDW-SD 44.7 37.0 - 54.0 fl    MPV 9.0 6.0 - 12.0 fL    Platelets 240 140 - 450 10*3/mm3     Neutrophil % 72.0 42.7 - 76.0 %    Lymphocyte % 20.6 19.6 - 45.3 %    Monocyte % 4.8 (L) 5.0 - 12.0 %    Eosinophil % 1.7 0.3 - 6.2 %    Basophil % 0.6 0.0 - 1.5 %    Immature Grans % 0.3 0.0 - 0.5 %    Neutrophils, Absolute 7.81 (H) 1.70 - 7.00 10*3/mm3    Lymphocytes, Absolute 2.24 0.70 - 3.10 10*3/mm3    Monocytes, Absolute 0.52 0.10 - 0.90 10*3/mm3    Eosinophils, Absolute 0.18 0.00 - 0.40 10*3/mm3    Basophils, Absolute 0.07 0.00 - 0.20 10*3/mm3    Immature Grans, Absolute 0.03 0.00 - 0.05 10*3/mm3    nRBC 0.0 0.0 - 0.2 /100 WBC   Protime-INR    Collection Time: 07/12/20  3:41 PM   Result Value Ref Range    Protime 12.5 11.7 - 14.2 Seconds    INR 0.94 0.90 - 1.10   aPTT    Collection Time: 07/12/20  3:41 PM   Result Value Ref Range    PTT 23.9 22.7 - 35.4 seconds   BNP    Collection Time: 07/12/20  3:41 PM   Result Value Ref Range    proBNP 126.9 0.0 - 900.0 pg/mL       Ordered the above labs and independently reviewed the results.        RADIOLOGY  Ct Angiogram Chest    Result Date: 7/12/2020  CT ANGIOGRAM CHEST-  Radiation dose reduction techniques were utilized, including automated exposure control and exposure modulation based on body size.  CLINICAL: Evaluate for pulmonary embolus. Breast carcinoma with right mastectomy.  CT SCAN OF THE CHEST WITH INTRAVENOUS CONTRAST, PULMONARY EMBOLUS PROTOCOL TO INCLUDE CT ANGIOGRAM AND THREE-DIMENSIONAL RECONSTRUCTION  FINDINGS: Thin axial CT angio reconstructed images fail to demonstrate pulmonary embolus. Diameter of the aorta is within normal limits, no aneurysm. Esophagus is satisfactory in course and caliber. There is cardiac enlargement, no pericardial abnormality. No mediastinal nor hilar mass/lymphadenopathy.  The right mastectomy site is satisfactory in appearance. Left breast parenchyma is typical in appearance, no axillary adenopathy seen. No pleural effusion, lung consolidation or suspicious pulmonary lesion identified. Limited images through the upper  abdomen demonstrate a right upper pole, sizable complex renal cyst.  CONCLUSION: 1. No pulmonary embolus demonstrated. 2. Cardiomegaly. 3. No acute intrathoracic abnormality or pleural effusion identified. 4. Right mastectomy site is satisfactory in appearance.  Findings of this report called to Dr. Hernandez in the emergency room at the time of completion, 5:25 PM.             I ordered the above noted radiological studies. Reviewed by me and discussed with radiologist.  See dictation for official radiology interpretation.      PROCEDURES    Procedures      MEDICATIONS GIVEN IN ER    Medications   sodium chloride 0.9 % flush 10 mL (has no administration in time range)   sodium chloride 0.9 % flush 10 mL (has no administration in time range)   iopamidol (ISOVUE-370) 76 % injection 100 mL (100 mL Intravenous Given by Other 7/12/20 1707)   HYDROcodone-acetaminophen (NORCO) 7.5-325 MG per tablet 1 tablet (1 tablet Oral Given 7/12/20 1720)         PROGRESS, DATA ANALYSIS, CONSULTS, AND MEDICAL DECISION MAKING    Any/all labs have been independently reviewed by me.  Any/all radiology studies have been reviewed by me and discussed with radiologist dictating the report.   EKG's independently viewed and interpreted by me.  Discussion below represents my analysis of pertinent findings related to patient's condition, differential diagnosis, treatment plan and final disposition.      ED Course as of Jul 12 1752   Sun Jul 12, 2020   1548 EKG performed at 1439 and interpreted by me shows normal sinus rhythm with a heart of 85 bpm.  Intervals, QRS complexes and ST-T segments are all unremarkable.  No significant change when compared to 6/24/2020    [WC]   1729 CT of the chest was independently viewed by me and discussed with Dr. Atkinson (radiology) -there are no acute processes identified.  For official interpretation, see dictated report.    [WC]   1745 Labs, EKG and imaging as above.  My clinical suspicion for a serious  underlying pulmonary or cardiac etiology is low.  There is no evidence to suggest pulmonary embolism, aortic dissection or acute coronary syndrome.  The patient was advised to return to the emergency department should the symptoms worsen or for any new concerns.  The patient can be safely followed as an outpatient for further workup as indicated.      [WC]   1751 She feels better after hydrocodone.    Labs, EKG and imaging as above.  My clinical suspicion for a serious underlying pulmonary or cardiac etiology is low.  There is no evidence to suggest pulmonary embolism, aortic dissection or acute coronary syndrome.  The patient was advised to return to the emergency department should the symptoms worsen or for any new concerns.  The patient can be safely followed as an outpatient for further workup as indicated.      HEART SCORE    History Slightly or non-suspicious (0)  ECG Normal (0)  Age 46-65 (1)  Risk factors 1 or 2 (1)  Troponin < or = Normal limit (0)    This patient's HEART score is 2    HEART Score Key:  Scores 0-3: 0.9-1.7% risk of adverse cardiac event. In the HEART Score study, these patients were discharged (0.99% in the retrospective study, 1.7% in the prospective study)  Scores 4-6: 12-16.6% risk of adverse cardiac event. In the HEART Score study, these patients were admitted to the hospital. (11.6% retrospective, 16.6% prospective)  Scores ?7: 50-65% risk of adverse cardiac event. In the HEART Score study, these patients were candidates for early invasive measures. (65.2% retrospective, 50.1% prospective)        [WC]      ED Course User Index  [WC] Juan Hernandez MD       AS OF 17:52 VITALS:    BP - 119/73  HR - 79  TEMP - 98.5 °F (36.9 °C) (Tympanic)  02 SATS - 90%        DIAGNOSIS  Final diagnoses:   Chest wall pain         DISPOSITION  Discharged           uJan Hernandez MD  07/12/20 [id number]

## 2020-07-12 NOTE — ED NOTES
This RN wore goggles, mask, and gloves while in pts room. Pt wearing surgical mask.       Katie Heck, RN  07/12/20 0756

## 2020-07-28 ENCOUNTER — APPOINTMENT (OUTPATIENT)
Dept: CT IMAGING | Facility: HOSPITAL | Age: 65
End: 2020-07-28

## 2020-09-21 RX ORDER — OMEPRAZOLE 20 MG/1
CAPSULE, DELAYED RELEASE ORAL
Qty: 30 CAPSULE | Refills: 5 | Status: SHIPPED | OUTPATIENT
Start: 2020-09-21 | End: 2021-02-24 | Stop reason: SDUPTHER

## 2020-09-21 RX ORDER — AMLODIPINE BESYLATE 10 MG/1
TABLET ORAL
Qty: 30 TABLET | Refills: 5 | Status: SHIPPED | OUTPATIENT
Start: 2020-09-21 | End: 2021-03-09 | Stop reason: SDUPTHER

## 2020-12-23 ENCOUNTER — TELEPHONE (OUTPATIENT)
Dept: FAMILY MEDICINE CLINIC | Facility: CLINIC | Age: 65
End: 2020-12-23

## 2020-12-23 NOTE — TELEPHONE ENCOUNTER
TEJA FROM [facility] CALLED. PT HAS BEEN PUT ON MEDICATION TO MANAGE HER BLOOD PRESSURE.  THEY WERE ASKING IF DR. GARCIA WOULD CONTINUE HER REFILLS?      TEJA [phone]

## 2020-12-28 NOTE — TELEPHONE ENCOUNTER
I spoke with Marly. This medication is regarding pt's Losartan. Would you be okay to take over this prescription?

## 2021-02-24 ENCOUNTER — OFFICE VISIT (OUTPATIENT)
Dept: FAMILY MEDICINE CLINIC | Facility: CLINIC | Age: 66
End: 2021-02-24

## 2021-02-24 VITALS
SYSTOLIC BLOOD PRESSURE: 140 MMHG | OXYGEN SATURATION: 100 % | RESPIRATION RATE: 18 BRPM | BODY MASS INDEX: 25.67 KG/M2 | DIASTOLIC BLOOD PRESSURE: 74 MMHG | WEIGHT: 169.4 LBS | TEMPERATURE: 96.6 F | HEART RATE: 83 BPM | HEIGHT: 68 IN

## 2021-02-24 DIAGNOSIS — I10 ESSENTIAL HYPERTENSION: Primary | ICD-10-CM

## 2021-02-24 DIAGNOSIS — F17.200 TOBACCO USE DISORDER: ICD-10-CM

## 2021-02-24 DIAGNOSIS — J43.9 PULMONARY EMPHYSEMA, UNSPECIFIED EMPHYSEMA TYPE (HCC): ICD-10-CM

## 2021-02-24 PROCEDURE — 99214 OFFICE O/P EST MOD 30 MIN: CPT | Performed by: INTERNAL MEDICINE

## 2021-02-24 RX ORDER — OMEPRAZOLE 20 MG/1
20 CAPSULE, DELAYED RELEASE ORAL DAILY
Qty: 90 CAPSULE | Refills: 1 | Status: SHIPPED | OUTPATIENT
Start: 2021-02-24 | End: 2021-08-27 | Stop reason: SDUPTHER

## 2021-02-24 RX ORDER — LOSARTAN POTASSIUM 100 MG/1
100 TABLET ORAL DAILY
Qty: 90 TABLET | Refills: 1 | Status: SHIPPED | OUTPATIENT
Start: 2021-02-24 | End: 2021-08-27 | Stop reason: SDUPTHER

## 2021-02-24 RX ORDER — LOSARTAN POTASSIUM 50 MG/1
50 TABLET ORAL DAILY
COMMUNITY
Start: 2021-02-21 | End: 2021-02-24

## 2021-02-24 RX ORDER — APREMILAST 30 MG/1
TABLET, FILM COATED ORAL
COMMUNITY
Start: 2021-02-09 | End: 2021-02-24 | Stop reason: SDUPTHER

## 2021-02-24 RX ORDER — AMLODIPINE BESYLATE 10 MG/1
10 TABLET ORAL DAILY
Qty: 90 TABLET | Refills: 1 | Status: SHIPPED | OUTPATIENT
Start: 2021-02-24 | End: 2021-08-27 | Stop reason: SDUPTHER

## 2021-02-24 RX ORDER — TIOTROPIUM BROMIDE INHALATION SPRAY 3.12 UG/1
SPRAY, METERED RESPIRATORY (INHALATION)
COMMUNITY
Start: 2020-11-20 | End: 2021-08-27 | Stop reason: SDUPTHER

## 2021-02-24 RX ORDER — LOSARTAN POTASSIUM 100 MG/1
100 TABLET ORAL DAILY
Qty: 90 TABLET | Refills: 1 | Status: SHIPPED | OUTPATIENT
Start: 2021-02-24 | End: 2021-02-24 | Stop reason: SDUPTHER

## 2021-02-24 RX ORDER — CHLORHEXIDINE GLUCONATE 0.12 MG/ML
RINSE ORAL
COMMUNITY
Start: 2021-01-21

## 2021-03-09 NOTE — PROGRESS NOTES
Subjective   Adele Ly is a 65 y.o. female. Patient is here today for   Chief Complaint   Patient presents with   • Hypertension     medication management.    • Migraine          Vitals:    02/24/21 1559   BP: 140/74   Pulse: 83   Resp: 18   Temp: 96.6 °F (35.9 °C)   SpO2: 100%     Body mass index is 25.76 kg/m².      Past Medical History:   Diagnosis Date   • Arthritis    • Breast cancer (CMS/HCC)     Right, Stage IA   • Colon polyp    • Emphysema of lung (CMS/HCC)    • Genital herpes    • History of diverticulitis    • History of gastric ulcer    • Hypertension    • Infectious viral hepatitis     B      Allergies   Allergen Reactions   • Penicillins Swelling     THROAT SWELLS   • Latex Rash      Social History     Socioeconomic History   • Marital status:      Spouse name: Not on file   • Number of children: 0   • Years of education: Not on file   • Highest education level: Not on file   Tobacco Use   • Smoking status: Current Every Day Smoker     Packs/day: 1.00     Types: Cigarettes     Start date: 1975   • Smokeless tobacco: Current User   Substance and Sexual Activity   • Alcohol use: Yes     Comment: COUPLE DAYS A WEEK , BEER   • Drug use: No   • Sexual activity: Defer     Birth control/protection: Post-menopausal        Current Outpatient Medications:   •  acyclovir (ZOVIRAX) 400 MG tablet, TAKE ONE TABLET BY MOUTH DAILY.  MAX OF 5 DOSES DAY, Disp: 30 tablet, Rfl: 11  •  amLODIPine (NORVASC) 10 MG tablet, Take 1 tablet by mouth Daily., Disp: 90 tablet, Rfl: 1  •  Apremilast (OTEZLA PO), Take 1 tablet/day by mouth 2 (Two) Times a Day., Disp: , Rfl:   •  chlorhexidine (PERIDEX) 0.12 % solution, , Disp: , Rfl:   •  fluticasone-salmeterol (ADVAIR) 250-50 MCG/DOSE DISKUS, Inhale 1 puff 2 (Two) Times a Day., Disp: 60 each, Rfl: 5  •  omeprazole (priLOSEC) 20 MG capsule, Take 1 capsule by mouth Daily., Disp: 90 capsule, Rfl: 1  •  Spiriva Respimat 2.5 MCG/ACT aerosol solution inhaler, , Disp: , Rfl:    •  SUMAtriptan (IMITREX) 50 MG tablet, Take one tablet at onset of headache. May repeat dose one time in 2 hours if headache not relieved., Disp: 9 tablet, Rfl: 2  •  amLODIPine (NORVASC) 10 MG tablet, TAKE ONE TABLET BY MOUTH DAILY, Disp: 30 tablet, Rfl: 5  •  diclofenac (VOLTAREN) 50 MG EC tablet, Take 1 tablet by mouth 3 (Three) Times a Day., Disp: 30 tablet, Rfl: 0  •  losartan (Cozaar) 100 MG tablet, Take 1 tablet by mouth Daily., Disp: 90 tablet, Rfl: 1     Objective     She is here today to follow-up on hypertension.    Its been a while since I saw her last.    She tells me that she is feeling pretty well.    She continues to smoke cigarettes.    She has a history of breast cancer.  She has not followed up with oncology as they had suggested she follow-up.    She has pulmonary emphysema and follows up with pulmonology occasionally.        Hypertension    Migraine          Review of Systems   Constitutional: Negative.    HENT: Negative.    Respiratory: Negative.    Cardiovascular: Negative.    Musculoskeletal: Negative.    Psychiatric/Behavioral: Negative.        Physical Exam  Vitals and nursing note reviewed.   Constitutional:       Appearance: Normal appearance. She is not ill-appearing or diaphoretic.      Comments: Pleasant, neatly groomed, in no distress.   HENT:      Head: Normocephalic and atraumatic.   Neck:      Vascular: No carotid bruit.   Cardiovascular:      Rate and Rhythm: Normal rate and regular rhythm.      Heart sounds: Normal heart sounds.   Pulmonary:      Effort: Pulmonary effort is normal. No respiratory distress.      Breath sounds: Normal breath sounds. No stridor. No wheezing or rales.   Chest:      Chest wall: No tenderness.   Neurological:      Mental Status: She is alert and oriented to person, place, and time.   Psychiatric:         Mood and Affect: Mood normal.         Behavior: Behavior normal.         Thought Content: Thought content normal.           Problems Addressed this  Visit        Cardiac and Vasculature    BP (high blood pressure) - Primary    Relevant Medications    amLODIPine (NORVASC) 10 MG tablet    losartan (Cozaar) 100 MG tablet       Pulmonary and Pneumonias    Pulmonary emphysema (CMS/HCC)    Relevant Medications    Spiriva Respimat 2.5 MCG/ACT aerosol solution inhaler       Tobacco    Tobacco use disorder      Diagnoses       Codes Comments    Essential hypertension    -  Primary ICD-10-CM: I10  ICD-9-CM: 401.9     Pulmonary emphysema, unspecified emphysema type (CMS/HCC)     ICD-10-CM: J43.9  ICD-9-CM: 492.8     Tobacco use disorder     ICD-10-CM: F17.200  ICD-9-CM: 305.1             PLAN  Her hypertension appears to be relatively well controlled.    She smokes cigarettes and we discussed the desirability of smoking cessation for greater than 3 minutes today.    She should follow-up with pulmonary medicine as directed by them.    She should follow-up with oncology as directed by her oncologist.    Her hypertension is marginally well controlled.  She may have been out of her medication for a brief time.  I have asked her to continue using losartan daily.  I like to have her back in about 6 months to recheck her blood pressure.  She ought to have a comprehensive physical examination once yearly.  That follow-up visit in 6 months would be an appropriate time for that.  No follow-ups on file.

## 2021-05-24 RX ORDER — ACYCLOVIR 400 MG/1
TABLET ORAL
Qty: 30 TABLET | Refills: 0 | Status: SHIPPED | OUTPATIENT
Start: 2021-05-24 | End: 2021-06-22 | Stop reason: SDUPTHER

## 2021-06-21 RX ORDER — ACYCLOVIR 400 MG/1
TABLET ORAL
Qty: 30 TABLET | Refills: 0 | OUTPATIENT
Start: 2021-06-21

## 2021-06-22 ENCOUNTER — TELEPHONE (OUTPATIENT)
Dept: OBSTETRICS AND GYNECOLOGY | Age: 66
End: 2021-06-22

## 2021-06-22 RX ORDER — ACYCLOVIR 400 MG/1
400 TABLET ORAL
Qty: 30 TABLET | Refills: 4 | Status: SHIPPED | OUTPATIENT
Start: 2021-06-22 | End: 2021-11-11 | Stop reason: SDUPTHER

## 2021-06-22 NOTE — TELEPHONE ENCOUNTER
Dr taylor pt calls stating pharmacy has not received her refill on acyclovir & asking for it to be re-sent in. Pt asking if she can have more than 1 refill on the medication so she does not have to call every month. Pt has set up annual with Dr Taylor for 11/2021 (this was the first 3:15 Dr Taylor had, pt needed the latest time as close to 4pm as possible.) Please advise Pharmacy verified

## 2021-08-27 ENCOUNTER — OFFICE VISIT (OUTPATIENT)
Dept: FAMILY MEDICINE CLINIC | Facility: CLINIC | Age: 66
End: 2021-08-27

## 2021-08-27 VITALS
HEART RATE: 79 BPM | HEIGHT: 68 IN | DIASTOLIC BLOOD PRESSURE: 82 MMHG | BODY MASS INDEX: 25.34 KG/M2 | SYSTOLIC BLOOD PRESSURE: 142 MMHG | WEIGHT: 167.2 LBS | OXYGEN SATURATION: 99 % | TEMPERATURE: 97 F | RESPIRATION RATE: 18 BRPM

## 2021-08-27 DIAGNOSIS — J43.9 PULMONARY EMPHYSEMA, UNSPECIFIED EMPHYSEMA TYPE (HCC): ICD-10-CM

## 2021-08-27 DIAGNOSIS — F17.200 TOBACCO USE DISORDER: ICD-10-CM

## 2021-08-27 DIAGNOSIS — I10 ESSENTIAL HYPERTENSION: Primary | ICD-10-CM

## 2021-08-27 PROCEDURE — 99214 OFFICE O/P EST MOD 30 MIN: CPT | Performed by: INTERNAL MEDICINE

## 2021-08-27 RX ORDER — AMLODIPINE BESYLATE 10 MG/1
10 TABLET ORAL DAILY
Qty: 90 TABLET | Refills: 1 | Status: SHIPPED | OUTPATIENT
Start: 2021-08-27 | End: 2022-03-17

## 2021-08-27 RX ORDER — LOSARTAN POTASSIUM 100 MG/1
100 TABLET ORAL DAILY
Qty: 90 TABLET | Refills: 1 | Status: SHIPPED | OUTPATIENT
Start: 2021-08-27 | End: 2022-03-17

## 2021-08-27 RX ORDER — TIOTROPIUM BROMIDE INHALATION SPRAY 3.12 UG/1
2 SPRAY, METERED RESPIRATORY (INHALATION)
Qty: 4 G | Refills: 11 | Status: SHIPPED | OUTPATIENT
Start: 2021-08-27 | End: 2022-09-09 | Stop reason: SDUPTHER

## 2021-08-27 RX ORDER — OMEPRAZOLE 20 MG/1
20 CAPSULE, DELAYED RELEASE ORAL DAILY
Qty: 90 CAPSULE | Refills: 1 | Status: SHIPPED | OUTPATIENT
Start: 2021-08-27 | End: 2022-03-17

## 2021-08-27 NOTE — PROGRESS NOTES
Subjective   Adele Ly is a 65 y.o. female. Patient is here today for   Chief Complaint   Patient presents with   • Hypertension     follow up           Vitals:    08/27/21 1611   BP: 142/82   Pulse: 79   Resp: 18   Temp: 97 °F (36.1 °C)   SpO2: 99%     Body mass index is 25.43 kg/m².      Past Medical History:   Diagnosis Date   • Arthritis    • Breast cancer (CMS/HCC)     Right, Stage IA   • Colon polyp    • Emphysema of lung (CMS/HCC)    • Genital herpes    • History of diverticulitis    • History of gastric ulcer    • Hypertension    • Infectious viral hepatitis     B      Allergies   Allergen Reactions   • Penicillins Swelling     THROAT SWELLS   • Latex Rash      Social History     Socioeconomic History   • Marital status:      Spouse name: Not on file   • Number of children: 0   • Years of education: Not on file   • Highest education level: Not on file   Tobacco Use   • Smoking status: Current Every Day Smoker     Packs/day: 1.00     Types: Cigarettes     Start date: 1975   • Smokeless tobacco: Current User   Substance and Sexual Activity   • Alcohol use: Yes     Comment: COUPLE DAYS A WEEK , BEER   • Drug use: No   • Sexual activity: Defer     Birth control/protection: Post-menopausal        Current Outpatient Medications:   •  acyclovir (ZOVIRAX) 400 MG tablet, Take 1 tablet by mouth 5 (Five) Times a Day. Take no more than 5 doses a day., Disp: 30 tablet, Rfl: 4  •  amLODIPine (NORVASC) 10 MG tablet, Take 1 tablet by mouth Daily., Disp: 90 tablet, Rfl: 1  •  Apremilast (OTEZLA PO), Take 1 tablet/day by mouth 2 (Two) Times a Day., Disp: , Rfl:   •  chlorhexidine (PERIDEX) 0.12 % solution, , Disp: , Rfl:   •  fluticasone-salmeterol (ADVAIR) 250-50 MCG/DOSE DISKUS, Inhale 1 puff 2 (Two) Times a Day., Disp: 60 each, Rfl: 5  •  losartan (Cozaar) 100 MG tablet, Take 1 tablet by mouth Daily., Disp: 90 tablet, Rfl: 1  •  omeprazole (priLOSEC) 20 MG capsule, Take 1 capsule by mouth Daily., Disp: 90  capsule, Rfl: 1  •  Spiriva Respimat 2.5 MCG/ACT aerosol solution inhaler, Inhale 2 puffs Daily., Disp: 4 g, Rfl: 11  •  SUMAtriptan (IMITREX) 50 MG tablet, Take one tablet at onset of headache. May repeat dose one time in 2 hours if headache not relieved., Disp: 9 tablet, Rfl: 2     Objective     This pleasant lady has a history of right-sided breast cancer.  She follows up with gynecology recently.    She has chronic obstructive pulmonary disease.  She follows up with pulmonary medicine.    She is here today to follow-up on hypertension.  She is due for a comprehensive physical exam.    She did not get fasting lab in preparation for today's visit.    She continues to smoke cigarettes.       Review of Systems   Constitutional: Negative.    HENT: Negative.    Respiratory: Negative.    Cardiovascular: Negative.    Musculoskeletal: Negative.    Psychiatric/Behavioral: Negative.        Physical Exam  Vitals and nursing note reviewed.   Constitutional:       Appearance: Normal appearance.      Comments: Pleasant, neatly groomed, no distress.   Neck:      Vascular: No carotid bruit.   Cardiovascular:      Rate and Rhythm: Regular rhythm.      Heart sounds: Normal heart sounds. No murmur heard.   No gallop.    Pulmonary:      Effort: No respiratory distress.      Breath sounds: Normal breath sounds. No wheezing or rales.   Neurological:      Mental Status: She is alert and oriented to person, place, and time.   Psychiatric:         Mood and Affect: Mood normal.         Behavior: Behavior normal.         Thought Content: Thought content normal.           Problems Addressed this Visit        Cardiac and Vasculature    BP (high blood pressure) - Primary    Relevant Medications    amLODIPine (NORVASC) 10 MG tablet    losartan (Cozaar) 100 MG tablet       Pulmonary and Pneumonias    Pulmonary emphysema (CMS/HCC)    Relevant Medications    Spiriva Respimat 2.5 MCG/ACT aerosol solution inhaler       Tobacco    Tobacco use  disorder      Diagnoses       Codes Comments    Essential hypertension    -  Primary ICD-10-CM: I10  ICD-9-CM: 401.9     Pulmonary emphysema, unspecified emphysema type (CMS/HCC)     ICD-10-CM: J43.9  ICD-9-CM: 492.8     Tobacco use disorder     ICD-10-CM: F17.200  ICD-9-CM: 305.1             PLAN  Her hypertension is well controlled.  When I checked her blood pressure today in her right arm she was seated I got 130/72.    She has pulmonary emphysema.  She continues to smoke cigarettes.  I urged her to do her best smoking.  I refilled her Spiriva Respimat though I did ask her to follow-up with her pulmonologist once yearly.    I asked her to follow-up for a comprehensive physical exam at her convenience.  I expect that to be about 6 months from now.  No follow-ups on file.

## 2021-11-11 ENCOUNTER — OFFICE VISIT (OUTPATIENT)
Dept: OBSTETRICS AND GYNECOLOGY | Age: 66
End: 2021-11-11

## 2021-11-11 VITALS
SYSTOLIC BLOOD PRESSURE: 130 MMHG | BODY MASS INDEX: 25.46 KG/M2 | HEIGHT: 68 IN | WEIGHT: 168 LBS | DIASTOLIC BLOOD PRESSURE: 76 MMHG

## 2021-11-11 DIAGNOSIS — Z12.31 ENCOUNTER FOR SCREENING MAMMOGRAM FOR MALIGNANT NEOPLASM OF BREAST: ICD-10-CM

## 2021-11-11 DIAGNOSIS — Z01.419 ENCOUNTER FOR GYNECOLOGICAL EXAMINATION WITHOUT ABNORMAL FINDING: ICD-10-CM

## 2021-11-11 DIAGNOSIS — Z12.4 SCREENING FOR CERVICAL CANCER: ICD-10-CM

## 2021-11-11 DIAGNOSIS — Z11.51 SCREENING FOR HPV (HUMAN PAPILLOMAVIRUS): ICD-10-CM

## 2021-11-11 DIAGNOSIS — N95.1 MENOPAUSAL SYMPTOMS: Primary | ICD-10-CM

## 2021-11-11 PROCEDURE — 99396 PREV VISIT EST AGE 40-64: CPT | Performed by: OBSTETRICS & GYNECOLOGY

## 2021-11-11 RX ORDER — ACYCLOVIR 400 MG/1
400 TABLET ORAL
Qty: 30 TABLET | Refills: 4 | Status: SHIPPED | OUTPATIENT
Start: 2021-11-11 | End: 2022-02-22 | Stop reason: SDUPTHER

## 2021-11-11 NOTE — PROGRESS NOTES
Routine Annual Visit    2021    Patient: Adele Ly          MR#:[id number]      Chief Complaint   Patient presents with   • Gynecologic Exam     Last Pap 10/16/2017 (-), Last Mammo 2019, Last C/Scope Not on file, Last Dexa , C/O Hot Flashes       History of Present Illness    65 y.o. female  who presents for annual exam.   Here for refill acyclovir and concern about HF and NS- miserable  PH breast cancer, early stage, also smoker and hypertension, several contraindications for estrogen  Due for pap and mammo    CSC 5 years ago      No LMP recorded. Patient is postmenopausal.  Obstetric History:  OB History        0    Para   0    Term   0       0    AB   0    Living   0       SAB   0    IAB   0    Ectopic   0    Molar   0    Multiple   0    Live Births   0          Obstetric Comments   Took birth control 6 years. Took hormone replacement therapy for 10 years.             Menstrual History:     No LMP recorded. Patient is postmenopausal.       Sexual History:       ________________________________________  Patient Active Problem List   Diagnosis   • BP (high blood pressure)   • Pulmonary emphysema (HCC)   • Malignant neoplasm of overlapping sites of right breast in female, estrogen receptor positive (HCC)   • Tobacco use disorder   • Migraine without status migrainosus, not intractable       Past Medical History:   Diagnosis Date   • Arthritis    • Breast cancer (HCC)     Right, Stage IA   • Colon polyp    • Emphysema of lung (HCC)    • Genital herpes    • History of diverticulitis    • History of gastric ulcer    • Hypertension    • Infectious viral hepatitis     B       Past Surgical History:   Procedure Laterality Date   • BREAST BIOPSY Right     malignant   • COLON RESECTION     • COLOSTOMY     • COLOSTOMY REVISION     • MASTECTOMY WITH SENTINEL NODE BIOPSY AND AXILLARY NODE DISSECTION Right 1/10/2018    Procedure: BREAST MASTECTOMY WITH SENTINEL NODE BIOPSY;   "Surgeon: Juan Peterson MD;  Location: [facility];  Service:    • PELVIC LAPAROSCOPY     • TONSILLECTOMY     • WISDOM TOOTH EXTRACTION         Social History     Tobacco Use   Smoking Status Current Every Day Smoker   • Packs/day: 1.00   • Types: Cigarettes   • Start date: 1975   Smokeless Tobacco Current User       has a current medication list which includes the following prescription(s): acyclovir, amlodipine, apremilast, chlorhexidine, fluticasone-salmeterol, losartan, omeprazole, spiriva respimat, and sumatriptan.  ________________________________________    Current contraception: post menopausal status  History of abnormal Pap smear: no  Family history of Breast cancer: PH breast cancer  Family history of uterine or ovarian cancer: no  Family History of colon cancer/colon polyps: no  History of abnormal mammogram: yes - PH breast ca      The following portions of the patient's history were reviewed and updated as appropriate: allergies, current medications, past family history, past medical history, past social history, past surgical history and problem list.    Review of Systems    Pertinent items are noted in HPI.     Objective   Physical Exam    /76   Ht 172.7 cm (67.99\")   Wt 76.2 kg (168 lb)   Breastfeeding No   BMI 25.55 kg/m²    BP Readings from Last 3 Encounters:   11/11/21 130/76   08/27/21 142/82   02/24/21 140/74      Wt Readings from Last 3 Encounters:   11/11/21 76.2 kg (168 lb)   08/27/21 75.8 kg (167 lb 3.2 oz)   02/24/21 76.8 kg (169 lb 6.4 oz)      BMI: Estimated body mass index is 25.55 kg/m² as calculated from the following:    Height as of this encounter: 172.7 cm (67.99\").    Weight as of this encounter: 76.2 kg (168 lb).      General:   alert, appears stated age and cooperative   Abdomen: soft, non-tender, without masses or organomegaly   Breast: inspection negative, no nipple discharge or bleeding, no masses or nodularity palpable   Vulva: normal   Vagina: normal " mucosa   Cervix: no cervical motion tenderness and no lesions   Uterus: normal size, mobile and non-tender   Adnexa: no mass, fullness, tenderness     Assessment:    1. Normal annual exam   Assessment     ICD-10-CM ICD-9-CM   1. Menopausal symptoms  N95.1 627.2   2. Encounter for gynecological examination without abnormal finding  Z01.419 V72.31   3. Encounter for screening mammogram for malignant neoplasm of breast  Z12.31 V76.12   4. Screening for cervical cancer  Z12.4 V76.2   5. Screening for HPV (human papillomavirus)  Z11.51 V73.81     Plan:    Plan     [x]  Mammogram request made  [x]  PAP done  []  Labs:   []  GC/Chl/TV  []  DEXA scan   []  Referral for colonoscopy:       Diagnoses and all orders for this visit:    1. Menopausal symptoms (Primary)    2. Encounter for gynecological examination without abnormal finding    3. Encounter for screening mammogram for malignant neoplasm of breast  -     Mammo Screening Bilateral With CAD; Future    4. Screening for cervical cancer  -     IGP, Apt HPV,rfx 16 / 18,45    5. Screening for HPV (human papillomavirus)  -     IGP, Apt HPV,rfx 16 / 18,45    Other orders  -     acyclovir (ZOVIRAX) 400 MG tablet; Take 1 tablet by mouth 5 (Five) Times a Day. Take no more than 5 doses a day.  Dispense: 30 tablet; Refill: 4            Counseling:  --Nutrition: Stressed importance of moderation and caloric balance, stressed fresh fruit and vegetables  --Exercise: Stressed the importance of regular exercise. 3-5 times weekly   - Discussed screening mammogram recommendations.   --Discussed benefits of screening colonoscopy- age 45 unless FH  --Discussed pap smear screening recommendations

## 2021-11-16 LAB
CYTOLOGIST CVX/VAG CYTO: NORMAL
CYTOLOGY CVX/VAG DOC CYTO: NORMAL
CYTOLOGY CVX/VAG DOC THIN PREP: NORMAL
DX ICD CODE: NORMAL
HIV 1 & 2 AB SER-IMP: NORMAL
HPV I/H RISK 4 DNA CVX QL PROBE+SIG AMP: NEGATIVE
OTHER STN SPEC: NORMAL
STAT OF ADQ CVX/VAG CYTO-IMP: NORMAL

## 2022-02-22 ENCOUNTER — OFFICE VISIT (OUTPATIENT)
Dept: OBSTETRICS AND GYNECOLOGY | Age: 67
End: 2022-02-22

## 2022-02-22 VITALS
SYSTOLIC BLOOD PRESSURE: 128 MMHG | WEIGHT: 167 LBS | DIASTOLIC BLOOD PRESSURE: 80 MMHG | BODY MASS INDEX: 25.31 KG/M2 | HEIGHT: 68 IN

## 2022-02-22 DIAGNOSIS — N89.8 VAGINAL DISCHARGE: Primary | ICD-10-CM

## 2022-02-22 DIAGNOSIS — Z13.89 SCREENING FOR BLOOD OR PROTEIN IN URINE: ICD-10-CM

## 2022-02-22 DIAGNOSIS — R30.0 DYSURIA: ICD-10-CM

## 2022-02-22 LAB
BILIRUB BLD-MCNC: NEGATIVE MG/DL
CLARITY, POC: CLEAR
COLOR UR: YELLOW
GLUCOSE UR STRIP-MCNC: NEGATIVE MG/DL
KETONES UR QL: NEGATIVE
LEUKOCYTE EST, POC: NEGATIVE
NITRITE UR-MCNC: NEGATIVE MG/ML
PH UR: 5.5 [PH] (ref 5–8)
PROT UR STRIP-MCNC: NEGATIVE MG/DL
RBC # UR STRIP: ABNORMAL /UL
SP GR UR: 1.02 (ref 1–1.03)
UROBILINOGEN UR QL: NORMAL

## 2022-02-22 PROCEDURE — 99213 OFFICE O/P EST LOW 20 MIN: CPT | Performed by: PHYSICIAN ASSISTANT

## 2022-02-22 PROCEDURE — 81003 URINALYSIS AUTO W/O SCOPE: CPT | Performed by: PHYSICIAN ASSISTANT

## 2022-02-22 RX ORDER — METRONIDAZOLE 7.5 MG/G
GEL VAGINAL NIGHTLY
Qty: 70 G | Refills: 0 | Status: SHIPPED | OUTPATIENT
Start: 2022-02-22 | End: 2022-02-27

## 2022-02-22 RX ORDER — ACYCLOVIR 400 MG/1
400 TABLET ORAL
Qty: 30 TABLET | Refills: 8 | Status: SHIPPED | OUTPATIENT
Start: 2022-02-22 | End: 2023-01-09

## 2022-02-22 NOTE — PROGRESS NOTES
"Subjective     Chief Complaint   Patient presents with   • Gynecologic Exam     c/o possible vaginal infection or bladder infection, has some discharge, was on antibiotics for bronchitis recently.       Adele Ly is a 66 y.o.  whose LMP is No LMP recorded. Patient is postmenopausal. presents with possible vaginal infection and or bladder infection    On ab recently for bronchitis  Then started with an odor and d/c  Doesn't feel like yeast  Wonders if it is a bacterial infection  Also notes some bladder issues  Wonders about infection as well    Been a rough month  Dog  recently  Nearly 12 yoa  Mom is sick and near death as well    Sees Dr Brandon West to me today      No Additional Complaints Reported    The following portions of the patient's history were reviewed and updated as appropriate:vital signs, allergies, current medications, past family history, past medical history, past social history, past surgical history and problem list      Review of Systems   Genitourinary:positive for vaginal discharge     Objective      /80   Ht 172.7 cm (68\")   Wt 75.8 kg (167 lb)   Breastfeeding No   BMI 25.39 kg/m²     Physical Exam    General:   alert, comfortable and no distress   Heart: Not performed today   Lungs: Not performed today.   Breast: Not performed today   Neck: na   Abdomen: {Not performed today   CVA: Not performed today   Pelvis: External genitalia: normal general appearance  Vaginal: normal mucosa without prolapse or lesions and no d/c noted, culture obtained  Cervix: normal appearance   Extremities: Not performed today   Neurologic: Not performed today   Psychiatric: Normal affect, judgement, and mood       Lab Review   Labs: Urinalysis - with micro     Imaging   Ultrasound - Pelvic Vaginal    Assessment/Plan     ASSESSMENT  1. Vaginal discharge    2. Screening for blood or protein in urine    3. Dysuria        PLAN  1.   Orders Placed This Encounter   Procedures   • Urine Culture - " Urine, Urine, Random Void   • NuSwab BV & Candida - Swab, Cervix   • POC Urinalysis Dipstick, Multipro       2. Medications prescribed this encounter:        New Medications Ordered This Visit   Medications   • metroNIDAZOLE (METROGEL VAGINAL) 0.75 % vaginal gel     Sig: Insert  into the vagina Every Night for 5 days.     Dispense:  70 g     Refill:  0   • acyclovir (ZOVIRAX) 400 MG tablet     Sig: Take 1 tablet by mouth 5 (Five) Times a Day. Take no more than 5 doses a day.     Dispense:  30 tablet     Refill:  8       3. Possibly BV? No obvious s/s today. Will start metrogel pv. Also recommended avoiding soaps pv and use an emollient to protect tissue. Will send urine culture as well    Pt also requested a refill of her acyclovir as well    Follow up: 9 month(s)    NIC Johnson  2/22/2022

## 2022-02-24 LAB
A VAGINAE DNA VAG QL NAA+PROBE: NORMAL SCORE
BACTERIA UR CULT: NORMAL
BACTERIA UR CULT: NORMAL
BVAB2 DNA VAG QL NAA+PROBE: NORMAL SCORE
C ALBICANS DNA VAG QL NAA+PROBE: NEGATIVE
C GLABRATA DNA VAG QL NAA+PROBE: NEGATIVE
MEGA1 DNA VAG QL NAA+PROBE: NORMAL SCORE

## 2022-03-17 RX ORDER — LOSARTAN POTASSIUM 100 MG/1
TABLET ORAL
Qty: 90 TABLET | Refills: 1 | Status: SHIPPED | OUTPATIENT
Start: 2022-03-17 | End: 2022-09-09 | Stop reason: SDUPTHER

## 2022-03-17 RX ORDER — AMLODIPINE BESYLATE 10 MG/1
TABLET ORAL
Qty: 90 TABLET | Refills: 1 | Status: SHIPPED | OUTPATIENT
Start: 2022-03-17 | End: 2022-09-12 | Stop reason: SDUPTHER

## 2022-03-17 RX ORDER — OMEPRAZOLE 20 MG/1
CAPSULE, DELAYED RELEASE ORAL
Qty: 90 CAPSULE | Refills: 1 | Status: SHIPPED | OUTPATIENT
Start: 2022-03-17 | End: 2022-09-12 | Stop reason: SDUPTHER

## 2022-05-31 ENCOUNTER — CLINICAL SUPPORT (OUTPATIENT)
Dept: OBSTETRICS AND GYNECOLOGY | Age: 67
End: 2022-05-31

## 2022-05-31 ENCOUNTER — PROCEDURE VISIT (OUTPATIENT)
Dept: OBSTETRICS AND GYNECOLOGY | Age: 67
End: 2022-05-31

## 2022-05-31 ENCOUNTER — APPOINTMENT (OUTPATIENT)
Dept: WOMENS IMAGING | Facility: HOSPITAL | Age: 67
End: 2022-05-31

## 2022-05-31 DIAGNOSIS — R35.0 URINARY FREQUENCY: Primary | ICD-10-CM

## 2022-05-31 DIAGNOSIS — Z12.31 VISIT FOR SCREENING MAMMOGRAM: Primary | ICD-10-CM

## 2022-05-31 DIAGNOSIS — Z11.2 ENCOUNTER FOR SCREENING FOR OTHER BACTERIAL DISEASE: ICD-10-CM

## 2022-05-31 LAB
BILIRUB BLD-MCNC: ABNORMAL MG/DL
CLARITY, POC: CLEAR
COLOR UR: ABNORMAL
GLUCOSE UR STRIP-MCNC: NEGATIVE MG/DL
KETONES UR QL: NEGATIVE
LEUKOCYTE EST, POC: NEGATIVE
NITRITE UR-MCNC: NEGATIVE MG/ML
PH UR: 6 [PH] (ref 5–8)
PROT UR STRIP-MCNC: ABNORMAL MG/DL
RBC # UR STRIP: ABNORMAL /UL
SP GR UR: 1.03 (ref 1–1.03)
UROBILINOGEN UR QL: NORMAL

## 2022-05-31 PROCEDURE — 77067 SCR MAMMO BI INCL CAD: CPT | Performed by: OBSTETRICS & GYNECOLOGY

## 2022-05-31 PROCEDURE — 81002 URINALYSIS NONAUTO W/O SCOPE: CPT | Performed by: OBSTETRICS & GYNECOLOGY

## 2022-05-31 PROCEDURE — 77067 SCR MAMMO BI INCL CAD: CPT | Performed by: RADIOLOGY

## 2022-05-31 PROCEDURE — 77063 BREAST TOMOSYNTHESIS BI: CPT | Performed by: OBSTETRICS & GYNECOLOGY

## 2022-05-31 PROCEDURE — 77063 BREAST TOMOSYNTHESIS BI: CPT | Performed by: RADIOLOGY

## 2022-06-02 LAB
BACTERIA UR CULT: NORMAL
BACTERIA UR CULT: NORMAL

## 2022-06-03 ENCOUNTER — TELEPHONE (OUTPATIENT)
Dept: OBSTETRICS AND GYNECOLOGY | Age: 67
End: 2022-06-03

## 2022-06-03 DIAGNOSIS — N64.89 BREAST ASYMMETRY: ICD-10-CM

## 2022-06-03 DIAGNOSIS — R92.8 ABNORMAL MAMMOGRAM: Primary | ICD-10-CM

## 2022-06-03 NOTE — TELEPHONE ENCOUNTER
Patient called back to discuss results of her last mammogram . Results were explained . Advised patient that diagnostic mammogram and limited left  breast ultrasound is recommended . Patient will call back to schedule .

## 2022-06-03 NOTE — TELEPHONE ENCOUNTER
Pt is having eye surgery in a few weeks, and is putting off the repeat imaging at this time. She states she will call back after she figures out her financials from the eye surgery.     Natalya

## 2022-09-09 ENCOUNTER — OFFICE VISIT (OUTPATIENT)
Dept: FAMILY MEDICINE CLINIC | Facility: CLINIC | Age: 67
End: 2022-09-09

## 2022-09-09 VITALS
RESPIRATION RATE: 18 BRPM | SYSTOLIC BLOOD PRESSURE: 128 MMHG | HEIGHT: 68 IN | WEIGHT: 164 LBS | BODY MASS INDEX: 24.86 KG/M2 | DIASTOLIC BLOOD PRESSURE: 74 MMHG | TEMPERATURE: 97.1 F | HEART RATE: 72 BPM | OXYGEN SATURATION: 98 %

## 2022-09-09 DIAGNOSIS — J43.9 PULMONARY EMPHYSEMA, UNSPECIFIED EMPHYSEMA TYPE: ICD-10-CM

## 2022-09-09 DIAGNOSIS — I10 PRIMARY HYPERTENSION: Primary | ICD-10-CM

## 2022-09-09 DIAGNOSIS — F17.200 TOBACCO USE DISORDER: ICD-10-CM

## 2022-09-09 PROCEDURE — 99213 OFFICE O/P EST LOW 20 MIN: CPT | Performed by: INTERNAL MEDICINE

## 2022-09-09 RX ORDER — LOSARTAN POTASSIUM 100 MG/1
100 TABLET ORAL DAILY
Qty: 90 TABLET | Refills: 1 | Status: SHIPPED | OUTPATIENT
Start: 2022-09-09 | End: 2023-03-10 | Stop reason: SDUPTHER

## 2022-09-09 RX ORDER — TIOTROPIUM BROMIDE INHALATION SPRAY 3.12 UG/1
2 SPRAY, METERED RESPIRATORY (INHALATION)
Qty: 4 G | Refills: 11 | Status: SHIPPED | OUTPATIENT
Start: 2022-09-09

## 2022-09-09 RX ORDER — AMLODIPINE BESYLATE 10 MG/1
10 TABLET ORAL DAILY
Qty: 90 TABLET | Refills: 1 | Status: CANCELLED | OUTPATIENT
Start: 2022-09-09

## 2022-09-09 RX ORDER — OMEPRAZOLE 20 MG/1
20 CAPSULE, DELAYED RELEASE ORAL DAILY
Qty: 90 CAPSULE | Refills: 1 | Status: CANCELLED | OUTPATIENT
Start: 2022-09-09

## 2022-09-12 RX ORDER — OMEPRAZOLE 20 MG/1
20 CAPSULE, DELAYED RELEASE ORAL DAILY
Qty: 90 CAPSULE | Refills: 1 | Status: SHIPPED | OUTPATIENT
Start: 2022-09-12 | End: 2023-03-10 | Stop reason: SDUPTHER

## 2022-09-12 RX ORDER — AMLODIPINE BESYLATE 10 MG/1
10 TABLET ORAL DAILY
Qty: 90 TABLET | Refills: 1 | Status: SHIPPED | OUTPATIENT
Start: 2022-09-12 | End: 2023-03-10 | Stop reason: SDUPTHER

## 2022-09-12 NOTE — TELEPHONE ENCOUNTER
Caller: Adele Ly    Relationship: Self    Best call back number: [phone]     Requested Prescriptions: amLODIPine (NORVASC) 10 MG tablet    omeprazole (priLOSEC) 20 MG capsule    Pharmacy where request should be sent: [facility] [address] [place] [address] [address] [place] [address] RD AT [facility] - [phone]  - [phone] FX     Additional details provided by patient:     Does the patient have less than a 3 day supply:  [] Yes  [x] No    Arely Dow Rep   09/12/22 12:15 EDT

## 2022-09-12 NOTE — TELEPHONE ENCOUNTER
Rx Refill Note  Requested Prescriptions     Pending Prescriptions Disp Refills   • amLODIPine (NORVASC) 10 MG tablet 90 tablet 1     Sig: Take 1 tablet by mouth Daily.   • omeprazole (priLOSEC) 20 MG capsule 90 capsule 1     Sig: Take 1 capsule by mouth Daily.      Last office visit with prescribing clinician: 9/9/2022      Next office visit with prescribing clinician: 3/10/2023            Marcia Venegas MA  09/12/22, 14:27 EDT

## 2022-09-27 NOTE — PROGRESS NOTES
Subjective   Adele Ly is a 66 y.o. female. Patient is here today for   Chief Complaint   Patient presents with   • Med Management     MED REFILLS           Vitals:    09/09/22 1535   BP: 128/74   Pulse: 72   Resp: 18   Temp: 97.1 °F (36.2 °C)   SpO2: 98%     Body mass index is 24.94 kg/m².      Past Medical History:   Diagnosis Date   • Arthritis    • Breast cancer (HCC)     Right, Stage IA   • Colon polyp    • Emphysema of lung (HCC)    • Genital herpes    • History of diverticulitis    • History of gastric ulcer    • Hypertension    • Infectious viral hepatitis     B      Allergies   Allergen Reactions   • Penicillins Swelling     THROAT SWELLS  Other reaction(s): anaphylaxis  THROAT SWELLS  THROAT SWELLS   • Latex Rash      Social History     Socioeconomic History   • Marital status:    • Number of children: 0   Tobacco Use   • Smoking status: Current Every Day Smoker     Packs/day: 1.00     Types: Cigarettes     Start date: 1975   • Smokeless tobacco: Current User   Vaping Use   • Vaping Use: Never used   Substance and Sexual Activity   • Alcohol use: Yes     Comment: COUPLE DAYS A WEEK , BEER   • Drug use: No   • Sexual activity: Defer     Birth control/protection: Post-menopausal        Current Outpatient Medications:   •  acyclovir (ZOVIRAX) 400 MG tablet, Take 1 tablet by mouth 5 (Five) Times a Day. Take no more than 5 doses a day., Disp: 30 tablet, Rfl: 8  •  Apremilast (OTEZLA PO), Take 1 tablet/day by mouth 2 (Two) Times a Day., Disp: , Rfl:   •  chlorhexidine (PERIDEX) 0.12 % solution, , Disp: , Rfl:   •  losartan (COZAAR) 100 MG tablet, Take 1 tablet by mouth Daily., Disp: 90 tablet, Rfl: 1  •  Spiriva Respimat 2.5 MCG/ACT aerosol solution inhaler, Inhale 2 puffs Daily., Disp: 4 g, Rfl: 11  •  SUMAtriptan (IMITREX) 50 MG tablet, Take one tablet at onset of headache. May repeat dose one time in 2 hours if headache not relieved., Disp: 9 tablet, Rfl: 2  •  amLODIPine (NORVASC) 10 MG tablet,  Take 1 tablet by mouth Daily., Disp: 90 tablet, Rfl: 1  •  omeprazole (priLOSEC) 20 MG capsule, Take 1 capsule by mouth Daily., Disp: 90 capsule, Rfl: 1     Objective     History of Present Illness  She is here for medication refills.    Is great to see her but I do not see her very often.  I saw her last about 13 months ago.  She takes medications for chronic obstructive pulmonary disease, essential hypertension.    She tells me that she feels well.  She still smokes cigarettes.           Review of Systems   Constitutional: Negative.    HENT: Negative.    Respiratory: Negative.    Cardiovascular: Negative.    Musculoskeletal: Negative.    Psychiatric/Behavioral: Negative.        Physical Exam  Constitutional:       Appearance: Normal appearance.   Neck:      Vascular: No carotid bruit.   Cardiovascular:      Rate and Rhythm: Regular rhythm.      Heart sounds: Normal heart sounds. No murmur heard.    No gallop.   Pulmonary:      Effort: No respiratory distress.      Breath sounds: Normal breath sounds. No wheezing or rales.   Neurological:      Mental Status: She is alert and oriented to person, place, and time.   Psychiatric:         Behavior: Behavior normal.         Thought Content: Thought content normal.           Problems Addressed this Visit        Cardiac and Vasculature    Hypertension - Primary    Relevant Medications    losartan (COZAAR) 100 MG tablet       Pulmonary and Pneumonias    Pulmonary emphysema (HCC)    Relevant Medications    Spiriva Respimat 2.5 MCG/ACT aerosol solution inhaler       Tobacco    Tobacco use disorder      Diagnoses       Codes Comments    Primary hypertension    -  Primary ICD-10-CM: I10  ICD-9-CM: 401.9     Pulmonary emphysema, unspecified emphysema type (HCC)     ICD-10-CM: J43.9  ICD-9-CM: 492.8     Tobacco use disorder     ICD-10-CM: F17.200  ICD-9-CM: 305.1             PLAN  She continues to smoke cigarettes.  She  Discussed smoking cessation for greater than 3 minutes  today.    She has pulmonary emphysema.  She finds that Spiriva Respimat works fairly well for her.    Her hypertension is well controlled.    She is overdue for an annual physical exam.  I asked her to arrange 1.  No follow-ups on file.

## 2022-10-20 DIAGNOSIS — N64.89 BREAST ASYMMETRY: Primary | ICD-10-CM

## 2022-11-01 ENCOUNTER — APPOINTMENT (OUTPATIENT)
Dept: WOMENS IMAGING | Facility: HOSPITAL | Age: 67
End: 2022-11-01

## 2022-11-01 PROCEDURE — G0279 TOMOSYNTHESIS, MAMMO: HCPCS | Performed by: RADIOLOGY

## 2022-11-01 PROCEDURE — 76642 ULTRASOUND BREAST LIMITED: CPT | Performed by: RADIOLOGY

## 2022-11-01 PROCEDURE — 77065 DX MAMMO INCL CAD UNI: CPT | Performed by: RADIOLOGY

## 2022-11-01 PROCEDURE — 77061 BREAST TOMOSYNTHESIS UNI: CPT | Performed by: RADIOLOGY

## 2022-12-13 ENCOUNTER — OFFICE VISIT (OUTPATIENT)
Dept: ORTHOPEDIC SURGERY | Facility: CLINIC | Age: 67
End: 2022-12-13

## 2022-12-13 VITALS — HEIGHT: 67 IN | WEIGHT: 167.4 LBS | BODY MASS INDEX: 26.27 KG/M2 | TEMPERATURE: 96.4 F

## 2022-12-13 DIAGNOSIS — M19.011 PRIMARY LOCALIZED OSTEOARTHROSIS OF RIGHT SHOULDER REGION: Primary | ICD-10-CM

## 2022-12-13 PROCEDURE — 99204 OFFICE O/P NEW MOD 45 MIN: CPT | Performed by: ORTHOPAEDIC SURGERY

## 2022-12-13 PROCEDURE — 20610 DRAIN/INJ JOINT/BURSA W/O US: CPT | Performed by: ORTHOPAEDIC SURGERY

## 2022-12-13 PROCEDURE — 73030 X-RAY EXAM OF SHOULDER: CPT | Performed by: ORTHOPAEDIC SURGERY

## 2022-12-13 RX ORDER — MELOXICAM 15 MG/1
TABLET ORAL
Qty: 30 TABLET | Refills: 0 | Status: SHIPPED | OUTPATIENT
Start: 2022-12-13

## 2022-12-13 RX ORDER — METHYLPREDNISOLONE ACETATE 80 MG/ML
80 INJECTION, SUSPENSION INTRA-ARTICULAR; INTRALESIONAL; INTRAMUSCULAR; SOFT TISSUE
Status: COMPLETED | OUTPATIENT
Start: 2022-12-13 | End: 2022-12-13

## 2022-12-13 RX ADMIN — METHYLPREDNISOLONE ACETATE 80 MG: 80 INJECTION, SUSPENSION INTRA-ARTICULAR; INTRALESIONAL; INTRAMUSCULAR; SOFT TISSUE at 10:28

## 2022-12-13 NOTE — PROGRESS NOTES
New Right Shoulder      Patient: Adele Ly        YOB: 1955    Medical Record Number: [id number]        Chief Complaints: Right shoulder pain      History of Present Illness: This is a this is a 67-year-old female who is right-hand dominant presents with right shoulder pain this been ongoing for 4 months she denies any history of injury that she can recall she works at [employer] and does have have to lift a lot of things she states they are not heavy but is very repetitive she had a mastectomy in 2018 she wears a heavy prosthesis but does not really think it is related to that.  Her symptoms are moderate intermittent aching worse with activity somewhat better with rest does radiate down into the biceps area.  Her past medical history is remarkable for hypertension emphysema breast cancer arthritis      Allergies:   Allergies   Allergen Reactions   • Penicillins Swelling     THROAT SWELLS  Other reaction(s): anaphylaxis  THROAT SWELLS  THROAT SWELLS   • Latex Rash       Medications:   Home Medications:  Current Outpatient Medications on File Prior to Visit   Medication Sig   • acyclovir (ZOVIRAX) 400 MG tablet Take 1 tablet by mouth 5 (Five) Times a Day. Take no more than 5 doses a day.   • amLODIPine (NORVASC) 10 MG tablet Take 1 tablet by mouth Daily.   • Apremilast (OTEZLA PO) Take 1 tablet/day by mouth 2 (Two) Times a Day.   • chlorhexidine (PERIDEX) 0.12 % solution    • losartan (COZAAR) 100 MG tablet Take 1 tablet by mouth Daily.   • omeprazole (priLOSEC) 20 MG capsule Take 1 capsule by mouth Daily.   • Spiriva Respimat 2.5 MCG/ACT aerosol solution inhaler Inhale 2 puffs Daily.   • SUMAtriptan (IMITREX) 50 MG tablet Take one tablet at onset of headache. May repeat dose one time in 2 hours if headache not relieved.     No current facility-administered medications on file prior to visit.     Current Medications:  Scheduled Meds:  Continuous Infusions:No current facility-administered  medications for this visit.    PRN Meds:.    Past Medical History:   Diagnosis Date   • Arthritis    • Breast cancer (HCC)     Right, Stage IA   • Colon polyp    • Emphysema of lung (HCC)    • Genital herpes    • History of diverticulitis    • History of gastric ulcer    • Hypertension    • Infectious viral hepatitis     B        Past Surgical History:   Procedure Laterality Date   • BREAST BIOPSY Right 2017    malignant   • COLON RESECTION     • COLOSTOMY     • COLOSTOMY REVISION     • MASTECTOMY WITH SENTINEL NODE BIOPSY AND AXILLARY NODE DISSECTION Right 1/10/2018    Procedure: BREAST MASTECTOMY WITH SENTINEL NODE BIOPSY;  Surgeon: Juan Peterson MD;  Location: [facility];  Service:    • PELVIC LAPAROSCOPY     • TONSILLECTOMY     • WISDOM TOOTH EXTRACTION          Social History     Occupational History     Employer: [employer]   Tobacco Use   • Smoking status: Every Day     Packs/day: 1.00     Types: Cigarettes     Start date: 1975   • Smokeless tobacco: Current   Vaping Use   • Vaping Use: Never used   Substance and Sexual Activity   • Alcohol use: Yes     Comment: COUPLE DAYS A WEEK , BEER   • Drug use: No   • Sexual activity: Defer     Birth control/protection: Post-menopausal      Social History     Social History Narrative   • Not on file        Family History   Problem Relation Age of Onset   • Diabetes Father    • Hypertension Father    • No Known Problems Mother    • No Known Problems Sister    • No Known Problems Brother    • No Known Problems Daughter    • No Known Problems Son    • Breast cancer Maternal Grandmother 60   • No Known Problems Paternal Grandmother    • Breast cancer Maternal Aunt 60   • Diabetes Paternal Aunt    • Ovarian cancer Paternal Aunt 41   • Colon cancer Maternal Uncle    • COPD Paternal Uncle    • BRCA 1/2 Neg Hx    • Endometrial cancer Neg Hx    • Malig Hyperthermia Neg Hx              Review of Systems:     Review of Systems      Physical Exam: 67 y.o.  "female  General Appearance:    Alert, cooperative, in no acute distress                   Vitals:    12/13/22 1001   Temp: 96.4 °F (35.8 °C)   Weight: 75.9 kg (167 lb 6.4 oz)   Height: 170.2 cm (67\")   PainSc:   7      Patient is alert and read ×3 no acute distress appears her above-listed at height weight and age.  Affect is normal respiratory rate is normal unlabored. Heart rate regular rate rhythm, sclera, dentition and hearing are normal for the purpose of this exam.    Ortho Exam Physical exam the right shoulder reveals no overlying skin changes no lymphedema lymphadenopathy the patient can actively flex to about 150 passively I get them to 160 abduction is similar external rotation is 40 internal rotation to there buttock.  Rotator cuff strength is 4+ over 5 with isometric strength testing no overlying skin changes.  Patient has reasonable cervical range of motion for their age no radicular symptoms and a normal elbow exam.  There are good distal pulses.    Large Joint Arthrocentesis: R glenohumeral  Date/Time: 12/13/2022 10:28 AM  Consent given by: patient  Site marked: site marked  Timeout: Immediately prior to procedure a time out was called to verify the correct patient, procedure, equipment, support staff and site/side marked as required   Supporting Documentation  Indications: pain   Procedure Details  Location: shoulder - R glenohumeral  Preparation: Patient was prepped and draped in the usual sterile fashion  Needle gauge: 21G.  Approach: posterior  Medications administered: 80 mg methylPREDNISolone acetate 80 MG/ML; 2 mL lidocaine (cardiac)  Patient tolerance: patient tolerated the procedure well with no immediate complications                Radiology:   AP, Scapular Y and Axillary Lateral of the right shoulder were ordered/reviewed to evauate shoulder pain.  I have no comparative films she does have degenerative changes at the glenohumeral joint with near complete loss of joint space as best seen on " axillary lateral no acute pathology    Assessment/Plan: Right shoulder pain that she has significant degenerative changes I suspect this is the issue plan is to proceed with a glenohumeral injection I will also start on some meloxicam with strict precautions for short period of time we talked a little bit about options in the future of repeat injections versus arthroplasty she does understand these  Cortisone Injection. See procedure note.  Cortisone Injection for DIAGNOSTIC and THERAPUTIC purposes.

## 2023-01-09 RX ORDER — ACYCLOVIR 400 MG/1
TABLET ORAL
Qty: 30 TABLET | Refills: 8 | Status: SHIPPED | OUTPATIENT
Start: 2023-01-09

## 2023-03-03 DIAGNOSIS — I10 PRIMARY HYPERTENSION: Primary | ICD-10-CM

## 2023-03-09 LAB
ALBUMIN SERPL-MCNC: 4.3 G/DL (ref 3.5–5.2)
ALBUMIN/GLOB SERPL: 1.7 G/DL
ALP SERPL-CCNC: 73 U/L (ref 39–117)
ALT SERPL-CCNC: 7 U/L (ref 1–33)
APPEARANCE UR: CLEAR
AST SERPL-CCNC: 12 U/L (ref 1–32)
BACTERIA #/AREA URNS HPF: ABNORMAL /HPF
BASOPHILS # BLD AUTO: 0.07 10*3/MM3 (ref 0–0.2)
BASOPHILS NFR BLD AUTO: 0.7 % (ref 0–1.5)
BILIRUB SERPL-MCNC: 0.4 MG/DL (ref 0–1.2)
BILIRUB UR QL STRIP: NEGATIVE
BUN SERPL-MCNC: 15 MG/DL (ref 8–23)
BUN/CREAT SERPL: 23.4 (ref 7–25)
CALCIUM SERPL-MCNC: 9.9 MG/DL (ref 8.6–10.5)
CASTS URNS MICRO: ABNORMAL
CHLORIDE SERPL-SCNC: 104 MMOL/L (ref 98–107)
CHOLEST SERPL-MCNC: 197 MG/DL (ref 0–200)
CHOLEST/HDLC SERPL: 3.34 {RATIO}
CO2 SERPL-SCNC: 27.2 MMOL/L (ref 22–29)
COLOR UR: YELLOW
CREAT SERPL-MCNC: 0.64 MG/DL (ref 0.57–1)
EGFRCR SERPLBLD CKD-EPI 2021: 98.2 ML/MIN/1.73
EOSINOPHIL # BLD AUTO: 0.19 10*3/MM3 (ref 0–0.4)
EOSINOPHIL NFR BLD AUTO: 1.9 % (ref 0.3–6.2)
EPI CELLS #/AREA URNS HPF: ABNORMAL /HPF
ERYTHROCYTE [DISTWIDTH] IN BLOOD BY AUTOMATED COUNT: 13 % (ref 12.3–15.4)
GLOBULIN SER CALC-MCNC: 2.5 GM/DL
GLUCOSE SERPL-MCNC: 97 MG/DL (ref 65–99)
GLUCOSE UR QL STRIP: NEGATIVE
HCT VFR BLD AUTO: 43.4 % (ref 34–46.6)
HDLC SERPL-MCNC: 59 MG/DL (ref 40–60)
HGB BLD-MCNC: 14.8 G/DL (ref 12–15.9)
HGB UR QL STRIP: ABNORMAL
IMM GRANULOCYTES # BLD AUTO: 0.05 10*3/MM3 (ref 0–0.05)
IMM GRANULOCYTES NFR BLD AUTO: 0.5 % (ref 0–0.5)
KETONES UR QL STRIP: NEGATIVE
LDLC SERPL CALC-MCNC: 118 MG/DL (ref 0–100)
LEUKOCYTE ESTERASE UR QL STRIP: ABNORMAL
LYMPHOCYTES # BLD AUTO: 2.39 10*3/MM3 (ref 0.7–3.1)
LYMPHOCYTES NFR BLD AUTO: 23.6 % (ref 19.6–45.3)
MCH RBC QN AUTO: 30.6 PG (ref 26.6–33)
MCHC RBC AUTO-ENTMCNC: 34.1 G/DL (ref 31.5–35.7)
MCV RBC AUTO: 89.9 FL (ref 79–97)
MONOCYTES # BLD AUTO: 0.68 10*3/MM3 (ref 0.1–0.9)
MONOCYTES NFR BLD AUTO: 6.7 % (ref 5–12)
NEUTROPHILS # BLD AUTO: 6.76 10*3/MM3 (ref 1.7–7)
NEUTROPHILS NFR BLD AUTO: 66.6 % (ref 42.7–76)
NITRITE UR QL STRIP: NEGATIVE
NRBC BLD AUTO-RTO: 0 /100 WBC (ref 0–0.2)
PH UR STRIP: 6 [PH] (ref 5–8)
PLATELET # BLD AUTO: 265 10*3/MM3 (ref 140–450)
POTASSIUM SERPL-SCNC: 4.2 MMOL/L (ref 3.5–5.2)
PROT SERPL-MCNC: 6.8 G/DL (ref 6–8.5)
PROT UR QL STRIP: ABNORMAL
RBC # BLD AUTO: 4.83 10*6/MM3 (ref 3.77–5.28)
RBC #/AREA URNS HPF: ABNORMAL /HPF
SODIUM SERPL-SCNC: 141 MMOL/L (ref 136–145)
SP GR UR STRIP: 1.01 (ref 1–1.03)
TRIGL SERPL-MCNC: 114 MG/DL (ref 0–150)
UROBILINOGEN UR STRIP-MCNC: ABNORMAL MG/DL
VLDLC SERPL CALC-MCNC: 20 MG/DL (ref 5–40)
WBC # BLD AUTO: 10.14 10*3/MM3 (ref 3.4–10.8)
WBC #/AREA URNS HPF: ABNORMAL /HPF

## 2023-03-10 ENCOUNTER — OFFICE VISIT (OUTPATIENT)
Dept: FAMILY MEDICINE CLINIC | Facility: CLINIC | Age: 68
End: 2023-03-10
Payer: COMMERCIAL

## 2023-03-10 VITALS
BODY MASS INDEX: 25.46 KG/M2 | SYSTOLIC BLOOD PRESSURE: 126 MMHG | WEIGHT: 168 LBS | TEMPERATURE: 98.7 F | DIASTOLIC BLOOD PRESSURE: 78 MMHG | OXYGEN SATURATION: 98 % | HEART RATE: 79 BPM | HEIGHT: 68 IN

## 2023-03-10 DIAGNOSIS — F17.200 TOBACCO USE DISORDER: ICD-10-CM

## 2023-03-10 DIAGNOSIS — C50.811 MALIGNANT NEOPLASM OF OVERLAPPING SITES OF RIGHT BREAST IN FEMALE, ESTROGEN RECEPTOR POSITIVE: ICD-10-CM

## 2023-03-10 DIAGNOSIS — Z17.0 MALIGNANT NEOPLASM OF OVERLAPPING SITES OF RIGHT BREAST IN FEMALE, ESTROGEN RECEPTOR POSITIVE: ICD-10-CM

## 2023-03-10 DIAGNOSIS — J43.9 PULMONARY EMPHYSEMA, UNSPECIFIED EMPHYSEMA TYPE: ICD-10-CM

## 2023-03-10 DIAGNOSIS — I10 PRIMARY HYPERTENSION: Primary | ICD-10-CM

## 2023-03-10 PROCEDURE — 99213 OFFICE O/P EST LOW 20 MIN: CPT | Performed by: INTERNAL MEDICINE

## 2023-03-10 RX ORDER — SULFAMETHOXAZOLE AND TRIMETHOPRIM 800; 160 MG/1; MG/1
1 TABLET ORAL 2 TIMES DAILY
Qty: 14 TABLET | Refills: 0 | Status: SHIPPED | OUTPATIENT
Start: 2023-03-10

## 2023-03-10 RX ORDER — LOSARTAN POTASSIUM 100 MG/1
100 TABLET ORAL DAILY
Qty: 90 TABLET | Refills: 1 | Status: SHIPPED | OUTPATIENT
Start: 2023-03-10

## 2023-03-10 RX ORDER — OMEPRAZOLE 20 MG/1
20 CAPSULE, DELAYED RELEASE ORAL DAILY
Qty: 90 CAPSULE | Refills: 1 | Status: SHIPPED | OUTPATIENT
Start: 2023-03-10

## 2023-03-10 RX ORDER — AMLODIPINE BESYLATE 10 MG/1
10 TABLET ORAL DAILY
Qty: 90 TABLET | Refills: 1 | Status: SHIPPED | OUTPATIENT
Start: 2023-03-10

## 2023-03-10 NOTE — PROGRESS NOTES
Subjective   Adele Ly is a 67 y.o. female. Patient is here today for   Chief Complaint   Patient presents with   • Hypertension          Vitals:    03/10/23 1522   BP: 126/78   Pulse: 79   Temp: 98.7 °F (37.1 °C)   SpO2: 98%     Body mass index is 25.54 kg/m².      Past Medical History:   Diagnosis Date   • Arthritis    • Breast cancer (HCC)     Right, Stage IA   • Colon polyp    • Emphysema of lung (HCC)    • Genital herpes    • History of diverticulitis    • History of gastric ulcer    • Hypertension    • Infectious viral hepatitis     B      Allergies   Allergen Reactions   • Penicillins Swelling     THROAT SWELLS  Other reaction(s): anaphylaxis  THROAT SWELLS  THROAT SWELLS   • Latex Rash      Social History     Socioeconomic History   • Marital status:    • Number of children: 0   Tobacco Use   • Smoking status: Every Day     Packs/day: 1.00     Types: Cigarettes     Start date: 1975   • Smokeless tobacco: Current   Vaping Use   • Vaping Use: Never used   Substance and Sexual Activity   • Alcohol use: Yes     Comment: COUPLE DAYS A WEEK , BEER   • Drug use: No   • Sexual activity: Defer     Birth control/protection: Post-menopausal        Current Outpatient Medications:   •  amLODIPine (NORVASC) 10 MG tablet, Take 1 tablet by mouth Daily., Disp: 90 tablet, Rfl: 1  •  Apremilast (OTEZLA PO), Take 1 tablet/day by mouth 2 (Two) Times a Day., Disp: , Rfl:   •  chlorhexidine (PERIDEX) 0.12 % solution, , Disp: , Rfl:   •  losartan (COZAAR) 100 MG tablet, Take 1 tablet by mouth Daily., Disp: 90 tablet, Rfl: 1  •  meloxicam (MOBIC) 15 MG tablet, 1 PO Daily with food., Disp: 30 tablet, Rfl: 0  •  omeprazole (priLOSEC) 20 MG capsule, Take 1 capsule by mouth Daily., Disp: 90 capsule, Rfl: 1  •  Spiriva Respimat 2.5 MCG/ACT aerosol solution inhaler, Inhale 2 puffs Daily., Disp: 4 g, Rfl: 11  •  acyclovir (ZOVIRAX) 400 MG tablet, TAKE ONE TABLET BY MOUTH FIVE TIMES A DAY, NO MORE THAN 5 DOSES PER DAY, Disp: 30  tablet, Rfl: 8  •  sulfamethoxazole-trimethoprim (Bactrim DS) 800-160 MG per tablet, Take 1 tablet by mouth 2 (Two) Times a Day., Disp: 14 tablet, Rfl: 0  •  SUMAtriptan (IMITREX) 50 MG tablet, Take one tablet at onset of headache. May repeat dose one time in 2 hours if headache not relieved., Disp: 9 tablet, Rfl: 2     Objective     History of Present Illness  She is here for a follow-up on hypertension today.  It was going to be a physical but she was more than 15 minutes late arriving by the time she was roomed only had 10 minutes with her mother stretch that out to 25.    In any case she is being followed up with gynecology routinely.  She gets mammograms and bone densities and Pap smears (if indicated).    She continues to smoke cigarettes.          Hypertension         Review of Systems   Constitutional: Negative.    HENT: Positive for postnasal drip, rhinorrhea and sinus pain.    Eyes: Negative.    Respiratory: Negative.    Cardiovascular: Negative.    Musculoskeletal: Negative.    Psychiatric/Behavioral: Negative.        Physical Exam  Constitutional:       Appearance: Normal appearance.   Cardiovascular:      Rate and Rhythm: Regular rhythm.      Heart sounds: Normal heart sounds. No murmur heard.    No gallop.   Pulmonary:      Breath sounds: Normal breath sounds.   Skin:     General: Skin is warm and dry.   Neurological:      Mental Status: She is alert and oriented to person, place, and time.   Psychiatric:         Mood and Affect: Mood normal.         Behavior: Behavior normal.           Problems Addressed this Visit        Cardiac and Vasculature    Hypertension - Primary    Relevant Medications    amLODIPine (NORVASC) 10 MG tablet    losartan (COZAAR) 100 MG tablet       Hematology and Neoplasia    Malignant neoplasm of overlapping sites of right breast in female, estrogen receptor positive (HCC)       Pulmonary and Pneumonias    Pulmonary emphysema (HCC)       Tobacco    Tobacco use disorder    Diagnoses       Codes Comments    Primary hypertension    -  Primary ICD-10-CM: I10  ICD-9-CM: 401.9     Malignant neoplasm of overlapping sites of right breast in female, estrogen receptor positive (HCC)     ICD-10-CM: C50.811, Z17.0  ICD-9-CM: 174.8, V86.0     Tobacco use disorder     ICD-10-CM: F17.200  ICD-9-CM: 305.1     Pulmonary emphysema, unspecified emphysema type (HCC)     ICD-10-CM: J43.9  ICD-9-CM: 492.8             PLAN  Her hypertension is well controlled.    I suggested that she go for lung cancer screening.  She does not really want to do that for the time being.    She needs to follow-up for an annual physical exam at some time.  I advised her to do that in about 6 months.  Fasting labs prior to visit should include: Lipid profile, comprehensive metabolic panel, CBC, And urinalysis.    I sent out a prescription for Bactrim DS 1 p.o. twice daily to treat herMaxillary rhinosinusitis.   No follow-ups on file.

## 2023-06-13 ENCOUNTER — TELEPHONE (OUTPATIENT)
Dept: FAMILY MEDICINE CLINIC | Facility: CLINIC | Age: 68
End: 2023-06-13

## 2023-06-13 NOTE — TELEPHONE ENCOUNTER
Caller: Adele Ly    Relationship: Self    Best call back number: [phone]     What is the best time to reach you: ANY TIME    Who are you requesting to speak with (clinical staff, provider,  specific staff member): CLINICAL STAFF    What was the call regarding: PATIENT STATES THAT RONN WITH [facility] SENT A FAX TO OFFICE ON 6/2/23 FOR AN ORDER FOR HER TO GET A FITTING AND A BRA.  PATIENT SAYS THAT SHE HAD A MASTECTOMY IN 2018.  RONN IS WAITING ON A REPLY FROM DR. HARDEEP RUSH SO THAT SHE CAN SEND THE ORDER THROUGH PATIENT'S INSURANCE. RONN'S PHONE NUMBER [phone].  PLEASE CALL PATIENT WITH STATUS.    Is it okay if the provider responds through [employer]:     PLEASE ADVISE.

## 2023-06-14 NOTE — TELEPHONE ENCOUNTER
Called pt and informed her that the order and paperwork has been faxed over to the [facility]. Pt verbally understood.

## 2023-06-16 ENCOUNTER — TELEPHONE (OUTPATIENT)
Dept: FAMILY MEDICINE CLINIC | Facility: CLINIC | Age: 68
End: 2023-06-16

## 2023-06-16 NOTE — TELEPHONE ENCOUNTER
Caller: Adele Ly    Relationship: Self    Best call back number: [phone]     What is the medical concern/diagnosis:      What specialty or service is being requested:  LYMPHEDEMA  MANAGEMENT     What is the provider, practice or medical service name: LYMPHEDEMA  MANAGEMENT    What is the office location:      What is the office phone number:      Any additional details:  PATIENT TALKED WITH BREAST SURGEON DR. TALAVERA HE STATED PATIENT NEEDS REFERRAL FOR LYMPHEDEMA MANAGEMENT.  CAN DR RUSH PLEASE GIVE PATIENT A REFERRAL FOR LYMPHEDEMA MANAGEMENT AS SOON AS POSSIBLE.  IF ANY QUESTIONS, PLEASE CALL PATIENT.

## 2023-06-26 NOTE — TELEPHONE ENCOUNTER
Attempted to contact pt regarding referral. Left a VM to call back.     HUB TO READ:  REFERRAL HAS BEEN PLACED AND SHE SHOULD BE GETTING A CALL FROM SCHEDULING.

## 2023-09-04 DIAGNOSIS — I10 PRIMARY HYPERTENSION: ICD-10-CM

## 2023-09-05 RX ORDER — LOSARTAN POTASSIUM 100 MG/1
TABLET ORAL
Qty: 90 TABLET | Refills: 1 | Status: SHIPPED | OUTPATIENT
Start: 2023-09-05

## 2023-09-05 RX ORDER — AMLODIPINE BESYLATE 10 MG/1
TABLET ORAL
Qty: 90 TABLET | Refills: 1 | Status: SHIPPED | OUTPATIENT
Start: 2023-09-05

## 2023-09-05 RX ORDER — OMEPRAZOLE 20 MG/1
CAPSULE, DELAYED RELEASE ORAL
Qty: 90 CAPSULE | Refills: 1 | Status: SHIPPED | OUTPATIENT
Start: 2023-09-05

## 2023-10-09 RX ORDER — TIOTROPIUM BROMIDE INHALATION SPRAY 3.12 UG/1
2 SPRAY, METERED RESPIRATORY (INHALATION) DAILY
Qty: 4 G | Refills: 11 | Status: SHIPPED | OUTPATIENT
Start: 2023-10-09

## 2023-10-16 RX ORDER — ACYCLOVIR 400 MG/1
TABLET ORAL
Qty: 30 TABLET | Refills: 8 | Status: SHIPPED | OUTPATIENT
Start: 2023-10-16

## 2024-02-23 DIAGNOSIS — I10 PRIMARY HYPERTENSION: ICD-10-CM

## 2024-02-23 RX ORDER — OMEPRAZOLE 20 MG/1
20 CAPSULE, DELAYED RELEASE ORAL DAILY
Qty: 7 CAPSULE | Refills: 0 | Status: SHIPPED | OUTPATIENT
Start: 2024-02-23

## 2024-02-23 RX ORDER — LOSARTAN POTASSIUM 100 MG/1
100 TABLET ORAL DAILY
Qty: 7 TABLET | Refills: 0 | Status: SHIPPED | OUTPATIENT
Start: 2024-02-23

## 2024-02-23 RX ORDER — AMLODIPINE BESYLATE 10 MG/1
10 TABLET ORAL DAILY
Qty: 7 TABLET | Refills: 0 | Status: SHIPPED | OUTPATIENT
Start: 2024-02-23

## 2024-02-23 NOTE — TELEPHONE ENCOUNTER
Caller: Adele Ly    Relationship: Self    Best call back number: [phone]     Requested Prescriptions:   Requested Prescriptions     Pending Prescriptions Disp Refills    amLODIPine (NORVASC) 10 MG tablet 90 tablet 1     Sig: Take 1 tablet by mouth Daily.    losartan (COZAAR) 100 MG tablet 90 tablet 1     Sig: Take 1 tablet by mouth Daily.    omeprazole (priLOSEC) 20 MG capsule 90 capsule 1     Sig: Take 1 capsule by mouth Daily.        Pharmacy where request should be sent: [facility] [id number] [address] AT [facility] [phone]  - [phone] FX     Last office visit with prescribing clinician: 3/10/2023   Last telemedicine visit with prescribing clinician: Visit date not found   Next office visit with prescribing clinician: Visit date not found     Additional details provided by patient: PATIENT STATED THAT SHE NO LONGER HAS HER JOB SO SHE DOES NOT HAVE MEDICAL INSURANCE BUT SHE IS WORKING ON IT. PLEASE ADVISE.     Does the patient have less than a 3 day supply:  [] Yes  [x] No    Would you like a call back once the refill request has been completed: [] Yes [x] No    If the office needs to give you a call back, can they leave a voicemail: [] Yes [x] No    Arely Earl Rep   02/23/24 11:59 EST

## 2024-03-01 DIAGNOSIS — I10 PRIMARY HYPERTENSION: ICD-10-CM

## 2024-03-01 RX ORDER — LOSARTAN POTASSIUM 100 MG/1
100 TABLET ORAL DAILY
Qty: 90 TABLET | OUTPATIENT
Start: 2024-03-01

## 2024-03-01 RX ORDER — AMLODIPINE BESYLATE 10 MG/1
10 TABLET ORAL DAILY
Qty: 30 TABLET | Refills: 0 | Status: SHIPPED | OUTPATIENT
Start: 2024-03-01

## 2024-03-01 RX ORDER — OMEPRAZOLE 20 MG/1
20 CAPSULE, DELAYED RELEASE ORAL DAILY
Qty: 90 CAPSULE | OUTPATIENT
Start: 2024-03-01

## 2024-03-01 RX ORDER — LOSARTAN POTASSIUM 100 MG/1
100 TABLET ORAL DAILY
Qty: 30 TABLET | Refills: 0 | Status: SHIPPED | OUTPATIENT
Start: 2024-03-01

## 2024-03-01 RX ORDER — OMEPRAZOLE 20 MG/1
20 CAPSULE, DELAYED RELEASE ORAL DAILY
Qty: 30 CAPSULE | Refills: 0 | Status: SHIPPED | OUTPATIENT
Start: 2024-03-01

## 2024-03-01 RX ORDER — AMLODIPINE BESYLATE 10 MG/1
10 TABLET ORAL DAILY
Qty: 90 TABLET | OUTPATIENT
Start: 2024-03-01

## 2024-03-01 NOTE — TELEPHONE ENCOUNTER
Rx Refill Note  Requested Prescriptions     Pending Prescriptions Disp Refills    omeprazole (priLOSEC) 20 MG capsule [Pharmacy Med Name: OMEPRAZOLE DR 20 MG CAPSULE] 90 capsule      Sig: TAKE 1 CAPSULE BY MOUTH DAILY    amLODIPine (NORVASC) 10 MG tablet [Pharmacy Med Name: amLODIPine BESYLATE 10 MG TAB] 90 tablet      Sig: TAKE 1 TABLET BY MOUTH DAILY    losartan (COZAAR) 100 MG tablet [Pharmacy Med Name: LOSARTAN POTASSIUM 100 MG TAB] 90 tablet      Sig: TAKE 1 TABLET BY MOUTH DAILY      Last office visit with prescribing clinician: 3/10/2023   Last telemedicine visit with prescribing clinician: Visit date not found   Next office visit with prescribing clinician: Visit date not found

## 2024-03-03 DIAGNOSIS — I10 PRIMARY HYPERTENSION: ICD-10-CM

## 2024-03-04 RX ORDER — AMLODIPINE BESYLATE 10 MG/1
10 TABLET ORAL DAILY
Qty: 7 TABLET | OUTPATIENT
Start: 2024-03-04

## 2024-03-04 RX ORDER — LOSARTAN POTASSIUM 100 MG/1
100 TABLET ORAL DAILY
Qty: 7 TABLET | OUTPATIENT
Start: 2024-03-04

## 2024-03-04 RX ORDER — OMEPRAZOLE 20 MG/1
CAPSULE, DELAYED RELEASE ORAL
Qty: 7 CAPSULE | OUTPATIENT
Start: 2024-03-04

## 2024-03-07 DIAGNOSIS — K21.9 CHRONIC GERD: Primary | ICD-10-CM

## 2024-03-07 RX ORDER — OMEPRAZOLE 20 MG/1
20 CAPSULE, DELAYED RELEASE ORAL DAILY
Qty: 15 CAPSULE | Refills: 0 | Status: SHIPPED | OUTPATIENT
Start: 2024-03-07

## 2024-03-28 ENCOUNTER — OFFICE VISIT (OUTPATIENT)
Dept: FAMILY MEDICINE CLINIC | Facility: CLINIC | Age: 69
End: 2024-03-28

## 2024-03-28 VITALS
WEIGHT: 171 LBS | BODY MASS INDEX: 25.91 KG/M2 | TEMPERATURE: 98.2 F | RESPIRATION RATE: 16 BRPM | SYSTOLIC BLOOD PRESSURE: 136 MMHG | HEIGHT: 68 IN | DIASTOLIC BLOOD PRESSURE: 74 MMHG | HEART RATE: 82 BPM | OXYGEN SATURATION: 97 %

## 2024-03-28 DIAGNOSIS — I10 PRIMARY HYPERTENSION: ICD-10-CM

## 2024-03-28 DIAGNOSIS — C50.811 MALIGNANT NEOPLASM OF OVERLAPPING SITES OF RIGHT BREAST IN FEMALE, ESTROGEN RECEPTOR POSITIVE: ICD-10-CM

## 2024-03-28 DIAGNOSIS — Z17.0 MALIGNANT NEOPLASM OF OVERLAPPING SITES OF RIGHT BREAST IN FEMALE, ESTROGEN RECEPTOR POSITIVE: ICD-10-CM

## 2024-03-28 DIAGNOSIS — J43.9 PULMONARY EMPHYSEMA, UNSPECIFIED EMPHYSEMA TYPE: Primary | ICD-10-CM

## 2024-03-28 DIAGNOSIS — K21.9 CHRONIC GERD: ICD-10-CM

## 2024-03-28 DIAGNOSIS — F17.200 TOBACCO USE DISORDER: ICD-10-CM

## 2024-03-28 RX ORDER — LOSARTAN POTASSIUM 100 MG/1
100 TABLET ORAL DAILY
Qty: 90 TABLET | Refills: 1 | Status: SHIPPED | OUTPATIENT
Start: 2024-03-28

## 2024-03-28 RX ORDER — AMLODIPINE BESYLATE 10 MG/1
10 TABLET ORAL DAILY
Qty: 90 TABLET | Refills: 1 | Status: SHIPPED | OUTPATIENT
Start: 2024-03-28

## 2024-03-28 RX ORDER — OMEPRAZOLE 20 MG/1
20 CAPSULE, DELAYED RELEASE ORAL DAILY
Qty: 90 CAPSULE | Refills: 1 | Status: SHIPPED | OUTPATIENT
Start: 2024-03-28

## 2024-03-28 NOTE — PROGRESS NOTES
Subjective   Adele Ly is a 68 y.o. female. Patient is here today for   Chief Complaint   Patient presents with    Med Refill          Vitals:    03/28/24 1118   BP: 136/74   Pulse: 82   Resp: 16   Temp: 98.2 °F (36.8 °C)   SpO2: 97%     Body mass index is 26.01 kg/m².      Past Medical History:   Diagnosis Date    Arthritis     Breast cancer     Right, Stage IA    Colon polyp     Emphysema of lung     Genital herpes     History of diverticulitis     History of gastric ulcer     Hypertension     Infectious viral hepatitis     B      Allergies   Allergen Reactions    Penicillins Swelling     THROAT SWELLS  Other reaction(s): anaphylaxis  THROAT SWELLS  THROAT SWELLS    Latex Rash      Social History     Socioeconomic History    Marital status:     Number of children: 0   Tobacco Use    Smoking status: Every Day     Current packs/day: 1.00     Average packs/day: 1 pack/day for 49.2 years (49.2 ttl pk-yrs)     Types: Cigarettes     Start date: 1975    Smokeless tobacco: Current   Vaping Use    Vaping status: Never Used   Substance and Sexual Activity    Alcohol use: Yes     Comment: COUPLE DAYS A WEEK , BEER    Drug use: No    Sexual activity: Defer     Birth control/protection: Post-menopausal        Current Outpatient Medications:     acyclovir (ZOVIRAX) 400 MG tablet, TAKE 1 TABLET BY MOUTH 5 TIMES DAILY MAX:5 DOSES/DAY, Disp: 30 tablet, Rfl: 8    amLODIPine (NORVASC) 10 MG tablet, Take 1 tablet by mouth Daily., Disp: 90 tablet, Rfl: 1    Apremilast (OTEZLA PO), Take 1 tablet/day by mouth 2 (Two) Times a Day., Disp: , Rfl:     losartan (COZAAR) 100 MG tablet, Take 1 tablet by mouth Daily., Disp: 90 tablet, Rfl: 1    omeprazole (priLOSEC) 20 MG capsule, Take 1 capsule by mouth Daily., Disp: 90 capsule, Rfl: 1    Spiriva Respimat 2.5 MCG/ACT aerosol solution inhaler, INHALE TWO PUFFS BY MOUTH DAILY, Disp: 4 g, Rfl: 11     Objective     History of Present Illness  This patient follows up with me today  after not having been here for about 2 months.  She has hypertension, history of breast cancer, pulmonary emphysema, tobacco use disorder.    She is having difficult time securing insurance.       Review of Systems   Constitutional: Negative.    HENT: Negative.     Respiratory: Negative.     Cardiovascular: Negative.    Musculoskeletal: Negative.    Psychiatric/Behavioral: Negative.         Physical Exam  Vitals and nursing note reviewed.   Constitutional:       Appearance: Normal appearance.      Comments: Pleasant, neatly groomed, no distress.   Cardiovascular:      Rate and Rhythm: Regular rhythm.      Heart sounds: Normal heart sounds. No murmur heard.     No gallop.   Pulmonary:      Effort: No respiratory distress.      Breath sounds: Wheezing present. No rales.   Neurological:      Mental Status: She is alert and oriented to person, place, and time.   Psychiatric:         Mood and Affect: Mood normal.         Behavior: Behavior normal.         Thought Content: Thought content normal.           Problems Addressed this Visit          Cardiac and Vasculature    Hypertension    Relevant Medications    amLODIPine (NORVASC) 10 MG tablet    losartan (COZAAR) 100 MG tablet       Hematology and Neoplasia    Malignant neoplasm of overlapping sites of right breast in female, estrogen receptor positive       Pulmonary and Pneumonias    Pulmonary emphysema - Primary       Tobacco    Tobacco use disorder     Other Visit Diagnoses       Chronic GERD        Relevant Medications    omeprazole (priLOSEC) 20 MG capsule          Diagnoses         Codes Comments    Pulmonary emphysema, unspecified emphysema type    -  Primary ICD-10-CM: J43.9  ICD-9-CM: 492.8     Primary hypertension     ICD-10-CM: I10  ICD-9-CM: 401.9     Chronic GERD     ICD-10-CM: K21.9  ICD-9-CM: 530.81     Tobacco use disorder     ICD-10-CM: F17.200  ICD-9-CM: 305.1     Malignant neoplasm of overlapping sites of right breast in female, estrogen receptor  positive     ICD-10-CM: C50.811, Z17.0  ICD-9-CM: 174.8, V86.0               PLAN  She has pulmonary emphysema.  She continues to smoke cigarettes.  I discussed smoking cessation with her today.    She requested samples of inhaler.  I think it would be good for her to take Trelegy 101 elation daily.  I gave her 4-week sample.    Her hypertension is well-controlled.    She has a history of malignant neoplasm of the right breast estrogen receptor positive.  She follows up with gynecology and oncology as directed by them.    I asked her to follow-up with me in 3 months.  Fasting labs prior to that visit should include: Lipid profile, comprehensive metabolic panel, CBC, urinalysis.  Okay will  No follow-ups on file.

## 2024-07-05 ENCOUNTER — TELEPHONE (OUTPATIENT)
Dept: FAMILY MEDICINE CLINIC | Facility: CLINIC | Age: 69
End: 2024-07-05

## 2024-07-05 NOTE — TELEPHONE ENCOUNTER
Hub staff attempted to follow warm transfer process and was unsuccessful     Caller: Adele Ly    Relationship to patient: Self    Best call back number: [phone]     Patient is needing: TO RESCHEDULE LAB APPOINTMENT FOR 7/22/24

## 2024-07-08 RX ORDER — ACYCLOVIR 400 MG/1
TABLET ORAL
Qty: 30 TABLET | Refills: 8 | OUTPATIENT
Start: 2024-07-08

## 2024-07-08 NOTE — TELEPHONE ENCOUNTER
HUB TO RELAY    SPOKE WITH PT. PT IS GOING TO CALL BACK TO SET UP LABS WHEN SHE GETS HER MEDICAID CARD

## 2024-07-11 NOTE — TELEPHONE ENCOUNTER
Provider: NIC Johnson    Caller: ELIZABETH MARQUEZ    Relationship to Patient: SELF    Pharmacy:   [facility] [id number] - [facility] [address] AT [facility] - [phone] [facility] [phone]  [phone]       Phone Number: [phone]    Reason for Call: MEDICATION REFILL    When was the patient last seen: 02.22.22    PATIENT CALLING IN BECAUSE SHE ONLY HAS 4 OF THE acyclovir (ZOVIRAX) 400 MG tablet.  Sig: TAKE 1 TABLET BY MOUTH 5 TIMES DAILY MAX:5 DOSES/DAY     PATIENT IS STATING THAT SHE ONLY HAS 4 PILLS LEFT.     PATIENT IS STATING THAT SHE NEEDS A REFILL AND THAT SHE CAN'T AFFORD TO COME IN BECAUSE SHE DOES HAVE INSURANCE AT THIS TIME.     PATIENT IS ALSO WANTING TO KNOW IF THE MG IS INCREASED CAN SHE TAKE IT LESS TIMES A DAY, IF THIS WILL BE CHEAPER    PATIENT CAN BE REACHED .[phone]    THANK YOU

## 2024-07-12 RX ORDER — ACYCLOVIR 400 MG/1
TABLET ORAL
Qty: 30 TABLET | Refills: 8 | Status: SHIPPED | OUTPATIENT
Start: 2024-07-12

## 2024-10-05 DIAGNOSIS — I10 PRIMARY HYPERTENSION: ICD-10-CM

## 2024-10-07 RX ORDER — AMLODIPINE BESYLATE 10 MG/1
10 TABLET ORAL DAILY
Qty: 90 TABLET | Refills: 1 | Status: SHIPPED | OUTPATIENT
Start: 2024-10-07

## 2024-10-07 RX ORDER — LOSARTAN POTASSIUM 100 MG/1
100 TABLET ORAL DAILY
Qty: 90 TABLET | Refills: 1 | Status: SHIPPED | OUTPATIENT
Start: 2024-10-07

## 2024-10-21 DIAGNOSIS — K21.9 CHRONIC GERD: ICD-10-CM

## 2024-11-19 ENCOUNTER — OFFICE VISIT (OUTPATIENT)
Dept: FAMILY MEDICINE CLINIC | Facility: CLINIC | Age: 69
End: 2024-11-19
Payer: MEDICARE

## 2024-11-19 VITALS
OXYGEN SATURATION: 95 % | RESPIRATION RATE: 16 BRPM | HEIGHT: 68 IN | BODY MASS INDEX: 26.1 KG/M2 | HEART RATE: 80 BPM | DIASTOLIC BLOOD PRESSURE: 80 MMHG | TEMPERATURE: 96 F | WEIGHT: 172.2 LBS | SYSTOLIC BLOOD PRESSURE: 138 MMHG

## 2024-11-19 DIAGNOSIS — I10 PRIMARY HYPERTENSION: Primary | ICD-10-CM

## 2024-11-19 DIAGNOSIS — E78.00 HYPERCHOLESTEROLEMIA: ICD-10-CM

## 2024-11-19 DIAGNOSIS — J43.9 PULMONARY EMPHYSEMA, UNSPECIFIED EMPHYSEMA TYPE: ICD-10-CM

## 2024-11-19 DIAGNOSIS — K21.9 CHRONIC GERD: ICD-10-CM

## 2024-11-19 DIAGNOSIS — F17.200 TOBACCO USE DISORDER: ICD-10-CM

## 2024-11-19 PROCEDURE — 3079F DIAST BP 80-89 MM HG: CPT | Performed by: INTERNAL MEDICINE

## 2024-11-19 PROCEDURE — 99214 OFFICE O/P EST MOD 30 MIN: CPT | Performed by: INTERNAL MEDICINE

## 2024-11-19 PROCEDURE — 1125F AMNT PAIN NOTED PAIN PRSNT: CPT | Performed by: INTERNAL MEDICINE

## 2024-11-19 PROCEDURE — 3075F SYST BP GE 130 - 139MM HG: CPT | Performed by: INTERNAL MEDICINE

## 2024-11-19 RX ORDER — LOSARTAN POTASSIUM 100 MG/1
100 TABLET ORAL DAILY
Qty: 90 TABLET | Refills: 1 | Status: SHIPPED | OUTPATIENT
Start: 2024-11-19

## 2024-11-19 RX ORDER — TIOTROPIUM BROMIDE INHALATION SPRAY 3.12 UG/1
2 SPRAY, METERED RESPIRATORY (INHALATION) DAILY
Qty: 4 G | Refills: 11 | Status: SHIPPED | OUTPATIENT
Start: 2024-11-19

## 2024-11-19 RX ORDER — MELOXICAM 15 MG/1
15 TABLET ORAL DAILY
Qty: 30 TABLET | Refills: 1 | Status: SHIPPED | OUTPATIENT
Start: 2024-11-19

## 2024-11-19 NOTE — PROGRESS NOTES
Subjective   Adele Ly is a 68 y.o. female. Patient is here today for   Chief Complaint   Patient presents with    Hypertension        History of Present Illness  She is here today to follow-up on hypertension, hypercholesterolemia, and chronic struct of pulmonary disease.    She tells me that she is feeling well overall.        History of Present Illness  The patient presents for evaluation of multiple medical concerns.    She sustained an injury to her left hand earlier this year, resulting in persistent swelling and pain. Due to a lack of insurance, she did not seek immediate medical attention and has been managing the pain with ibuprofen. Additionally, she reports arthritis in her right hand.    She is currently on Spiriva for her respiratory condition and has a nebulizer at home. She continues to smoke and experiences morning allergies, for which she uses Flonase and an inhaler.    She recently recovered from a severe bout of influenza.    Her daily medications include amlodipine, losartan, and omeprazole. She is also on Otezla for psoriasis affecting her hands and feet, under the care of Dr. Montez Mitchell.    She is due for a mammogram, which will be arranged through her gynecologist.      Vitals:    11/19/24 0803   BP: 138/80   Pulse: 80   Resp: 16   Temp: 96 °F (35.6 °C)   SpO2: 95%     Body mass index is 26.19 kg/m².    Past Medical History:   Diagnosis Date    Arthritis     Breast cancer     Right, Stage IA    Colon polyp     Emphysema of lung     Genital herpes     History of diverticulitis     History of gastric ulcer     Hypertension     Infectious viral hepatitis     B      Allergies   Allergen Reactions    Penicillins Swelling     THROAT SWELLS  Other reaction(s): anaphylaxis  THROAT SWELLS  THROAT SWELLS    Latex Rash      Social History     Socioeconomic History    Marital status:     Number of children: 0   Tobacco Use    Smoking status: Every Day     Current packs/day: 1.00     Average  packs/day: 1 pack/day for 49.9 years (49.9 ttl pk-yrs)     Types: Cigarettes     Start date: 1975    Smokeless tobacco: Current   Vaping Use    Vaping status: Never Used   Substance and Sexual Activity    Alcohol use: Yes     Comment: COUPLE DAYS A WEEK , BEER    Drug use: No    Sexual activity: Defer     Birth control/protection: Post-menopausal        Current Outpatient Medications:     acyclovir (ZOVIRAX) 400 MG tablet, TAKE 1 TABLET BY MOUTH 5 TIMES A DAY, Disp: 30 tablet, Rfl: 8    amLODIPine (NORVASC) 10 MG tablet, TAKE 1 TABLET BY MOUTH DAILY, Disp: 90 tablet, Rfl: 1    Apremilast (OTEZLA PO), Take 1 tablet/day by mouth 2 (Two) Times a Day., Disp: , Rfl:     losartan (COZAAR) 100 MG tablet, Take 1 tablet by mouth Daily., Disp: 90 tablet, Rfl: 1    omeprazole (priLOSEC) 20 MG capsule, Take 1 capsule by mouth Daily., Disp: 90 capsule, Rfl: 1    Spiriva Respimat 2.5 MCG/ACT aerosol solution inhaler, Inhale 2 puffs Daily., Disp: 4 g, Rfl: 11    meloxicam (MOBIC) 15 MG tablet, Take 1 tablet by mouth Daily., Disp: 30 tablet, Rfl: 1     Objective     Review of Systems   Constitutional:  Positive for fatigue.   HENT: Negative.     Respiratory: Negative.     Cardiovascular: Negative.    Musculoskeletal: Negative.    Psychiatric/Behavioral: Negative.         Physical Exam  Vitals and nursing note reviewed.   Constitutional:       General: She is not in acute distress.     Appearance: Normal appearance. She is not ill-appearing, toxic-appearing or diaphoretic.      Comments: Pleasant, neatly groomed, no distress.  BMI 26.   Neck:      Vascular: No carotid bruit.   Cardiovascular:      Rate and Rhythm: Regular rhythm.      Heart sounds: Normal heart sounds. No murmur heard.     No gallop.   Pulmonary:      Effort: No respiratory distress.      Breath sounds: Normal breath sounds. No wheezing or rales.   Musculoskeletal:      Comments: She has enlargement of proximal interphalangeal joints and distal interphalangeal  joints of both hands.   Neurological:      Mental Status: She is alert and oriented to person, place, and time.   Psychiatric:         Mood and Affect: Mood normal.         Behavior: Behavior normal.         Thought Content: Thought content normal.       Physical Exam  Clear lung sounds.  Normal heart sounds. No murmurs.    Results  Laboratory Studies  Cholesterol was a little high in March 2023.    Assessment & Plan    Problems Addressed this Visit          Cardiac and Vasculature    Hypertension - Primary    Relevant Medications    losartan (COZAAR) 100 MG tablet    Other Relevant Orders    Comprehensive Metabolic Panel    Hypercholesterolemia    Relevant Orders    Lipid Panel With LDL / HDL Ratio    TSH Rfx On Abnormal To Free T4       Pulmonary and Pneumonias    Pulmonary emphysema    Relevant Medications    Spiriva Respimat 2.5 MCG/ACT aerosol solution inhaler       Tobacco    Tobacco use disorder    Relevant Orders    CBC No Differential     Other Visit Diagnoses       Chronic GERD        Relevant Medications    omeprazole (priLOSEC) 20 MG capsule          Diagnoses         Codes Comments    Primary hypertension    -  Primary ICD-10-CM: I10  ICD-9-CM: 401.9     Pulmonary emphysema, unspecified emphysema type     ICD-10-CM: J43.9  ICD-9-CM: 492.8     Tobacco use disorder     ICD-10-CM: F17.200  ICD-9-CM: 305.1     Hypercholesterolemia     ICD-10-CM: E78.00  ICD-9-CM: 272.0     Chronic GERD     ICD-10-CM: K21.9  ICD-9-CM: 530.81           Assessment & Plan  1. Osteoarthritis.  She reports persistent swelling and pain in her left hand, which has been ongoing since the beginning of the year. She has been taking ibuprofen for pain relief. An x-ray was discussed but deemed unlikely to change the treatment plan. A prescription for meloxicam, one tablet daily as needed, has been provided. She is advised to take meloxicam with food to avoid gastrointestinal upset. Kidney function will be assessed to ensure the safety  of this medication.    2. Allergies.  She reports experiencing allergies with symptoms of a runny nose. She is advised to take an antihistamine, which she has found effective in the past. She also uses Flonase and an inhaler as needed.    3. Psoriasis.  She is currently taking Otezla for psoriasis on her hands and feet. She has been seeing a dermatologist, Vanessa, for management and refills.      4. Hypertension.  She is taking amlodipine and losartan daily for blood pressure management. When I rechecked her blood pressure today she was seated I got 128/78 in her right arm she was seated.    5. Gastroesophageal Reflux Disease (GERD).  She is taking omeprazole daily for GERD.    6. Chronic Obstructive Pulmonary Disease (COPD).  She uses a Spiriva inhaler and a nebulizer solution as needed. A prescription for Spiriva has been sent.    7. Hypercholesterolemia.  Her last lab results from March 2023 indicated slightly elevated cholesterol levels. Lab work has been ordered to reassess her cholesterol and other parameters.    I asked her to follow-up for a Medicare annual wellness visit at her convenience.    She is fasting today.  She does have a history of hypercholesterolemia.She complains of fatigue.  I will check a CBC without differential, comprehensive metabolic panel, lipid profile, TSH with reflex free T4,    I would like her to follow-up for a Medicare annual wellness visit at her convenience.    No follow-ups on file.    Patient or patient representative verbalized consent for the use of Ambient Listening during the visit with  Hunter Cosby MD for chart documentation. 11/19/2024  12:20 EST

## 2025-01-30 ENCOUNTER — TRANSCRIBE ORDERS (OUTPATIENT)
Dept: ADMINISTRATIVE | Facility: HOSPITAL | Age: 70
End: 2025-01-30
Payer: MEDICARE

## 2025-01-30 DIAGNOSIS — R07.9 CHEST PAIN, UNSPECIFIED TYPE: Primary | ICD-10-CM

## 2025-02-03 ENCOUNTER — TRANSCRIBE ORDERS (OUTPATIENT)
Dept: ADMINISTRATIVE | Facility: HOSPITAL | Age: 70
End: 2025-02-03
Payer: MEDICARE

## 2025-02-03 DIAGNOSIS — F17.210 CIGARETTE SMOKER: Primary | ICD-10-CM

## 2025-02-14 ENCOUNTER — APPOINTMENT (OUTPATIENT)
Dept: GENERAL RADIOLOGY | Facility: HOSPITAL | Age: 70
End: 2025-02-14
Payer: MEDICARE

## 2025-02-14 ENCOUNTER — HOSPITAL ENCOUNTER (EMERGENCY)
Facility: HOSPITAL | Age: 70
Discharge: HOME | End: 2025-02-14
Attending: EMERGENCY MEDICINE
Payer: MEDICARE

## 2025-02-14 VITALS
DIASTOLIC BLOOD PRESSURE: 92 MMHG | BODY MASS INDEX: 25.01 KG/M2 | HEIGHT: 68 IN | OXYGEN SATURATION: 95 % | WEIGHT: 165 LBS | RESPIRATION RATE: 18 BRPM | SYSTOLIC BLOOD PRESSURE: 139 MMHG | TEMPERATURE: 97.5 F | HEART RATE: 80 BPM

## 2025-02-14 DIAGNOSIS — R06.00 DYSPNEA, UNSPECIFIED TYPE: Primary | ICD-10-CM

## 2025-02-14 DIAGNOSIS — F17.210 TOBACCO DEPENDENCE DUE TO CIGARETTES: ICD-10-CM

## 2025-02-14 LAB
ALBUMIN SERPL-MCNC: 4.1 G/DL (ref 3.5–5.2)
ALBUMIN/GLOB SERPL: 1.4 G/DL
ALP SERPL-CCNC: 78 U/L (ref 39–117)
ALT SERPL W P-5'-P-CCNC: 6 U/L (ref 1–33)
ANION GAP SERPL CALCULATED.3IONS-SCNC: 11 MMOL/L (ref 5–15)
AST SERPL-CCNC: 15 U/L (ref 1–32)
BASOPHILS # BLD AUTO: 0.04 10*3/MM3 (ref 0–0.2)
BASOPHILS NFR BLD AUTO: 0.5 % (ref 0–1.5)
BILIRUB SERPL-MCNC: 0.3 MG/DL (ref 0–1.2)
BUN SERPL-MCNC: 9 MG/DL (ref 8–23)
BUN/CREAT SERPL: 11.5 (ref 7–25)
CALCIUM SPEC-SCNC: 9.5 MG/DL (ref 8.6–10.5)
CHLORIDE SERPL-SCNC: 106 MMOL/L (ref 98–107)
CO2 SERPL-SCNC: 24 MMOL/L (ref 22–29)
CREAT SERPL-MCNC: 0.78 MG/DL (ref 0.57–1)
DEPRECATED RDW RBC AUTO: 42.3 FL (ref 37–54)
EGFRCR SERPLBLD CKD-EPI 2021: 83.4 ML/MIN/1.73
EOSINOPHIL # BLD AUTO: 0.18 10*3/MM3 (ref 0–0.4)
EOSINOPHIL NFR BLD AUTO: 2.1 % (ref 0.3–6.2)
ERYTHROCYTE [DISTWIDTH] IN BLOOD BY AUTOMATED COUNT: 12.9 % (ref 12.3–15.4)
GEN 5 1HR TROPONIN T REFLEX: 7 NG/L
GLOBULIN UR ELPH-MCNC: 3 GM/DL
GLUCOSE SERPL-MCNC: 92 MG/DL (ref 65–99)
HCT VFR BLD AUTO: 43.9 % (ref 34–46.6)
HGB BLD-MCNC: 14.9 G/DL (ref 12–15.9)
HOLD SPECIMEN: NORMAL
HOLD SPECIMEN: NORMAL
IMM GRANULOCYTES # BLD AUTO: 0.03 10*3/MM3 (ref 0–0.05)
IMM GRANULOCYTES NFR BLD AUTO: 0.3 % (ref 0–0.5)
LYMPHOCYTES # BLD AUTO: 2.78 10*3/MM3 (ref 0.7–3.1)
LYMPHOCYTES NFR BLD AUTO: 32 % (ref 19.6–45.3)
MCH RBC QN AUTO: 30.7 PG (ref 26.6–33)
MCHC RBC AUTO-ENTMCNC: 33.9 G/DL (ref 31.5–35.7)
MCV RBC AUTO: 90.3 FL (ref 79–97)
MONOCYTES # BLD AUTO: 0.45 10*3/MM3 (ref 0.1–0.9)
MONOCYTES NFR BLD AUTO: 5.2 % (ref 5–12)
NEUTROPHILS NFR BLD AUTO: 5.2 10*3/MM3 (ref 1.7–7)
NEUTROPHILS NFR BLD AUTO: 59.9 % (ref 42.7–76)
NRBC BLD AUTO-RTO: 0 /100 WBC (ref 0–0.2)
NT-PROBNP SERPL-MCNC: 70.3 PG/ML (ref 0–900)
PLATELET # BLD AUTO: 232 10*3/MM3 (ref 140–450)
PMV BLD AUTO: 8.5 FL (ref 6–12)
POTASSIUM SERPL-SCNC: 3.7 MMOL/L (ref 3.5–5.2)
PROT SERPL-MCNC: 7.1 G/DL (ref 6–8.5)
QT INTERVAL: 346 MS
QTC INTERVAL: 434 MS
RBC # BLD AUTO: 4.86 10*6/MM3 (ref 3.77–5.28)
SODIUM SERPL-SCNC: 141 MMOL/L (ref 136–145)
TROPONIN T NUMERIC DELTA: NORMAL
TROPONIN T SERPL HS-MCNC: <6 NG/L
WBC NRBC COR # BLD AUTO: 8.68 10*3/MM3 (ref 3.4–10.8)
WHOLE BLOOD HOLD COAG: NORMAL
WHOLE BLOOD HOLD SPECIMEN: NORMAL

## 2025-02-14 PROCEDURE — 36415 COLL VENOUS BLD VENIPUNCTURE: CPT

## 2025-02-14 PROCEDURE — 93010 ELECTROCARDIOGRAM REPORT: CPT | Performed by: INTERNAL MEDICINE

## 2025-02-14 PROCEDURE — 94799 UNLISTED PULMONARY SVC/PX: CPT

## 2025-02-14 PROCEDURE — 84484 ASSAY OF TROPONIN QUANT: CPT

## 2025-02-14 PROCEDURE — 99284 EMERGENCY DEPT VISIT MOD MDM: CPT

## 2025-02-14 PROCEDURE — 71045 X-RAY EXAM CHEST 1 VIEW: CPT

## 2025-02-14 PROCEDURE — 93005 ELECTROCARDIOGRAM TRACING: CPT | Performed by: EMERGENCY MEDICINE

## 2025-02-14 PROCEDURE — 94640 AIRWAY INHALATION TREATMENT: CPT

## 2025-02-14 PROCEDURE — 85025 COMPLETE CBC W/AUTO DIFF WBC: CPT

## 2025-02-14 PROCEDURE — 93005 ELECTROCARDIOGRAM TRACING: CPT

## 2025-02-14 PROCEDURE — 94761 N-INVAS EAR/PLS OXIMETRY MLT: CPT

## 2025-02-14 PROCEDURE — 83880 ASSAY OF NATRIURETIC PEPTIDE: CPT

## 2025-02-14 PROCEDURE — 80053 COMPREHEN METABOLIC PANEL: CPT

## 2025-02-14 RX ORDER — PREDNISONE 50 MG/1
50 TABLET ORAL DAILY
Qty: 5 TABLET | Refills: 0 | Status: SHIPPED | OUTPATIENT
Start: 2025-02-14 | End: 2025-02-19

## 2025-02-14 RX ORDER — SODIUM CHLORIDE 0.9 % (FLUSH) 0.9 %
10 SYRINGE (ML) INJECTION AS NEEDED
Status: DISCONTINUED | OUTPATIENT
Start: 2025-02-14 | End: 2025-02-14 | Stop reason: HOSPADM

## 2025-02-14 RX ORDER — IPRATROPIUM BROMIDE AND ALBUTEROL SULFATE 2.5; .5 MG/3ML; MG/3ML
3 SOLUTION RESPIRATORY (INHALATION) ONCE
Status: COMPLETED | OUTPATIENT
Start: 2025-02-14 | End: 2025-02-14

## 2025-02-14 RX ORDER — FLUTICASONE PROPIONATE 50 MCG
2 SPRAY, SUSPENSION (ML) NASAL DAILY
Qty: 16 G | Refills: 0 | Status: SHIPPED | OUTPATIENT
Start: 2025-02-14

## 2025-02-14 RX ORDER — CETIRIZINE HYDROCHLORIDE 10 MG/1
10 TABLET ORAL DAILY
Qty: 30 TABLET | Refills: 0 | Status: SHIPPED | OUTPATIENT
Start: 2025-02-14

## 2025-02-14 RX ADMIN — IPRATROPIUM BROMIDE AND ALBUTEROL SULFATE 3 ML: .5; 3 SOLUTION RESPIRATORY (INHALATION) at 16:49

## 2025-02-14 NOTE — ED PROVIDER NOTES
MD ATTESTATION NOTE  I supervised care provided by the midlevel provider. We have discussed this patient's history, physical exam, and treatment plan. I have reviewed the midlevel provider's note and I agree with the midlevel provider's findings and plan of care.   SHARED VISIT: This visit was performed by BOTH a physician and an APC. The substantive portion of the medical decision making was performed by this attesting physician who made or approved the management plan and takes responsibility for patient management. All studies in the APC note (if performed) were independently interpreted by me.   I have personally had a face to face encounter with the patient.     PCP: Hunter Cosby MD  Patient Care Team:  Hunter Cosby MD as PCP - General (Internal Medicine)  Arianne Taylor MD as PCP - Ob/Gyn (Obstetrics and Gynecology)  Juan Peterson MD as Referring Physician (Breast Surgery)  Pooja Moncada MD as Consulting Physician (Hematology and Oncology)  Eloisa Valdes PA as Physician Assistant (Obstetrics and Gynecology)     Adele Ly is a 69 y.o. female who presents to the ED c/o shortness of breath.  Patient reports that she has been feeling short of breath for last several months.  Patient reports that she has been seen by urgent care several times and diagnosed with COPD exacerbation.  Patient reports that she was recently seen by pulmonologist and told her that she is close to COPD but she does not have actual COPD.  Patient denies any chest pain, no fevers.  Patient is currently scheduled to have an outpatient cardiac stress test.    On exam:  General: NAD.  Head: NCAT.  ENT: nares patent, no scleral icterus  Neck: Supple, trachea midline.  Cardiac: regular rate and rhythm.  Lungs: normal effort, clear to auscultation bilaterally  Abdomen: Soft, nondistended, NTTP, no rebound tenderness, no guarding or rigidity.   Extremities: Moves all extremities well, no peripheral edema  Neuro:  alert, MAEW, follows commands  Psych: calm, cooperative  Skin: Warm, dry.    Medical Decision Making:  After the initial H&P, I discussed pertinent information from history and physical exam with patient/family.  Discussed differential diagnosis.  Discussed plan for ED evaluation/work-up/treatment.  All questions answered.  Patient/family is agreeable with plan.    ED Course as of 02/14/25 1918 Fri Feb 14, 2025   1640 WBC: 8.68 [DC]   1640 Hemoglobin: 14.9 [DC]   1640 proBNP: 70.3 [DC]   1640 Glucose: 92 [DC]   1640 Creatinine: 0.78 [DC]   1640 Sodium: 141 [DC]   1640 Potassium: 3.7 [DC]   1640 HS Troponin T: <6 [DC]   1640 HS Troponin T: 7 [DC]   1641 XR Chest 1 View  Radiology study independently interpreted by me and my findings are no dense consolidation.   [DC]   1740 Discussed lab and imaging results with the patient.  We will obtain a walking pulse ox and if normal will plan for discharge.  She feels breathing treatment may have helped slightly.  She does have outpatient testing scheduled for stress echo and CT chest which are appropriate but patient does not require emergent admission to the hospital at this time.  She will begin doing breathing treatments more consistently at home and will restart taking her daily antihistamine and nasal spray as well.  She was also previously prescribed Chantix and recommended to decrease and discontinue smoking as this is likely contributing to her shortness of breath.  Discussed with patient strict ER return precautions should any of her symptoms worsen or she develop new symptoms. [DC]   1801 Pt ambulated without O2 desaturation. [DC]      ED Course User Index  [DC] Angy Machuca PA       Diagnosis  Final diagnoses:   Dyspnea, unspecified type   Tobacco dependence due to cigarettes          Bautista Mccarthy MD  02/14/25 1801       Bautista Mccarthy MD  02/14/25 1918

## 2025-02-14 NOTE — ED PROVIDER NOTES
EMERGENCY DEPARTMENT ENCOUNTER      PCP: Hunter Cosby MD  Patient Care Team:  Hunter Cosby MD as PCP - General (Internal Medicine)  Arianne Taylor MD as PCP - Ob/Gyn (Obstetrics and Gynecology)  Juan Peterson MD as Referring Physician (Breast Surgery)  Pooja Moncada MD as Consulting Physician (Hematology and Oncology)  Eloisa Valdes PA as Physician Assistant (Obstetrics and Gynecology)   Independent Historians: Patient    HPI:  Chief Complaint: SOA   A complete HPI/ROS/PMH/PSH/SH/FH are unobtainable due to: None    Chronic or social conditions impacting patient care (social determinants of health): None    Context: Adele Ly is a 69 y.o. female who presents to the ED c/o acute SOA worsening over the past 24-48 hours. Pt states she has had SOA for months but recently had a cold about a week ago that seem to exacerbate her symptoms.  No reported chest pain, fever.  She does have a nebulizer at home which has not been using it consistently.  No known coronary artery disease or history of MI.  Patient has been told she had COPD due to smoking history however after recent evaluation by pulmonologist she was told she did not have COPD.  She does continue to smoke cigarettes.  Denies hemoptysis, leg swelling.    Review of prior external notes and/or external test results outside of this encounter: Reviewed encounter with nurse practitioner Jossy Benitez on 1/21/2025 for complaints of shortness of breath, cough since November.  Was given prescription for cefdinir, prednisone.  Chest x-ray on 1/21/2025 showed no focal consolidation, pleural effusion or pneumothorax.      PAST MEDICAL HISTORY  Active Ambulatory Problems     Diagnosis Date Noted    Hypertension 03/13/2017    Pulmonary emphysema 03/13/2017    Malignant neoplasm of overlapping sites of right breast in female, estrogen receptor positive 12/18/2017    Tobacco use disorder 03/15/2018    Migraine without status migrainosus, not  intractable 03/15/2018    Hypercholesterolemia 11/19/2024     Resolved Ambulatory Problems     Diagnosis Date Noted    No Resolved Ambulatory Problems     Past Medical History:   Diagnosis Date    Arthritis     Breast cancer     Colon polyp     Emphysema of lung     Genital herpes     History of diverticulitis     History of gastric ulcer     Infectious viral hepatitis        The patient has started, but not completed, their COVID-19 vaccination series.    PAST SURGICAL HISTORY  Past Surgical History:   Procedure Laterality Date    BREAST BIOPSY Right 2017    malignant    COLON RESECTION      COLOSTOMY      COLOSTOMY REVISION      MASTECTOMY WITH SENTINEL NODE BIOPSY AND AXILLARY NODE DISSECTION Right 1/10/2018    Procedure: BREAST MASTECTOMY WITH SENTINEL NODE BIOPSY;  Surgeon: Juan Peterson MD;  Location: [facility];  Service:     PELVIC LAPAROSCOPY      TONSILLECTOMY      WISDOM TOOTH EXTRACTION           FAMILY HISTORY  Family History   Problem Relation Age of Onset    Diabetes Father     Hypertension Father     No Known Problems Mother     No Known Problems Sister     No Known Problems Brother     No Known Problems Daughter     No Known Problems Son     Breast cancer Maternal Grandmother 60    No Known Problems Paternal Grandmother     Breast cancer Maternal Aunt 60    Diabetes Paternal Aunt     Ovarian cancer Paternal Aunt 41    Colon cancer Maternal Uncle     COPD Paternal Uncle     BRCA 1/2 Neg Hx     Endometrial cancer Neg Hx     Malig Hyperthermia Neg Hx          SOCIAL HISTORY  Social History     Socioeconomic History    Marital status:     Number of children: 0   Tobacco Use    Smoking status: Every Day     Current packs/day: 1.00     Average packs/day: 1 pack/day for 50.1 years (50.1 ttl pk-yrs)     Types: Cigarettes     Start date: 1975    Smokeless tobacco: Current   Vaping Use    Vaping status: Never Used   Substance and Sexual Activity    Alcohol use: Yes     Comment: COUPLE DAYS  A WEEK , BEER    Drug use: No    Sexual activity: Defer     Birth control/protection: Post-menopausal         ALLERGIES  Penicillins and Latex        REVIEW OF SYSTEMS  Review of Systems   Constitutional:  Negative for chills and fever.   Respiratory:  Positive for cough, chest tightness and shortness of breath.    Cardiovascular:  Negative for chest pain and leg swelling.   Gastrointestinal:  Negative for abdominal pain.        All systems reviewed and negative except for those discussed in HPI.       PHYSICAL EXAM    I have reviewed the triage vital signs and nursing notes.    ED Triage Vitals   Temp Heart Rate Resp BP SpO2   02/14/25 1401 02/14/25 1401 02/14/25 1401 02/14/25 1406 02/14/25 1401   97.5 °F (36.4 °C) 108 18 165/91 98 %      Temp src Heart Rate Source Patient Position BP Location FiO2 (%)   02/14/25 1401 02/14/25 1401 02/14/25 1406 02/14/25 1406 --   Tympanic Monitor Sitting Left arm        Physical Exam  GENERAL: alert, no acute distress  SKIN: Warm, dry  HENT: Normocephalic, atraumatic  EYES: no scleral icterus  CV: regular rhythm, regular rate  RESPIRATORY: normal effort, harsh cough,  ABDOMEN: soft, nontender, nondistended  MUSCULOSKELETAL: no deformity  NEURO: alert, moves all extremities, follows commands          LAB RESULTS  Recent Results (from the past 24 hours)   ECG 12 Lead ED Triage Standing Order; SOA    Collection Time: 02/14/25  2:06 PM   Result Value Ref Range    QT Interval 346 ms    QTC Interval 434 ms   Comprehensive Metabolic Panel    Collection Time: 02/14/25  2:17 PM    Specimen: Arm, Left; Blood   Result Value Ref Range    Glucose 92 65 - 99 mg/dL    BUN 9 8 - 23 mg/dL    Creatinine 0.78 0.57 - 1.00 mg/dL    Sodium 141 136 - 145 mmol/L    Potassium 3.7 3.5 - 5.2 mmol/L    Chloride 106 98 - 107 mmol/L    CO2 24.0 22.0 - 29.0 mmol/L    Calcium 9.5 8.6 - 10.5 mg/dL    Total Protein 7.1 6.0 - 8.5 g/dL    Albumin 4.1 3.5 - 5.2 g/dL    ALT (SGPT) 6 1 - 33 U/L    AST (SGOT) 15 1 - 32  U/L    Alkaline Phosphatase 78 39 - 117 U/L    Total Bilirubin 0.3 0.0 - 1.2 mg/dL    Globulin 3.0 gm/dL    A/G Ratio 1.4 g/dL    BUN/Creatinine Ratio 11.5 7.0 - 25.0    Anion Gap 11.0 5.0 - 15.0 mmol/L    eGFR 83.4 >60.0 mL/min/1.73   BNP    Collection Time: 02/14/25  2:17 PM    Specimen: Arm, Left; Blood   Result Value Ref Range    proBNP 70.3 0.0 - 900.0 pg/mL   High Sensitivity Troponin T    Collection Time: 02/14/25  2:17 PM    Specimen: Arm, Left; Blood   Result Value Ref Range    HS Troponin T <6 <14 ng/L   Green Top (Gel)    Collection Time: 02/14/25  2:17 PM   Result Value Ref Range    Extra Tube Hold for add-ons.    Lavender Top    Collection Time: 02/14/25  2:17 PM   Result Value Ref Range    Extra Tube hold for add-on    Gold Top - SST    Collection Time: 02/14/25  2:17 PM   Result Value Ref Range    Extra Tube Hold for add-ons.    Light Blue Top    Collection Time: 02/14/25  2:17 PM   Result Value Ref Range    Extra Tube Hold for add-ons.    CBC Auto Differential    Collection Time: 02/14/25  2:17 PM    Specimen: Arm, Left; Blood   Result Value Ref Range    WBC 8.68 3.40 - 10.80 10*3/mm3    RBC 4.86 3.77 - 5.28 10*6/mm3    Hemoglobin 14.9 12.0 - 15.9 g/dL    Hematocrit 43.9 34.0 - 46.6 %    MCV 90.3 79.0 - 97.0 fL    MCH 30.7 26.6 - 33.0 pg    MCHC 33.9 31.5 - 35.7 g/dL    RDW 12.9 12.3 - 15.4 %    RDW-SD 42.3 37.0 - 54.0 fl    MPV 8.5 6.0 - 12.0 fL    Platelets 232 140 - 450 10*3/mm3    Neutrophil % 59.9 42.7 - 76.0 %    Lymphocyte % 32.0 19.6 - 45.3 %    Monocyte % 5.2 5.0 - 12.0 %    Eosinophil % 2.1 0.3 - 6.2 %    Basophil % 0.5 0.0 - 1.5 %    Immature Grans % 0.3 0.0 - 0.5 %    Neutrophils, Absolute 5.20 1.70 - 7.00 10*3/mm3    Lymphocytes, Absolute 2.78 0.70 - 3.10 10*3/mm3    Monocytes, Absolute 0.45 0.10 - 0.90 10*3/mm3    Eosinophils, Absolute 0.18 0.00 - 0.40 10*3/mm3    Basophils, Absolute 0.04 0.00 - 0.20 10*3/mm3    Immature Grans, Absolute 0.03 0.00 - 0.05 10*3/mm3    nRBC 0.0 0.0 - 0.2  /100 WBC   High Sensitivity Troponin T 1Hr    Collection Time: 02/14/25  3:36 PM    Specimen: Arm, Left; Blood   Result Value Ref Range    HS Troponin T 7 <14 ng/L    Troponin T Numeric Delta         Ordered the above labs and independently reviewed and interpreted the results.        RADIOLOGY  XR Chest 1 View    Result Date: 2/14/2025  XR CHEST 1 VW-  HISTORY: Female who is 69 years-old, short of breath  TECHNIQUE: Frontal view of the chest  COMPARISON: 1/4/2018  FINDINGS: The heart size is borderline. Aorta is calcified. Pulmonary vasculature is unremarkable. Old granulomatous disease is apparent. No focal pulmonary consolidation, pleural effusion, or pneumothorax. No acute osseous process.      No evidence for acute pulmonary process. Follow-up as clinical indications persist.  This report was finalized on 2/14/2025 2:49 PM by Dr. Puneet Og M.D on Workstation: Blackboard       I ordered the above noted radiological studies. Independently reviewed and interpreted by me.  See dictation for official radiology interpretation.      PROCEDURES    Procedures      MEDICATIONS GIVEN IN ER    Medications   sodium chloride 0.9 % flush 10 mL (has no administration in time range)   ipratropium-albuterol (DUO-NEB) nebulizer solution 3 mL (3 mL Nebulization Given 2/14/25 1649)         PROGRESS, DATA ANALYSIS, CONSULTS, AND MEDICAL DECISION MAKING    All labs have been independently reviewed and interpreted by me.  All radiology studies have been independently reviewed and interpreted by me and discussed with radiologist dictating the report.   EKG's independently reviewed and interpreted by me.  Discussion below represents my analysis of pertinent findings related to patient's condition, differential diagnosis, treatment plan and final disposition.    My differential diagnosis for dyspnea includes but is not limited to:    Asthma, COPD, pneumonia, pulmonary embolism, acute respiratory distress syndrome, pneumothorax,  hemothorax, pleural effusion, pulmonary fibrosis, congestive heart failure, myocardial infarction, DKA, uremia, acidosis, sepsis, anemia, drug related, hyperventilation, CNS disease, inhalation exposure, airway obstruction, aspiration, electrolyte abnormalities, myasthenia gravis, panic attack, anaphylaxis      ED Course as of 02/14/25 1803 Fri Feb 14, 2025   1640 WBC: 8.68 [DC]   1640 Hemoglobin: 14.9 [DC]   1640 proBNP: 70.3 [DC]   1640 Glucose: 92 [DC]   1640 Creatinine: 0.78 [DC]   1640 Sodium: 141 [DC]   1640 Potassium: 3.7 [DC]   1640 HS Troponin T: <6 [DC]   1640 HS Troponin T: 7 [DC]   1641 XR Chest 1 View  Radiology study independently interpreted by me and my findings are no dense consolidation.   [DC]   1740 Discussed lab and imaging results with the patient.  We will obtain a walking pulse ox and if normal will plan for discharge.  She feels breathing treatment may have helped slightly.  She does have outpatient testing scheduled for stress echo and CT chest which are appropriate but patient does not require emergent admission to the hospital at this time.  She will begin doing breathing treatments more consistently at home and will restart taking her daily antihistamine and nasal spray as well.  She was also previously prescribed Chantix and recommended to decrease and discontinue smoking as this is likely contributing to her shortness of breath.  Discussed with patient strict ER return precautions should any of her symptoms worsen or she develop new symptoms. [DC]   1801 Pt ambulated without O2 desaturation. [DC]      ED Course User Index  [DC] Angy Machuca PA             AS OF 18:03 EST VITALS:    BP - 139/92  HR - 81  TEMP - 97.5 °F (36.4 °C) (Tympanic)  O2 SATS - 97%        DIAGNOSIS  Final diagnoses:   Dyspnea, unspecified type   Tobacco dependence due to cigarettes         DISPOSITION  ED Disposition       ED Disposition   Discharge    Condition   Stable    Comment   --                  Note  Disclaimer: At [facility], we believe that sharing information builds trust and better relationships. You are receiving this note because you recently visited [facility]. It is possible you will see health information before a provider has talked with you about it. This kind of information can be easy to misunderstand. To help you fully understand what it means for your health, we urge you to discuss this note with your provider.         Angy Machuca PA  02/14/25 [id number]

## 2025-03-14 ENCOUNTER — HOSPITAL ENCOUNTER (OUTPATIENT)
Dept: CARDIOLOGY | Facility: HOSPITAL | Age: 70
Discharge: HOME | End: 2025-03-14
Payer: COMMERCIAL

## 2025-03-14 ENCOUNTER — HOSPITAL ENCOUNTER (OUTPATIENT)
Dept: CT IMAGING | Facility: HOSPITAL | Age: 70
Discharge: HOME | End: 2025-03-14
Payer: COMMERCIAL

## 2025-03-14 VITALS
HEART RATE: 76 BPM | BODY MASS INDEX: 24.73 KG/M2 | WEIGHT: 163.14 LBS | SYSTOLIC BLOOD PRESSURE: 134 MMHG | DIASTOLIC BLOOD PRESSURE: 90 MMHG | HEIGHT: 68 IN

## 2025-03-14 DIAGNOSIS — F17.210 CIGARETTE SMOKER: ICD-10-CM

## 2025-03-14 DIAGNOSIS — R07.9 CHEST PAIN, UNSPECIFIED TYPE: ICD-10-CM

## 2025-03-14 LAB
AORTIC DIMENSIONLESS INDEX: 0.62 (DI)
ASCENDING AORTA: 3.1 CM
AV MEAN PRESS GRAD SYS DOP V1V2: 5.6 MMHG
AV VMAX SYS DOP: 179.7 CM/SEC
BH CV ECHO MEAS - ACS: 1.71 CM
BH CV ECHO MEAS - AO MAX PG: 12.9 MMHG
BH CV ECHO MEAS - AO ROOT DIAM: 3.5 CM
BH CV ECHO MEAS - AO V2 VTI: 34.5 CM
BH CV ECHO MEAS - AVA(I,D): 2.01 CM2
BH CV ECHO MEAS - CONTRAST EF (2CH): 67 CM2
BH CV ECHO MEAS - CONTRAST EF 4CH: 67 CM2
BH CV ECHO MEAS - EDV(CUBED): 83.2 ML
BH CV ECHO MEAS - EDV(MOD-SP2): 123 ML
BH CV ECHO MEAS - EDV(MOD-SP4): 142 ML
BH CV ECHO MEAS - ESV(CUBED): 30.6 ML
BH CV ECHO MEAS - ESV(MOD-SP2): 25 ML
BH CV ECHO MEAS - ESV(MOD-SP4): 37 ML
BH CV ECHO MEAS - FS: 28.4 %
BH CV ECHO MEAS - IVS/LVPW: 0.87 CM
BH CV ECHO MEAS - IVSD: 0.86 CM
BH CV ECHO MEAS - LAT PEAK E' VEL: 7.5 CM/SEC
BH CV ECHO MEAS - LV DIASTOLIC VOL/BSA (35-75): 76 CM2
BH CV ECHO MEAS - LV MASS(C)D: 131.9 GRAMS
BH CV ECHO MEAS - LV MAX PG: 5.8 MMHG
BH CV ECHO MEAS - LV MEAN PG: 2.33 MMHG
BH CV ECHO MEAS - LV SYSTOLIC VOL/BSA (12-30): 19.8 CM2
BH CV ECHO MEAS - LV V1 MAX: 120 CM/SEC
BH CV ECHO MEAS - LV V1 VTI: 21.3 CM
BH CV ECHO MEAS - LVIDD: 4.4 CM
BH CV ECHO MEAS - LVIDS: 3.1 CM
BH CV ECHO MEAS - LVOT AREA: 3.2 CM2
BH CV ECHO MEAS - LVOT DIAM: 2.03 CM
BH CV ECHO MEAS - LVPWD: 0.99 CM
BH CV ECHO MEAS - MED PEAK E' VEL: 6.3 CM/SEC
BH CV ECHO MEAS - MR MAX PG: 9.3 MMHG
BH CV ECHO MEAS - MR MAX VEL: 152.6 CM/SEC
BH CV ECHO MEAS - MV A DUR: 0.18 SEC
BH CV ECHO MEAS - MV A MAX VEL: 75.5 CM/SEC
BH CV ECHO MEAS - MV DEC SLOPE: 242 CM/SEC2
BH CV ECHO MEAS - MV DEC TIME: 0.31 SEC
BH CV ECHO MEAS - MV E MAX VEL: 47.2 CM/SEC
BH CV ECHO MEAS - MV E/A: 0.63
BH CV ECHO MEAS - MV MAX PG: 4.8 MMHG
BH CV ECHO MEAS - MV MEAN PG: 1.33 MMHG
BH CV ECHO MEAS - MV P1/2T: 88 MSEC
BH CV ECHO MEAS - MV V2 VTI: 26 CM
BH CV ECHO MEAS - MVA(P1/2T): 2.5 CM2
BH CV ECHO MEAS - MVA(VTI): 2.7 CM2
BH CV ECHO MEAS - PA ACC TIME: 0.15 SEC
BH CV ECHO MEAS - PA V2 MAX: 85.6 CM/SEC
BH CV ECHO MEAS - PULM A REVS DUR: 0.14 SEC
BH CV ECHO MEAS - PULM A REVS VEL: 35.6 CM/SEC
BH CV ECHO MEAS - PULM DIAS VEL: 36.2 CM/SEC
BH CV ECHO MEAS - PULM S/D: 0.7
BH CV ECHO MEAS - PULM SYS VEL: 25.2 CM/SEC
BH CV ECHO MEAS - QP/QS: 1.25
BH CV ECHO MEAS - RAP SYSTOLE: 3 MMHG
BH CV ECHO MEAS - RV MAX PG: 2.8 MMHG
BH CV ECHO MEAS - RV V1 MAX: 84.4 CM/SEC
BH CV ECHO MEAS - RV V1 VTI: 19.2 CM
BH CV ECHO MEAS - RVOT DIAM: 2.4 CM
BH CV ECHO MEAS - RVSP: 23 MMHG
BH CV ECHO MEAS - SV(LVOT): 69.2 ML
BH CV ECHO MEAS - SV(MOD-SP2): 98 ML
BH CV ECHO MEAS - SV(MOD-SP4): 105 ML
BH CV ECHO MEAS - SV(RVOT): 86.8 ML
BH CV ECHO MEAS - SVI(LVOT): 37.1 ML/M2
BH CV ECHO MEAS - SVI(MOD-SP2): 52.4 ML/M2
BH CV ECHO MEAS - SVI(MOD-SP4): 56.2 ML/M2
BH CV ECHO MEAS - TR MAX PG: 20.3 MMHG
BH CV ECHO MEAS - TR MAX VEL: 225.3 CM/SEC
BH CV ECHO MEASUREMENTS AVERAGE E/E' RATIO: 6.84
BH CV STRESS BP STAGE 1: NORMAL
BH CV STRESS BP STAGE 2: NORMAL
BH CV STRESS DURATION MIN STAGE 1: 3
BH CV STRESS DURATION MIN STAGE 2: 3
BH CV STRESS DURATION SEC STAGE 1: 0
BH CV STRESS DURATION SEC STAGE 2: 0
BH CV STRESS ECHO POST STRESS EJECTION FRACTION EF: 77 %
BH CV STRESS GRADE STAGE 1: 10
BH CV STRESS GRADE STAGE 2: 12
BH CV STRESS HR STAGE 1: 104
BH CV STRESS HR STAGE 2: 118
BH CV STRESS METS STAGE 1: 5
BH CV STRESS METS STAGE 2: 7.5
BH CV STRESS PROTOCOL 1: NORMAL
BH CV STRESS RECOVERY BP: NORMAL MMHG
BH CV STRESS RECOVERY HR: 77 BPM
BH CV STRESS SPEED STAGE 1: 1.7
BH CV STRESS SPEED STAGE 2: 2.5
BH CV STRESS STAGE 1: 1
BH CV STRESS STAGE 2: 2
BH CV XLRA - RV BASE: 2.7 CM
BH CV XLRA - RV LENGTH: 7.5 CM
BH CV XLRA - TDI S': 14.2 CM/SEC
LEFT ATRIUM VOLUME INDEX: 26.1 ML/M2
LV EF BIPLANE MOD: 67 %
MAXIMAL PREDICTED HEART RATE: 153 BPM
PERCENT MAX PREDICTED HR: 77.12 %
SINUS: 3.4 CM
STJ: 3 CM
STRESS BASELINE BP: NORMAL MMHG
STRESS BASELINE HR: 71 BPM
STRESS PERCENT HR: 91 %
STRESS POST ESTIMATED WORKLOAD: 7.1 METS
STRESS POST EXERCISE DUR MIN: 6 MIN
STRESS POST EXERCISE DUR SEC: 0 SEC
STRESS POST PEAK BP: NORMAL MMHG
STRESS POST PEAK HR: 118 BPM
STRESS TARGET HR: 130 BPM

## 2025-03-14 PROCEDURE — 93017 CV STRESS TEST TRACING ONLY: CPT

## 2025-03-14 PROCEDURE — 93325 DOPPLER ECHO COLOR FLOW MAPG: CPT

## 2025-03-14 PROCEDURE — 71271 CT THORAX LUNG CANCER SCR C-: CPT

## 2025-03-14 PROCEDURE — 93320 DOPPLER ECHO COMPLETE: CPT

## 2025-03-14 PROCEDURE — 25510000001 PERFLUTREN 6.52 MG/ML SUSPENSION 2 ML VIAL: Performed by: STUDENT IN AN ORGANIZED HEALTH CARE EDUCATION/TRAINING PROGRAM

## 2025-03-14 PROCEDURE — 93350 STRESS TTE ONLY: CPT

## 2025-03-14 RX ADMIN — PERFLUTREN 3 ML: 6.52 INJECTION, SUSPENSION INTRAVENOUS at 11:32

## 2025-04-06 DIAGNOSIS — I10 PRIMARY HYPERTENSION: ICD-10-CM

## 2025-04-07 RX ORDER — AMLODIPINE BESYLATE 10 MG/1
10 TABLET ORAL DAILY
Qty: 90 TABLET | Refills: 0 | Status: SHIPPED | OUTPATIENT
Start: 2025-04-07

## 2025-04-07 RX ORDER — LOSARTAN POTASSIUM 100 MG/1
100 TABLET ORAL DAILY
Qty: 90 TABLET | Refills: 0 | Status: SHIPPED | OUTPATIENT
Start: 2025-04-07

## 2025-04-11 RX ORDER — VALACYCLOVIR HYDROCHLORIDE 1 G/1
TABLET, FILM COATED ORAL
Qty: 90 TABLET | Refills: 1 | Status: SHIPPED | OUTPATIENT
Start: 2025-04-11

## 2025-05-21 ENCOUNTER — OFFICE VISIT (OUTPATIENT)
Dept: OBSTETRICS AND GYNECOLOGY | Age: 70
End: 2025-05-21
Payer: COMMERCIAL

## 2025-05-21 VITALS
WEIGHT: 173 LBS | HEIGHT: 68 IN | BODY MASS INDEX: 26.22 KG/M2 | SYSTOLIC BLOOD PRESSURE: 132 MMHG | DIASTOLIC BLOOD PRESSURE: 88 MMHG

## 2025-05-21 DIAGNOSIS — Z01.419 ENCOUNTER FOR GYNECOLOGICAL EXAMINATION: Primary | ICD-10-CM

## 2025-05-21 DIAGNOSIS — Z12.31 SCREENING MAMMOGRAM FOR BREAST CANCER: ICD-10-CM

## 2025-05-21 DIAGNOSIS — Z86.19 HX OF HERPES GENITALIS: ICD-10-CM

## 2025-05-21 DIAGNOSIS — Z78.0 POSTMENOPAUSE: ICD-10-CM

## 2025-05-21 RX ORDER — FLUTICASONE PROPIONATE AND SALMETEROL 250; 50 UG/1; UG/1
POWDER RESPIRATORY (INHALATION)
COMMUNITY
Start: 2025-03-03

## 2025-05-21 RX ORDER — ALBUTEROL SULFATE 90 UG/1
2 INHALANT RESPIRATORY (INHALATION)
COMMUNITY
Start: 2024-11-30

## 2025-05-21 RX ORDER — AMLODIPINE AND BENAZEPRIL HYDROCHLORIDE 10; 20 MG/1; MG/1
1 CAPSULE ORAL DAILY
COMMUNITY

## 2025-05-21 RX ORDER — VALACYCLOVIR HYDROCHLORIDE 1 G/1
TABLET, FILM COATED ORAL
Qty: 90 TABLET | Refills: 3 | Status: SHIPPED | OUTPATIENT
Start: 2025-05-21

## 2025-05-21 NOTE — PROGRESS NOTES
Subjective       History of Present Illness    Chief Complaint   Patient presents with    Gynecologic Exam     New gyn last pap (-) hpv (-) 21 mg (-) 02 dexa 10/28/13 cc: no complaints today        Adele Ly is a 69 y.o. female who presents for annual exam.  Previous patient of Dr Taylor  Hasn't been in for over 3 years  She is postmenopausal, no vb  Personal hx of breast cancer, +estrogen receptor- right mastectomy  She is a smoker  Hasn't had insurance for quite some time so she is trying to catch up on all of her doctor visits  She is overdue for her mammogram and DEXA  Takes Valtrex daily for HSV suppression, needs refills for that.  Takes 1gm per day.  If she takes less or misses any she will start to have outbreak symptoms  Denies any bowel or bladder problems          OB History    Para Term  AB Living   0 0 0 0 0 0   SAB IAB Ectopic Molar Multiple Live Births   0 0 0 0 0 0   Obstetric Comments   Took birth control 6 years. Took hormone replacement therapy for 10 years.        The following portions of the patient's history were reviewed and updated as appropriate: allergies, current medications, past family history, past medical history, past social history, past surgical history and problem list.    Current contraception: post menopausal status  History of abnormal Pap smear: no  Family history of Breast cancer: PH breast cancer  Family history of uterine or ovarian cancer: no  Family History of colon cancer/colon polyps: no  History of abnormal mammogram: yes - PH breast ca    Mammogram: ordered.  DEXA: ordered.  Last Pap:    Social History    Tobacco Use      Smoking status: Every Day        Packs/day: 1.00        Years: 1 pack/day for 50.4 years (50.4 ttl pk-yrs)        Types: Cigarettes        Start date:       Smokeless tobacco: Current    Past Medical History:   Diagnosis Date    Arthritis     Breast cancer     Right, Stage IA    Colon polyp     Emphysema of lung      Genital herpes     History of diverticulitis     History of gastric ulcer     Hypertension     Infectious viral hepatitis     B      Past Surgical History:   Procedure Laterality Date    BREAST BIOPSY Right 2017    malignant    COLON RESECTION      COLOSTOMY      COLOSTOMY REVISION      MASTECTOMY WITH SENTINEL NODE BIOPSY AND AXILLARY NODE DISSECTION Right 1/10/2018    Procedure: BREAST MASTECTOMY WITH SENTINEL NODE BIOPSY;  Surgeon: Juan Peterson MD;  Location: [facility];  Service:     PELVIC LAPAROSCOPY      TONSILLECTOMY      WISDOM TOOTH EXTRACTION          The following portions of the patient's history were reviewed and updated as appropriate: allergies, current medications, past family history, past medical history, past social history, past surgical history, and problem list.    Review of Systems   Constitutional: Negative.    HENT: Negative.     Eyes: Negative.    Respiratory: Negative.     Cardiovascular: Negative.    Gastrointestinal: Negative.    Endocrine: Negative.    Genitourinary: Negative.    Musculoskeletal: Negative.    Skin: Negative.    Allergic/Immunologic: Negative.    Neurological: Negative.    Hematological: Negative.    Psychiatric/Behavioral: Negative.           Objective   Physical Exam  Vitals reviewed.   Constitutional:       Appearance: She is well-developed.   Neck:      Thyroid: No thyroid mass.   Cardiovascular:      Rate and Rhythm: Normal rate and regular rhythm.      Heart sounds: Normal heart sounds.   Pulmonary:      Effort: Pulmonary effort is normal.      Breath sounds: Normal breath sounds.   Chest:   Breasts:     Left: No mass, nipple discharge, skin change or tenderness.      Comments: Right scar, mastectomy  Abdominal:      Palpations: Abdomen is soft.      Tenderness: There is no abdominal tenderness.   Genitourinary:     Labia:         Right: No rash or lesion.         Left: No rash or lesion.       Vagina: Normal.      Cervix: No cervical motion  "tenderness, discharge or friability.      Adnexa:         Right: No mass or tenderness.          Left: No mass or tenderness.     Neurological:      Mental Status: She is alert and oriented to person, place, and time.   Psychiatric:         Behavior: Behavior normal.         /88   Ht 172 cm (67.72\")   Wt 78.5 kg (173 lb)   BMI 26.52 kg/m²     Assessment & Plan   Diagnoses and all orders for this visit:    1. Encounter for gynecological examination (Primary)  -     IGP, Apt HPV,rfx 16 / 18,45    2. Postmenopause  -     DEXA Bone Density Axial    3. Screening mammogram for breast cancer  -     Cancel: Mammo Screening Digital Tomosynthesis Bilateral With CAD  -     Mammo screening modified with tomosynthesis left w CAD    4. Hx of herpes genitalis    Other orders  -     valACYclovir (Valtrex) 1000 MG tablet; Take 1 tab po daily  Dispense: 90 tablet; Refill: 3          Breast self exam technique reviewed and patient encouraged to perform self-exam monthly.  Discussed healthy lifestyle modifications.  Pap smear done with HPV  Recommended 30 minutes of aerobic exercise five times per week.  Discussed calcium needs to prevent osteoporosis  Discussed HSV suppression- valtrex sent to pharmacy         "

## 2025-05-27 LAB
CYTOLOGIST CVX/VAG CYTO: NORMAL
CYTOLOGY CVX/VAG DOC CYTO: NORMAL
CYTOLOGY CVX/VAG DOC THIN PREP: NORMAL
DX ICD CODE: NORMAL
HPV I/H RISK 4 DNA CVX QL PROBE+SIG AMP: NEGATIVE
OTHER STN SPEC: NORMAL
SERVICE CMNT-IMP: NORMAL
STAT OF ADQ CVX/VAG CYTO-IMP: NORMAL

## 2025-05-29 DIAGNOSIS — K21.9 CHRONIC GERD: ICD-10-CM

## 2025-05-29 RX ORDER — OMEPRAZOLE 20 MG/1
20 CAPSULE, DELAYED RELEASE ORAL DAILY
Qty: 90 CAPSULE | Refills: 0 | Status: SHIPPED | OUTPATIENT
Start: 2025-05-29

## 2025-06-24 ENCOUNTER — TELEPHONE (OUTPATIENT)
Dept: OBSTETRICS AND GYNECOLOGY | Age: 70
End: 2025-06-24
Payer: MEDICARE

## 2025-06-24 NOTE — TELEPHONE ENCOUNTER
Call pt she states she will give us a call to schedule pt has alot of appt and going to wait to schedule mg and dexa

## 2025-07-04 DIAGNOSIS — I10 PRIMARY HYPERTENSION: ICD-10-CM

## 2025-07-07 RX ORDER — AMLODIPINE BESYLATE 10 MG/1
10 TABLET ORAL DAILY
Qty: 90 TABLET | Refills: 1 | Status: SHIPPED | OUTPATIENT
Start: 2025-07-07

## 2025-07-09 ENCOUNTER — OFFICE VISIT (OUTPATIENT)
Dept: FAMILY MEDICINE CLINIC | Facility: CLINIC | Age: 70
End: 2025-07-09
Payer: MEDICARE

## 2025-07-09 VITALS
OXYGEN SATURATION: 98 % | RESPIRATION RATE: 16 BRPM | HEIGHT: 68 IN | HEART RATE: 74 BPM | SYSTOLIC BLOOD PRESSURE: 116 MMHG | DIASTOLIC BLOOD PRESSURE: 74 MMHG | BODY MASS INDEX: 25.78 KG/M2 | WEIGHT: 170.1 LBS

## 2025-07-09 DIAGNOSIS — K21.9 CHRONIC GERD: ICD-10-CM

## 2025-07-09 DIAGNOSIS — I10 PRIMARY HYPERTENSION: ICD-10-CM

## 2025-07-09 DIAGNOSIS — J30.9 ALLERGIC RHINITIS, UNSPECIFIED SEASONALITY, UNSPECIFIED TRIGGER: Primary | ICD-10-CM

## 2025-07-09 DIAGNOSIS — I25.10 ATHEROSCLEROSIS OF NATIVE CORONARY ARTERY OF NATIVE HEART WITHOUT ANGINA PECTORIS: ICD-10-CM

## 2025-07-15 ENCOUNTER — TELEPHONE (OUTPATIENT)
Dept: FAMILY MEDICINE CLINIC | Facility: CLINIC | Age: 70
End: 2025-07-15

## 2025-07-15 DIAGNOSIS — I10 PRIMARY HYPERTENSION: ICD-10-CM

## 2025-07-15 DIAGNOSIS — K21.9 CHRONIC GERD: ICD-10-CM

## 2025-07-15 PROBLEM — J30.9 ALLERGIC RHINITIS: Status: ACTIVE | Noted: 2025-07-15

## 2025-07-15 PROBLEM — I25.10 ATHEROSCLEROTIC HEART DISEASE OF NATIVE CORONARY ARTERY WITHOUT ANGINA PECTORIS: Status: ACTIVE | Noted: 2025-07-15

## 2025-07-15 RX ORDER — OMEPRAZOLE 20 MG/1
20 CAPSULE, DELAYED RELEASE ORAL DAILY
Qty: 90 CAPSULE | Refills: 0 | Status: SHIPPED | OUTPATIENT
Start: 2025-07-15 | End: 2025-07-15

## 2025-07-15 RX ORDER — OMEPRAZOLE 20 MG/1
20 CAPSULE, DELAYED RELEASE ORAL DAILY
Qty: 90 CAPSULE | Refills: 0 | Status: SHIPPED | OUTPATIENT
Start: 2025-07-15

## 2025-07-15 RX ORDER — MELOXICAM 15 MG/1
15 TABLET ORAL DAILY
Qty: 30 TABLET | Refills: 1 | Status: SHIPPED | OUTPATIENT
Start: 2025-07-15

## 2025-07-15 RX ORDER — AMLODIPINE BESYLATE 10 MG/1
10 TABLET ORAL DAILY
Qty: 90 TABLET | Refills: 0 | Status: SHIPPED | OUTPATIENT
Start: 2025-07-15 | End: 2025-07-15

## 2025-07-15 RX ORDER — AMLODIPINE BESYLATE 10 MG/1
10 TABLET ORAL DAILY
Qty: 90 TABLET | Refills: 1 | Status: SHIPPED | OUTPATIENT
Start: 2025-07-15

## 2025-07-15 RX ORDER — MELOXICAM 15 MG/1
15 TABLET ORAL DAILY
Qty: 30 TABLET | Refills: 1 | Status: SHIPPED | OUTPATIENT
Start: 2025-07-15 | End: 2025-07-15

## 2025-07-15 RX ORDER — LOSARTAN POTASSIUM 100 MG/1
100 TABLET ORAL DAILY
Qty: 90 TABLET | Refills: 0 | Status: SHIPPED | OUTPATIENT
Start: 2025-07-15

## 2025-07-15 RX ORDER — ROSUVASTATIN CALCIUM 10 MG/1
10 TABLET, COATED ORAL DAILY
Qty: 90 TABLET | Refills: 3 | Status: SHIPPED | OUTPATIENT
Start: 2025-07-15

## 2025-07-15 RX ORDER — LOSARTAN POTASSIUM 100 MG/1
100 TABLET ORAL DAILY
Qty: 90 TABLET | Refills: 0 | Status: SHIPPED | OUTPATIENT
Start: 2025-07-15 | End: 2025-07-15

## 2025-07-15 NOTE — PROGRESS NOTES
Subjective   Adele Ly is a 69 y.o. female. Patient is here today for   Chief Complaint   Patient presents with    Med Refill        History of Present Illness  Here today for medication refills.    History of Present Illness  The patient presents for FMLA paperwork, arthritis, allergies, coronary artery disease, and medication management.    She has been employed at her current job since 09/17/2024 and plans to apply for FMLA on 09/17/2025. 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 She sought immediate care twice in 11/2024 and visited the ER at [facility] in 12/2024, where she underwent lab tests. She is not ready to retire due to financial constraints. She has severe arthritis in her left hand, which is more pronounced than in her right hand or wrist. She experiences severe pain in her neck and back due to arthritis. Her job involves sitting and reading rubber keys, which causes significant hand pain. She struggles to stand for extended periods. She is seeking a refill of meloxicam for her arthritis. She has been taking Tylenol 8-hour arthritis medication at a dose of 650 mg per pill but experiences dizziness after taking two pills. Reducing the dose to one pill alleviates the dizziness but does not provide sufficient relief. Meloxicam does not cause dizziness and provides relief. She visits a chiropractor every 1 to 2 weeks for neck pain.    She uses a rescue inhaler daily, prescribed by her lung specialist, Dr. Montez Noble, whom she sees once or twice a year. She was diagnosed with severe allergies and was advised to take Zyrtec daily, use her rescue inhaler daily, and use her nebulizer as needed. She continues to smoke, although less frequently, and has Chantix available if she decides to quit. She had a CT scan and lab work done in 12/2024 at [facility]. She was informed that her blood work was normal. She no longer  takes Advair due to its cost. She gets her rescue inhaler free from the [employer]. She was previously told she was borderline high cholesterol and was given dietary advice.    She has a family history of heart disease and is not currently experiencing chest pain.    She is requesting refills for omeprazole, amlodipine, and losartan.    She recently visited the dentist for the first time in three years and has two follow-up appointments scheduled. She also sees a gynecologist.    SOCIAL HISTORY  The patient admits to smoking but not as much as before.    FAMILY HISTORY  The patient's mother has congestive heart failure and two broken fractures in her lower back.      Vitals:    07/09/25 1020   BP: 116/74   Pulse: 74   Resp: 16   SpO2: 98%     Body mass index is 26.08 kg/m².    Past Medical History:   Diagnosis Date    Arthritis     Breast cancer     Right, Stage IA    Colon polyp     Emphysema of lung     Genital herpes     History of diverticulitis     History of gastric ulcer     Hypertension     Infectious viral hepatitis     B      Allergies   Allergen Reactions    Penicillins Swelling     THROAT SWELLS  Other reaction(s): anaphylaxis  THROAT SWELLS  THROAT SWELLS    Latex Rash      Social History     Socioeconomic History    Marital status:     Number of children: 0   Tobacco Use    Smoking status: Every Day     Current packs/day: 1.00     Average packs/day: 1 pack/day for 50.5 years (50.5 ttl pk-yrs)     Types: Cigarettes     Start date: 1975    Smokeless tobacco: Current   Vaping Use    Vaping status: Never Used   Substance and Sexual Activity    Alcohol use: Yes     Comment: COUPLE DAYS A WEEK , BEER    Drug use: No    Sexual activity: Not Currently     Birth control/protection: Post-menopausal        Current Outpatient Medications:     albuterol sulfate  (90 Base) MCG/ACT inhaler, Inhale 2 puffs., Disp: , Rfl:     amLODIPine (NORVASC) 10 MG tablet, Take 1 tablet by mouth Daily.,  Disp: 90 tablet, Rfl: 1    Apremilast (OTEZLA PO), Take 1 tablet/day by mouth 2 (Two) Times a Day., Disp: , Rfl:     cetirizine (zyrTEC) 10 MG tablet, Take 1 tablet by mouth Daily., Disp: 30 tablet, Rfl: 0    fluticasone (FLONASE) 50 MCG/ACT nasal spray, Administer 2 sprays into the nostril(s) as directed by provider Daily., Disp: 16 g, Rfl: 0    losartan (COZAAR) 100 MG tablet, Take 1 tablet by mouth Daily., Disp: 90 tablet, Rfl: 0    meloxicam (MOBIC) 15 MG tablet, Take 1 tablet by mouth Daily., Disp: 30 tablet, Rfl: 1    omeprazole (priLOSEC) 20 MG capsule, Take 1 capsule by mouth Daily., Disp: 90 capsule, Rfl: 0    Spiriva Respimat 2.5 MCG/ACT aerosol solution inhaler, Inhale 2 puffs Daily., Disp: 4 g, Rfl: 11    valACYclovir (Valtrex) 1000 MG tablet, Take 1 tab po daily, Disp: 90 tablet, Rfl: 3    rosuvastatin (Crestor) 10 MG tablet, Take 1 tablet by mouth Daily., Disp: 90 tablet, Rfl: 3     Objective     Review of Systems   Constitutional: Negative.    HENT: Negative.     Respiratory: Negative.     Cardiovascular: Negative.    Musculoskeletal: Negative.    Psychiatric/Behavioral: Negative.         Physical Exam  Vitals and nursing note reviewed.   Constitutional:       Appearance: Normal appearance.      Comments: Pleasant, neatly groomed, no distress.  BMI 26.   Neck:      Vascular: No carotid bruit.   Cardiovascular:      Rate and Rhythm: Regular rhythm.      Heart sounds: Normal heart sounds. No murmur heard.     No gallop.   Pulmonary:      Effort: No respiratory distress.      Breath sounds: Normal breath sounds. No wheezing or rales.   Neurological:      Mental Status: She is alert and oriented to person, place, and time.   Psychiatric:         Mood and Affect: Mood normal.         Behavior: Behavior normal.         Thought Content: Thought content normal.       Physical Exam      Results  Labs   - LDL Cholesterol: LDL cholesterol was too high    Imaging   - CT scan: 03/2025, Probably benign pulmonary  nodules and severe calcified coronary artery disease    Assessment & Plan    Problems Addressed this Visit          Allergies and Adverse Reactions    Allergic rhinitis - Primary    Relevant Medications    meloxicam (MOBIC) 15 MG tablet       Cardiac and Vasculature    Hypertension    Relevant Medications    losartan (COZAAR) 100 MG tablet    amLODIPine (NORVASC) 10 MG tablet    Atherosclerotic heart disease of native coronary artery without angina pectoris    Relevant Medications    amLODIPine (NORVASC) 10 MG tablet     Other Visit Diagnoses         Chronic GERD        Relevant Medications    omeprazole (priLOSEC) 20 MG capsule          Diagnoses         Codes Comments      Allergic rhinitis, unspecified seasonality, unspecified trigger    -  Primary ICD-10-CM: J30.9  ICD-9-CM: 477.9       Chronic GERD     ICD-10-CM: K21.9  ICD-9-CM: 530.81       Primary hypertension     ICD-10-CM: I10  ICD-9-CM: 401.9       Atherosclerosis of native coronary artery of native heart without angina pectoris     ICD-10-CM: I25.10  ICD-9-CM: 414.01           Assessment & Plan  1. Arthritis.  - Reports severe arthritis in neck, back, hands, and wrists; visits chiropractor weekly or biweekly for neck pain.  - Meloxicam has been effective for arthritis without causing dizziness.  - Advised not to take other anti-inflammatory medications while on meloxicam; Tylenol is safe but less effective.  - Prescription for meloxicam will be provided.    2. Allergies.  - Advised to take Zyrtec daily and use rescue inhaler daily; nebulizer use when necessary.  - Continues to smoke but has been given Chantix by lung specialist to help quit smoking.  - Severe allergies managed with daily medication and inhaler.  - Encouraged to take control of allergies and try harder with management.    3. Coronary artery disease.  - CT scan from 03/2025 revealed severe calcified coronary artery disease.  - Advised to maintain healthy blood pressure and cholesterol  levels to slow disease progression.  - Prescription for cholesterol medication will be provided.  - No current chest pains reported; focus on preventive measures.    4. Medication management.  - Prescriptions for omeprazole, amlodipine, and losartan will be refilled.  - Recently refilled losartan and amlodipine; no immediate refill needed for those.  - Meloxicam prescription will be provided for arthritis.  - Follow-up in 3 months to assess cholesterol and overall health.    5.  If she needs FMLA papers signed by me have asked her to follow-up for another appointment for she and I to discuss the issues and the questions that come up while reviewing this paperwork.    I know that she does help look after her mother who depends on this patient to provide some important elements of her care.    For the time being the patient tells me that she gets about 7 days of paid time off a year and the way her company manages this is that she must use 1 paid time off day for any day that she may miss for any reason  No follow-ups on file.    Patient or patient representative verbalized consent for the use of Ambient Listening during the visit with  Hunter Cosby MD for chart documentation. 7/15/2025  14:32 EDT

## 2025-07-15 NOTE — TELEPHONE ENCOUNTER
Caller: Adele Ly    Relationship: Self    Best call back number: [phone]     Requested Prescriptions:   Requested Prescriptions     Pending Prescriptions Disp Refills    amLODIPine (NORVASC) 10 MG tablet 90 tablet 1     Sig: Take 1 tablet by mouth Daily.    losartan (COZAAR) 100 MG tablet 90 tablet 0     Sig: Take 1 tablet by mouth Daily.    meloxicam (MOBIC) 15 MG tablet 30 tablet 1     Sig: Take 1 tablet by mouth Daily.    omeprazole (priLOSEC) 20 MG capsule 90 capsule 0     Sig: Take 1 capsule by mouth Daily.        Pharmacy where request should be sent: [facility] [id number] [facility] [address] [place] [address] AT [facility] [phone] [facility] [phone] FX     Last office visit with prescribing clinician: 7/9/2025   Last telemedicine visit with prescribing clinician: Visit date not found   Next office visit with prescribing clinician: 10/15/2025     Would you like a call back once the refill request has been completed: [] Yes [x] No    If the office needs to give you a call back, can they leave a voicemail: [] Yes [x] No    Arely Akins   07/15/25 08:10 EDT

## (undated) DEVICE — SYS PERFUS SEP PLATLT W TIPS CUST

## (undated) DEVICE — PENCL E/S HNDSWTCH SMOKEEVAC HOLSTR 10FT

## (undated) DEVICE — TUBING, SUCTION, 1/4" X 20', STRAIGHT: Brand: MEDLINE INDUSTRIES, INC.

## (undated) DEVICE — BNDG ELAS ELITE V/CLOSE 6IN 5YD LF STRL

## (undated) DEVICE — NDL HYPO PRECISIONGLIDE REG 25G 1 1/2

## (undated) DEVICE — ANTIBACTERIAL UNDYED BRAIDED (POLYGLACTIN 910), SYNTHETIC ABSORBABLE SUTURE: Brand: COATED VICRYL

## (undated) DEVICE — SPNG GZ WOVN 4X4IN 12PLY 10/BX STRL

## (undated) DEVICE — PK CHST BRST 40

## (undated) DEVICE — SKIN PREP TRAY W/CHG: Brand: MEDLINE INDUSTRIES, INC.

## (undated) DEVICE — GOWN,SIRUS,NON REINFRCD,LARGE,SET IN SL: Brand: MEDLINE

## (undated) DEVICE — 3M™ STERI-STRIP™ COMPOUND BENZOIN TINCTURE 40 BAGS/CARTON 4 CARTONS/CASE C1544: Brand: 3M™ STERI-STRIP™

## (undated) DEVICE — PAD,ABDOMINAL,8"X10",ST,LF: Brand: MEDLINE

## (undated) DEVICE — GLV SURG BIOGEL LTX PF 6 1/2

## (undated) DEVICE — 1010 S-DRAPE TOWEL DRAPE 10/BX: Brand: STERI-DRAPE™

## (undated) DEVICE — ENCORE® LATEX ORTHO SIZE 8, STERILE LATEX POWDER-FREE SURGICAL GLOVE: Brand: ENCORE

## (undated) DEVICE — SUT SILK 2/0 FS BLK 18IN 685G

## (undated) DEVICE — SYR LUERLOK 5CC

## (undated) DEVICE — CVR TRANSD CIV FLX TPR 11.9 TO 3.8X61CM

## (undated) DEVICE — STPLR SKIN VISISTAT WD 35CT

## (undated) DEVICE — ELECTRD BLD EDGE/INSUL1P 2.4X5.1MM STRL

## (undated) DEVICE — 3M™ STERI-STRIP™ REINFORCED ADHESIVE SKIN CLOSURES, R1547, 1/2 IN X 4 IN (12 MM X 100 MM), 6 STRIPS/ENVELOPE: Brand: 3M™ STERI-STRIP™

## (undated) DEVICE — SUT VIC 3/0 TIES 18IN J110T